# Patient Record
Sex: FEMALE | Race: OTHER | Employment: PART TIME | URBAN - METROPOLITAN AREA
[De-identification: names, ages, dates, MRNs, and addresses within clinical notes are randomized per-mention and may not be internally consistent; named-entity substitution may affect disease eponyms.]

---

## 2017-03-15 ENCOUNTER — HOSPITAL ENCOUNTER (OUTPATIENT)
Dept: LAB | Age: 37
Discharge: HOME | End: 2017-03-15

## 2017-03-15 PROCEDURE — 88142 CYTOPATH C/V THIN LAYER: CPT | Performed by: OBSTETRICS & GYNECOLOGY

## 2017-03-15 PROCEDURE — 87624 HPV HI-RISK TYP POOLED RSLT: CPT | Performed by: OBSTETRICS & GYNECOLOGY

## 2017-03-15 PROCEDURE — 87491 CHLMYD TRACH DNA AMP PROBE: CPT

## 2017-03-21 LAB
C TRACH RRNA SPEC QL NAA+PROBE: NEGATIVE
N GONORRHOEA RRNA SPEC QL NAA+PROBE: NEGATIVE
SPECIMEN SOURCE: NORMAL

## 2017-03-22 LAB
HPV I/H RISK 1 DNA CVX QL PROBE+SIG AMP: NEGATIVE
SPECIMEN SOURCE: NORMAL

## 2017-11-05 ENCOUNTER — APPOINTMENT (OUTPATIENT)
Dept: CT IMAGING | Age: 37
End: 2017-11-05
Attending: EMERGENCY MEDICINE
Payer: SELF-PAY

## 2017-11-05 ENCOUNTER — HOSPITAL ENCOUNTER (EMERGENCY)
Age: 37
Discharge: HOME | End: 2017-11-06
Attending: EMERGENCY MEDICINE
Payer: SELF-PAY

## 2017-11-05 DIAGNOSIS — R50.9 FEVER, UNSPECIFIED FEVER CAUSE: ICD-10-CM

## 2017-11-05 DIAGNOSIS — R10.9 FLANK PAIN: Primary | ICD-10-CM

## 2017-11-05 LAB
ALBUMIN SERPL-MCNC: 3.5 G/DL (ref 3.5–5)
ALBUMIN/GLOB SERPL: 0.9 {RATIO} (ref 1.2–3.5)
ALP SERPL-CCNC: 80 U/L (ref 50–136)
ALT SERPL-CCNC: 66 U/L (ref 12–65)
ANION GAP SERPL CALC-SCNC: 10 MMOL/L (ref 7–16)
AST SERPL-CCNC: 45 U/L (ref 15–37)
BASOPHILS # BLD: 0 K/UL (ref 0–0.2)
BASOPHILS NFR BLD: 0 % (ref 0–2)
BILIRUB SERPL-MCNC: 0.5 MG/DL (ref 0.2–1.1)
BUN SERPL-MCNC: 12 MG/DL (ref 6–23)
CALCIUM SERPL-MCNC: 8.7 MG/DL (ref 8.3–10.4)
CHLORIDE SERPL-SCNC: 101 MMOL/L (ref 98–107)
CO2 SERPL-SCNC: 28 MMOL/L (ref 21–32)
CREAT SERPL-MCNC: 0.67 MG/DL (ref 0.6–1)
DIFFERENTIAL METHOD BLD: ABNORMAL
EOSINOPHIL # BLD: 0 K/UL (ref 0–0.8)
EOSINOPHIL NFR BLD: 0 % (ref 0.5–7.8)
ERYTHROCYTE [DISTWIDTH] IN BLOOD BY AUTOMATED COUNT: 12.5 % (ref 11.9–14.6)
GLOBULIN SER CALC-MCNC: 4.1 G/DL (ref 2.3–3.5)
GLUCOSE SERPL-MCNC: 164 MG/DL (ref 65–100)
HCG UR QL: NEGATIVE
HCT VFR BLD AUTO: 41.1 % (ref 35.8–46.3)
HGB BLD-MCNC: 14.1 G/DL (ref 11.7–15.4)
IMM GRANULOCYTES # BLD: 0 K/UL (ref 0–0.5)
IMM GRANULOCYTES NFR BLD: 0 % (ref 0–5)
LACTATE BLD-SCNC: 2.4 MMOL/L (ref 0.5–1.9)
LIPASE SERPL-CCNC: 152 U/L (ref 73–393)
LYMPHOCYTES # BLD: 0.6 K/UL (ref 0.5–4.6)
LYMPHOCYTES NFR BLD: 8 % (ref 13–44)
MCH RBC QN AUTO: 30.2 PG (ref 26.1–32.9)
MCHC RBC AUTO-ENTMCNC: 34.3 G/DL (ref 31.4–35)
MCV RBC AUTO: 88 FL (ref 79.6–97.8)
MONOCYTES # BLD: 0.2 K/UL (ref 0.1–1.3)
MONOCYTES NFR BLD: 2 % (ref 4–12)
NEUTS SEG # BLD: 6.9 K/UL (ref 1.7–8.2)
NEUTS SEG NFR BLD: 90 % (ref 43–78)
PLATELET # BLD AUTO: 206 K/UL (ref 150–450)
PMV BLD AUTO: 9.3 FL (ref 10.8–14.1)
POTASSIUM SERPL-SCNC: 3.6 MMOL/L (ref 3.5–5.1)
PROT SERPL-MCNC: 7.6 G/DL (ref 6.3–8.2)
RBC # BLD AUTO: 4.67 M/UL (ref 4.05–5.25)
SODIUM SERPL-SCNC: 139 MMOL/L (ref 136–145)
WBC # BLD AUTO: 7.8 K/UL (ref 4.3–11.1)

## 2017-11-05 PROCEDURE — 74011250636 HC RX REV CODE- 250/636: Performed by: EMERGENCY MEDICINE

## 2017-11-05 PROCEDURE — 87205 SMEAR GRAM STAIN: CPT | Performed by: EMERGENCY MEDICINE

## 2017-11-05 PROCEDURE — 74176 CT ABD & PELVIS W/O CONTRAST: CPT

## 2017-11-05 PROCEDURE — 85025 COMPLETE CBC W/AUTO DIFF WBC: CPT | Performed by: EMERGENCY MEDICINE

## 2017-11-05 PROCEDURE — 96374 THER/PROPH/DIAG INJ IV PUSH: CPT | Performed by: EMERGENCY MEDICINE

## 2017-11-05 PROCEDURE — 99284 EMERGENCY DEPT VISIT MOD MDM: CPT | Performed by: EMERGENCY MEDICINE

## 2017-11-05 PROCEDURE — 83605 ASSAY OF LACTIC ACID: CPT

## 2017-11-05 PROCEDURE — 93005 ELECTROCARDIOGRAM TRACING: CPT | Performed by: EMERGENCY MEDICINE

## 2017-11-05 PROCEDURE — 80053 COMPREHEN METABOLIC PANEL: CPT | Performed by: EMERGENCY MEDICINE

## 2017-11-05 PROCEDURE — 81025 URINE PREGNANCY TEST: CPT

## 2017-11-05 PROCEDURE — 96361 HYDRATE IV INFUSION ADD-ON: CPT | Performed by: EMERGENCY MEDICINE

## 2017-11-05 PROCEDURE — 87040 BLOOD CULTURE FOR BACTERIA: CPT | Performed by: EMERGENCY MEDICINE

## 2017-11-05 PROCEDURE — 83690 ASSAY OF LIPASE: CPT | Performed by: EMERGENCY MEDICINE

## 2017-11-05 PROCEDURE — 87077 CULTURE AEROBIC IDENTIFY: CPT | Performed by: EMERGENCY MEDICINE

## 2017-11-05 PROCEDURE — 87186 SC STD MICRODIL/AGAR DIL: CPT | Performed by: EMERGENCY MEDICINE

## 2017-11-05 PROCEDURE — 96375 TX/PRO/DX INJ NEW DRUG ADDON: CPT | Performed by: EMERGENCY MEDICINE

## 2017-11-05 RX ORDER — ONDANSETRON 2 MG/ML
4 INJECTION INTRAMUSCULAR; INTRAVENOUS
Status: COMPLETED | OUTPATIENT
Start: 2017-11-05 | End: 2017-11-05

## 2017-11-05 RX ORDER — MORPHINE SULFATE 8 MG/ML
4 INJECTION, SOLUTION INTRAMUSCULAR; INTRAVENOUS
Status: COMPLETED | OUTPATIENT
Start: 2017-11-05 | End: 2017-11-05

## 2017-11-05 RX ORDER — KETOROLAC TROMETHAMINE 30 MG/ML
30 INJECTION, SOLUTION INTRAMUSCULAR; INTRAVENOUS
Status: COMPLETED | OUTPATIENT
Start: 2017-11-05 | End: 2017-11-06

## 2017-11-05 RX ORDER — SODIUM CHLORIDE 0.9 % (FLUSH) 0.9 %
5-10 SYRINGE (ML) INJECTION EVERY 8 HOURS
Status: DISCONTINUED | OUTPATIENT
Start: 2017-11-05 | End: 2017-11-06 | Stop reason: HOSPADM

## 2017-11-05 RX ORDER — SODIUM CHLORIDE 0.9 % (FLUSH) 0.9 %
5-10 SYRINGE (ML) INJECTION AS NEEDED
Status: DISCONTINUED | OUTPATIENT
Start: 2017-11-05 | End: 2017-11-06 | Stop reason: HOSPADM

## 2017-11-05 RX ADMIN — SODIUM CHLORIDE 1000 ML: 900 INJECTION, SOLUTION INTRAVENOUS at 23:20

## 2017-11-05 RX ADMIN — ONDANSETRON 4 MG: 2 INJECTION INTRAMUSCULAR; INTRAVENOUS at 23:20

## 2017-11-05 RX ADMIN — Medication 4 MG: at 23:20

## 2017-11-06 VITALS
HEIGHT: 67 IN | OXYGEN SATURATION: 94 % | WEIGHT: 215 LBS | DIASTOLIC BLOOD PRESSURE: 51 MMHG | RESPIRATION RATE: 18 BRPM | TEMPERATURE: 98.5 F | BODY MASS INDEX: 33.74 KG/M2 | HEART RATE: 119 BPM | SYSTOLIC BLOOD PRESSURE: 94 MMHG

## 2017-11-06 LAB
ATRIAL RATE: 125 BPM
BACTERIA URNS QL MICRO: 0 /HPF
CALCULATED P AXIS, ECG09: 90 DEGREES
CALCULATED R AXIS, ECG10: 58 DEGREES
CALCULATED T AXIS, ECG11: -66 DEGREES
CASTS URNS QL MICRO: 0 /LPF
DIAGNOSIS, 93000: NORMAL
EPI CELLS #/AREA URNS HPF: NORMAL /HPF
FLUAV AG NPH QL IA: NEGATIVE
FLUBV AG NPH QL IA: NEGATIVE
P-R INTERVAL, ECG05: 160 MS
Q-T INTERVAL, ECG07: 284 MS
QRS DURATION, ECG06: 82 MS
QTC CALCULATION (BEZET), ECG08: 409 MS
RBC #/AREA URNS HPF: 0 /HPF
VENTRICULAR RATE, ECG03: 125 BPM
WBC URNS QL MICRO: NORMAL /HPF

## 2017-11-06 PROCEDURE — 74011250636 HC RX REV CODE- 250/636: Performed by: EMERGENCY MEDICINE

## 2017-11-06 PROCEDURE — 81015 MICROSCOPIC EXAM OF URINE: CPT | Performed by: EMERGENCY MEDICINE

## 2017-11-06 PROCEDURE — 74011250637 HC RX REV CODE- 250/637: Performed by: EMERGENCY MEDICINE

## 2017-11-06 PROCEDURE — 87804 INFLUENZA ASSAY W/OPTIC: CPT | Performed by: EMERGENCY MEDICINE

## 2017-11-06 RX ORDER — DICLOFENAC SODIUM 75 MG/1
75 TABLET, DELAYED RELEASE ORAL 2 TIMES DAILY
Qty: 10 TAB | Refills: 0 | Status: SHIPPED | OUTPATIENT
Start: 2017-11-06 | End: 2020-01-10 | Stop reason: CLARIF

## 2017-11-06 RX ORDER — ACETAMINOPHEN 325 MG/1
650 TABLET ORAL
Status: COMPLETED | OUTPATIENT
Start: 2017-11-06 | End: 2017-11-06

## 2017-11-06 RX ADMIN — ACETAMINOPHEN 650 MG: 325 TABLET ORAL at 00:55

## 2017-11-06 RX ADMIN — KETOROLAC TROMETHAMINE 30 MG: 30 INJECTION, SOLUTION INTRAMUSCULAR at 00:25

## 2017-11-06 RX ADMIN — SODIUM CHLORIDE 1000 ML: 900 INJECTION, SOLUTION INTRAVENOUS at 00:55

## 2017-11-06 NOTE — ED TRIAGE NOTES
PT arrived to ED c/o  Upper left quadrant abdomen pain. PT states the pain radiates to her back. PT states she has burning when she urinates. PT states she has vomited.

## 2017-11-06 NOTE — ED PROVIDER NOTES
HPI Comments: 39-year-old  female presents with abdominal pain which began around 9:30 this evening. Pain is associated with nausea and vomiting. She has also noted increased urination and discomfort since the pain began. Pain is described as a sharp stabbing discomfort in her left upper quadrant and radiating into her back. History obtained through the     Patient is a 39 y.o. female presenting with abdominal pain. The history is provided by the patient. A  was used. Abdominal Pain    Associated symptoms include nausea, vomiting, dysuria, frequency and back pain. Pertinent negatives include no fever, no headaches and no chest pain. No past medical history on file. Past Surgical History:   Procedure Laterality Date    HX DILATION AND CURETTAGE           No family history on file. Social History     Social History    Marital status:      Spouse name: N/A    Number of children: N/A    Years of education: N/A     Occupational History    Not on file. Social History Main Topics    Smoking status: Never Smoker    Smokeless tobacco: Not on file    Alcohol use Not on file    Drug use: Not on file    Sexual activity: Not on file     Other Topics Concern    Not on file     Social History Narrative    No narrative on file         ALLERGIES: Review of patient's allergies indicates no known allergies. Review of Systems   Constitutional: Negative for fever. HENT: Negative for congestion. Respiratory: Negative for cough and shortness of breath. Cardiovascular: Negative for chest pain. Gastrointestinal: Positive for abdominal pain, nausea and vomiting. Genitourinary: Positive for dysuria and frequency. Musculoskeletal: Positive for back pain. Negative for neck pain. Skin: Negative for rash. Neurological: Negative for headaches.        Vitals:    11/05/17 2241   BP: 123/71   Pulse: (!) 139   Resp: 20   Temp: (!) 101.4 °F (38.6 °C) SpO2: 96%   Weight: 97.5 kg (215 lb)   Height: 5' 7\" (1.702 m)            Physical Exam   Constitutional: She is oriented to person, place, and time. She appears well-developed and well-nourished. She appears distressed. Appears uncomfortable   HENT:   Head: Normocephalic and atraumatic. Mouth/Throat: Oropharynx is clear and moist.   Eyes: Conjunctivae are normal. Pupils are equal, round, and reactive to light. No scleral icterus. Neck: Normal range of motion. Neck supple. Cardiovascular: Regular rhythm. Tachycardia present. Pulmonary/Chest: Effort normal and breath sounds normal. She has no wheezes. Abdominal: Soft. There is tenderness in the left upper quadrant. There is CVA tenderness. There is no rigidity, no rebound and no guarding. Musculoskeletal: Normal range of motion. She exhibits no edema. Neurological: She is alert and oriented to person, place, and time. Skin: Skin is warm and dry. Psychiatric: She has a normal mood and affect. Vitals reviewed. MDM  Number of Diagnoses or Management Options  Diagnosis management comments: CBC and comprehensive panel are unremarkable. Urinalysis shows no infection. Pregnancy is negative. Lactic acid 2.4. Patient treated with 2 L normal saline, 4 mg morphine and 4 mg Zofran, 30 mg Toradol and Tylenol. CT scan shows no acute abnormality. Etiology for her symptoms is not clear at this time that she has a nonsurgical abdominal exam.  No findings to support bacterial infection. Heart rate has improved with hydration and fever control. At this time she appears safe for discharge home with primary care follow-up. We'll treat her pain with Voltaren.        Amount and/or Complexity of Data Reviewed  Clinical lab tests: ordered and reviewed  Tests in the radiology section of CPT®: reviewed and ordered  Tests in the medicine section of CPT®: ordered and reviewed  Independent visualization of images, tracings, or specimens: yes    Risk of Complications, Morbidity, and/or Mortality  Presenting problems: moderate  Diagnostic procedures: moderate  Management options: moderate      ED Course       Procedures

## 2017-11-06 NOTE — PROGRESS NOTES
Called patient with results. Patient state that she is feeling better. Encourage patient to return to the ED is symptoms are not getting better. Thank you for this referral,      Aminah Skaggs, [facility]  /Patient [facility].  [address] [place] [address]    [phone]

## 2017-11-06 NOTE — DISCHARGE INSTRUCTIONS
Aprenda sobre la fiebre - [ Learning About Fever ]  ¿Qué es la fiebre? La fiebre es daniel temperatura corporal britt. Es daniel de las [employer] que el cuerpo combate enfermedades. La fiebre indica que el cuerpo está reaccionando a daniel infección o a otras enfermedades, tanto leves yolanda graves. La fiebre es un síntoma, no daniel enfermedad en sí misma. La fiebre puede ser daniel señal de que usted está enfermo, donald la mayoría de las fiebres no están causadas por un problema grave. Es posible que usted tenga fiebre con daniel enfermedad de alla importancia, yolanda un resfriado. Donald, en ocasiones, daniel infección muy grave puede causar poca o nada de fiebre. Es importante considerar otros síntomas, otras afecciones que usted tenga y cómo se siente usted en general. En niños, preste atención a perry comportamiento y a los síntomas de los que se [place]. ¿Cuál es la temperatura normal del cuerpo? Daniel temperatura corporal normal es de 98.6°F (37°C), aproximadamente. Algunas personas tienen daniel temperatura normal que es un poco más britt o algo más baja que esta. [place] temperatura puede ser un poco más baja en la mañana que más tarde en el día. Podría elevarse cuando hace ejercicio, lleva ropa gruesa, seng un baño caliente o cuando hace calor. [place] temperatura también puede ser diferente dependiendo de cómo la mida. Si mide la temperatura en la boca (oral) o en la axila, puede ser algo más baja que la temperatura central (rectal). ¿Qué es daniel temperatura de fiebre? Daniel temperatura central de 100.4°F (38°C) o superior se considera fiebre. ¿Qué puede causar la fiebre? La fiebre puede ser causada por:  · Infecciones. Esta es la causa más común de la [place]. Los ejemplos de infecciones que pueden provocar fiebre incluyen la gripe, daniel infección de los riñones o la neumonía. · Algunos medicamentos. · Traumatismos o lesiones graves, yolanda un ataque al corazón, un ataque cerebral, insolación o quemaduras.   · Otras afecciones médicas, yolanda artritis y algunos tipos de cáncer. ¿Cómo puede tratar la fiebre en el hogar? · Pregúntele a perry médico si puede lawanda un analgésico (medicamento para el dolor) de venta kole, yolanda acetaminofén (Tylenol), ibuprofeno (Advil, Motrin) o naproxeno (Aleve). Sea ilia con los medicamentos. Adriana y siga todas las instrucciones de la [place]. · Keila abundantes líquidos para prevenir la deshidratación. Opte por beber agua y otros líquidos jennifer sin cafeína hasta que se sienta mejor. Si tiene daniel enfermedad renal, cardíaca o hepática y tiene que restringir los líquidos, hable con perry médico antes de aumentar la cantidad de líquido que maged. La atención de seguimiento es daniel parte clave de perry tratamiento y seguridad. Asegúrese de hacer y acudir a todas las citas, y llame a perry médico si está teniendo problemas. También es daniel buena idea saber los resultados de los exámenes y mantener daniel lista de los medicamentos que seng. ¿Dónde puede encontrar más información en inglés? Cyndi Hartley a http://vitaly-kraig.info/. Maycol Novoa K772 en la búsqueda para aprender más acerca de \"Aprenda sobre la [place] - [ Learning About Fever ]. \"  Revisado: 20 Ronit Fregoso 2017  Versión del contenido: 11.4  © 0206-4904 Healthwise, Incorporated. Las instrucciones de cuidado fueron adaptadas bajo licencia por [employer] (which disclaims liability or warranty for this information). Si usted tiene [place] afección médica o sobre estas instrucciones, siempre pregunte a perry profesional de allegra. Healthwise, Incorporated niega toda garantía o responsabilidad por perry uso de esta información. Dolor en el flanco: Instrucciones de cuidado - [ Flank Pain: Care Instructions ]  Instrucciones de cuidado  El dolor en el flanco (lumbar) es un dolor en el lado de la espalda bharati debajo de la caja torácica y por encima de la cintura. Puede ser en jules o ambos lados.  El dolor lumbar tiene muchas causas posibles, yolanda un cálculo renal, daniel infección de las vías urinarias o tensión en la espalda. El dolor lumbar puede mejorar por sí solo. Donald no ignore los nuevos síntomas, yolnada [place], náuseas y vómito, problemas urinarios, dolor que [place] y [place]. Pueden ser señales de un problema más grave. Puede que deba hacerse pruebas u otro tratamiento. La atención de seguimiento es daniel parte clave de antonio tratamiento y seguridad. Asegúrese de hacer y acudir a todas las citas, y llame a antonio médico si está teniendo problemas. También es daniel buena idea saber los resultados de los exámenes y mantener daniel lista de los medicamentos que seng. ¿Cómo puede cuidarse en el hogar? · Descanse hasta que se sienta mejor. · [employer] analgésicos exactamente yolanda le fueron indicados. ¨ Si el médico le tomasa un analgésico recetado, tómelo de la forma prescrita. ¨ Si no está tomando un analgésico recetado, pregúntele a antonoi médico si puede lawanda jules de venta kole, yolanda acetaminofén (Tylenol), ibuprofeno (Advil, Motrin) o naproxeno (Aleve). Adriana y siga todas las instrucciones de la [place]. · Si antonio médico le recetó antibióticos, tómelos según las indicaciones. No deje de tomarlos sólo porque se sienta mejor. Debe lawanda todos los antibióticos hasta terminarlos. · Para aplicar calor, coloque daniel bolsa de agua tibia, daniel almohadilla térmica a baja temperatura o un paño tibio sobre la leonardo adolorida. No se vaya a dormir con daniel almohadilla térmica sobre la piel. · Para prevenir la deshidratación, vu abundantes líquidos, lo suficiente para que antonio orina sea de color amarillo jacek o transparente yolanda el agua. Elija agua y otros líquidos jennifer sin cafeína hasta que se sienta mejor. Si tiene enfermedad de los riñones, el corazón o el hígado y tiene que [place]'s líquidos, hable con antonio médico antes de aumentar la cantidad de líquidos que maged. ¿Cuándo debe pedir ayuda?   Llame a antonio médico ahora mismo o busque atención médica inmediata si:  ? · Antonio dolor lumbar empeora. ? · Tiene síntomas nuevos yolanda fiebre, náuseas o vómito. ? · Tiene síntomas de un problema urinario. Por ejemplo:  ¨ Tiene tonya o pus en la orina. ¨ Tiene escalofríos o le duele el cuerpo. ¨ Siente dolor al [place]. ¨ Tiene dolor en la kiya o el abdomen. ? Vigile muy de cerca los cambios en perry allegra, y asegúrese de comunicarse con perry médico si no mejora yolanda se esperaba. ¿Dónde puede encontrar más información en inglés? Raine Chavarria a http://vitaly-kraig.info/. Jazmín Pelletier M031 en la búsqueda para aprender más acerca de \"Dolor en el flanco: Instrucciones de cuidado - [ Flank Pain: Care Instructions ]. \"  Revisado: 20 Laurie Wilson 2017  Versión del contenido: 11.4  © 5655-9923 Healthwise, Incorporated. Las instrucciones de cuidado fueron adaptadas bajo licencia por [employer] (which disclaims liability or warranty for this information). Si usted tiene [place] afección médica o sobre estas instrucciones, siempre pregunte a perry profesional de allegra. Healthwise, Incorporated niega toda garantía o responsabilidad por perry uso de esta información.

## 2017-11-06 NOTE — ED NOTES
I have reviewed discharge instructions with the patient. The patient verbalized understanding. Patient left ED via Discharge Method: ambulatory to Home with boyfriend). Opportunity for questions and clarification provided. Patient given 1 scripts.

## 2017-11-07 ENCOUNTER — HOSPITAL ENCOUNTER (EMERGENCY)
Age: 37
Discharge: HOME | End: 2017-11-08
Payer: SELF-PAY

## 2017-11-07 DIAGNOSIS — N39.0 URINARY TRACT INFECTION WITHOUT HEMATURIA, SITE UNSPECIFIED: Primary | ICD-10-CM

## 2017-11-07 DIAGNOSIS — R78.81 BACTEREMIA DUE TO GRAM-NEGATIVE BACTERIA: ICD-10-CM

## 2017-11-07 LAB
ALBUMIN SERPL-MCNC: 3.4 G/DL (ref 3.5–5)
ALBUMIN/GLOB SERPL: 0.8 {RATIO} (ref 1.2–3.5)
ALP SERPL-CCNC: 72 U/L (ref 50–136)
ALT SERPL-CCNC: 61 U/L (ref 12–65)
ANION GAP SERPL CALC-SCNC: 12 MMOL/L (ref 7–16)
AST SERPL-CCNC: 26 U/L (ref 15–37)
BASOPHILS # BLD: 0 K/UL (ref 0–0.2)
BASOPHILS NFR BLD: 0 % (ref 0–2)
BILIRUB SERPL-MCNC: 0.2 MG/DL (ref 0.2–1.1)
BUN SERPL-MCNC: 8 MG/DL (ref 6–23)
CALCIUM SERPL-MCNC: 9.2 MG/DL (ref 8.3–10.4)
CHLORIDE SERPL-SCNC: 102 MMOL/L (ref 98–107)
CO2 SERPL-SCNC: 26 MMOL/L (ref 21–32)
CREAT SERPL-MCNC: 0.73 MG/DL (ref 0.6–1)
DIFFERENTIAL METHOD BLD: ABNORMAL
EOSINOPHIL # BLD: 0 K/UL (ref 0–0.8)
EOSINOPHIL NFR BLD: 1 % (ref 0.5–7.8)
ERYTHROCYTE [DISTWIDTH] IN BLOOD BY AUTOMATED COUNT: 12.6 % (ref 11.9–14.6)
GLOBULIN SER CALC-MCNC: 4.2 G/DL (ref 2.3–3.5)
GLUCOSE SERPL-MCNC: 199 MG/DL (ref 65–100)
HCT VFR BLD AUTO: 40.9 % (ref 35.8–46.3)
HGB BLD-MCNC: 13.8 G/DL (ref 11.7–15.4)
IMM GRANULOCYTES # BLD: 0 K/UL (ref 0–0.5)
IMM GRANULOCYTES NFR BLD: 0 % (ref 0–5)
LACTATE BLD-SCNC: 2 MMOL/L (ref 0.5–1.9)
LYMPHOCYTES # BLD: 2.5 K/UL (ref 0.5–4.6)
LYMPHOCYTES NFR BLD: 35 % (ref 13–44)
MCH RBC QN AUTO: 30.1 PG (ref 26.1–32.9)
MCHC RBC AUTO-ENTMCNC: 33.7 G/DL (ref 31.4–35)
MCV RBC AUTO: 89.1 FL (ref 79.6–97.8)
MONOCYTES # BLD: 0.6 K/UL (ref 0.1–1.3)
MONOCYTES NFR BLD: 8 % (ref 4–12)
NEUTS SEG # BLD: 4.1 K/UL (ref 1.7–8.2)
NEUTS SEG NFR BLD: 56 % (ref 43–78)
PLATELET # BLD AUTO: 216 K/UL (ref 150–450)
PMV BLD AUTO: 9.3 FL (ref 10.8–14.1)
POTASSIUM SERPL-SCNC: 3.6 MMOL/L (ref 3.5–5.1)
PROCALCITONIN SERPL-MCNC: 0.8 NG/ML
PROT SERPL-MCNC: 7.6 G/DL (ref 6.3–8.2)
RBC # BLD AUTO: 4.59 M/UL (ref 4.05–5.25)
SODIUM SERPL-SCNC: 140 MMOL/L (ref 136–145)
WBC # BLD AUTO: 7.2 K/UL (ref 4.3–11.1)

## 2017-11-07 PROCEDURE — 99284 EMERGENCY DEPT VISIT MOD MDM: CPT

## 2017-11-07 PROCEDURE — 84145 PROCALCITONIN (PCT): CPT | Performed by: EMERGENCY MEDICINE

## 2017-11-07 PROCEDURE — 87040 BLOOD CULTURE FOR BACTERIA: CPT | Performed by: EMERGENCY MEDICINE

## 2017-11-07 PROCEDURE — 80053 COMPREHEN METABOLIC PANEL: CPT | Performed by: EMERGENCY MEDICINE

## 2017-11-07 PROCEDURE — 96365 THER/PROPH/DIAG IV INF INIT: CPT

## 2017-11-07 PROCEDURE — 96361 HYDRATE IV INFUSION ADD-ON: CPT

## 2017-11-07 PROCEDURE — 81003 URINALYSIS AUTO W/O SCOPE: CPT

## 2017-11-07 PROCEDURE — 83605 ASSAY OF LACTIC ACID: CPT

## 2017-11-07 PROCEDURE — 85025 COMPLETE CBC W/AUTO DIFF WBC: CPT | Performed by: EMERGENCY MEDICINE

## 2017-11-08 VITALS
BODY MASS INDEX: 33.43 KG/M2 | OXYGEN SATURATION: 95 % | WEIGHT: 208 LBS | SYSTOLIC BLOOD PRESSURE: 130 MMHG | HEIGHT: 66 IN | HEART RATE: 96 BPM | RESPIRATION RATE: 17 BRPM | DIASTOLIC BLOOD PRESSURE: 69 MMHG | TEMPERATURE: 98.4 F

## 2017-11-08 LAB
BACTERIA SPEC CULT: ABNORMAL
BACTERIA URNS QL MICRO: ABNORMAL /HPF
CASTS URNS QL MICRO: ABNORMAL /LPF
EPI CELLS #/AREA URNS HPF: ABNORMAL /HPF
GRAM STN SPEC: ABNORMAL
HCG UR QL: NEGATIVE
RBC #/AREA URNS HPF: ABNORMAL /HPF
SERVICE CMNT-IMP: ABNORMAL
SERVICE CMNT-IMP: ABNORMAL
WBC URNS QL MICRO: ABNORMAL /HPF

## 2017-11-08 PROCEDURE — 81025 URINE PREGNANCY TEST: CPT

## 2017-11-08 PROCEDURE — 87040 BLOOD CULTURE FOR BACTERIA: CPT | Performed by: EMERGENCY MEDICINE

## 2017-11-08 PROCEDURE — 74011250636 HC RX REV CODE- 250/636

## 2017-11-08 PROCEDURE — 81015 MICROSCOPIC EXAM OF URINE: CPT

## 2017-11-08 PROCEDURE — 74011000258 HC RX REV CODE- 258

## 2017-11-08 RX ORDER — SULFAMETHOXAZOLE AND TRIMETHOPRIM 800; 160 MG/1; MG/1
1 TABLET ORAL 2 TIMES DAILY
Qty: 14 TAB | Refills: 0 | Status: SHIPPED | OUTPATIENT
Start: 2017-11-08 | End: 2017-11-15

## 2017-11-08 RX ADMIN — CEFTRIAXONE SODIUM 1 G: 1 INJECTION, POWDER, FOR SOLUTION INTRAMUSCULAR; INTRAVENOUS at 02:42

## 2017-11-08 RX ADMIN — SODIUM CHLORIDE 1000 ML: 900 INJECTION, SOLUTION INTRAVENOUS at 02:41

## 2017-11-08 NOTE — ED NOTES
I have reviewed discharge instructions with the patient. The patient verbalized understanding. Patient left ED via Discharge Method: ambulatory to Home with . Opportunity for questions and clarification provided. Patient given {NUMBERS [id number]} scripts.

## 2017-11-08 NOTE — ED PROVIDER NOTES
HPI Comments: 65-year-old female with positive blood cultures called by CT did come back for reexamination. Patient had a workup which included UA lab work with cultures and a CT abdomen and pelvis. Everything was negative except the blood cultures came back positive for gram-negative rods. The patient was called to the ER. Patient's not had a fever. She continues to have left upper quadrant and right flank pain. Again this was evaluated by CT 2 days ago was negative. Patient is a 39 y.o. female presenting with abnormal lab results. The history is provided by the patient. Abnormal Lab Results   This is a recurrent problem. The current episode started more than 1 week ago. The problem occurs constantly. The problem has not changed since onset. Associated symptoms include abdominal pain. Pertinent negatives include no chest pain. Nothing aggravates the symptoms. Nothing relieves the symptoms. She has tried nothing for the symptoms. History reviewed. No pertinent past medical history. Past Surgical History:   Procedure Laterality Date    HX DILATION AND CURETTAGE           History reviewed. No pertinent family history. Social History     Social History    Marital status:      Spouse name: N/A    Number of children: N/A    Years of education: N/A     Occupational History    Not on file. Social History Main Topics    Smoking status: Never Smoker    Smokeless tobacco: Not on file    Alcohol use Not on file    Drug use: Not on file    Sexual activity: Not on file     Other Topics Concern    Not on file     Social History Narrative         ALLERGIES: Review of patient's allergies indicates no known allergies. Review of Systems   Constitutional: Negative. Negative for activity change. HENT: Negative. Eyes: Negative. Respiratory: Negative. Cardiovascular: Negative. Negative for chest pain. Gastrointestinal: Positive for abdominal pain. Genitourinary: Negative. Musculoskeletal: Negative. Skin: Negative. Neurological: Negative. Psychiatric/Behavioral: Negative. All other systems reviewed and are negative. Vitals:    11/07/17 2303   BP: 120/76   Pulse: 98   Resp: 17   Temp: 98.4 °F (36.9 °C)   SpO2: 98%   Weight: 94.3 kg (208 lb)   Height: 5' 6\" (1.676 m)            Physical Exam     MDM  Number of Diagnoses or Management Options  Bacteremia due to Gram-negative bacteria: established and improving  Urinary tract infection without hematuria, site unspecified: established and improving  Diagnosis management comments: Patient is afebrile vital signs are stable patient taking oral fluids without difficulty. Source of infection as her urine. We'll give her IV antibiotics here and have her follow-up closely within 48 hours returning to the ER for recheck if necessary.        Amount and/or Complexity of Data Reviewed  Clinical lab tests: ordered and reviewed  Tests in the medicine section of CPT®: ordered and reviewed      ED Course       Procedures

## 2017-11-08 NOTE — DISCHARGE INSTRUCTIONS
Infección urinaria en las mujeres: Instrucciones de cuidado - [ Urinary Tract Infection in Women: Care Instructions ]  Instrucciones de cuidado    Daniel infección urinaria (UTI, por zeinab siglas en inglés) es un término general que hace referencia a daniel infección que se produce en cualquier parte entre los riñones y la uretra (conducto por el cual se expulsa la orina). La mayoría de las UTI son infecciones de la vejiga. Con frecuencia, causan dolor o ardor al [place]. Las UTI son causadas por bacterias y pueden curarse con antibióticos. Asegúrese de completar el tratamiento para que la infección desaparezca. La atención de seguimiento es daniel parte clave de perry tratamiento y seguridad. Asegúrese de hacer y acudir a todas las citas, y llame a perry médico si está teniendo problemas. También es daniel buena idea saber los resultados de los exámenes y mantener daniel lista de los medicamentos que seng. ¿Cómo puede cuidarse en el hogar? · [address] No deje de tomarlos por el hecho de sentirse mejor. Debe lawanda todos los antibióticos hasta terminarlos. · Gage los próximos jules o [address], keila mayor cantidad de [employer] y otros líquidos. [place] puede ayudar a eliminar las bacterias que provocan la infección. (Si tiene daniel enfermedad de los riñones, el corazón o el hígado y tiene que Monique's líquidos, hable con perry médico antes de aumentar perry consumo). · Evite las bebidas gaseosas o con cafeína. Pueden irritar la vejiga. · Orine con frecuencia. Trate de vaciar la vejiga cada vez que orine. · Para aliviar el dolor, tome un baño caliente o colóquese daniel almohadilla térmica a baja temperatura sobre la parte baja del abdomen o la leonardo genital. Nunca se duerma mientras [place] daniel almohadilla térmica. Para prevenir las infecciones urinarias  · Keila abundante agua todos los días. [place] la ayuda a orinar con frecuencia, lo que elimina las bacterias de perry organismo.  (Si tiene [employer] riñones, el corazón o el hígado y tiene que Monique's líquidos, hable con perry médico antes de aumentar perry consumo). · Orine cuando necesite hacerlo. · Orine inmediatamente después de ele tenido [employer]. · Cámbiese las toallas sanitarias con frecuencia. · Evite el uso de lavados vaginales, los negro de burbujas, los [place] de higiene femenina y otros productos para la higiene femenina que contengan desodorantes. · Después de ir al baño, límpiese de adelante hacia atrás. ¿Cuándo debes pedir ayuda? Llama a tu médico ahora mismo o busca atención médica inmediata si:  ? · Aparecen síntomas yolanda fiebre, escalofríos, náuseas o vómitos por Coralyn Minden, o empeoran. ? · Te empieza a doler la espalda, bharati debajo de la caja torácica. A esto se le llama dolor en el flanco.   ? · Aparece tonya o pus en la orina. ? · Tienes problemas con los antibióticos. ?Presta especial atención a los cambios en tu allegra y asegúrate de comunicarte con tu médico si:  ? · No mejoras después de eel tomado un antibiótico jignesh 2 días. ? · Los síntomas desaparecen y Vanita Fernandes. ¿Dónde puede encontrar más información en inglés? Shikha Warren a http://vitaly-kraig.info/. Renetta MCGRAW en la búsqueda para aprender más acerca de \"Infección urinaria en las mujeres: Instrucciones de cuidado - [ Urinary Tract Infection in Women: Care Instructions ]. \"  Revisado: 12 [place], 2017  Versión del contenido: 11.4  © 2585-1429 Healthwise, Incorporated. Las instrucciones de cuidado fueron adaptadas bajo licencia por [employer] (which disclaims liability or warranty for this information). Si usted tiene [place] afección médica o sobre estas instrucciones, siempre pregunte a perry profesional de allegra. [facility] niega toda garantía o responsabilidad por perry uso de esta información.

## 2017-11-08 NOTE — ED TRIAGE NOTES
Pt arrives stating that she was called by this hospital for an abnormal/positive lab result and that she needed to return to the ER. Pt states she does not know what the result was for. Pt states she was seen here for a kidney infection/kidney pain. States her belly has been more swollen recently. Pt alert and oriented in triage.

## 2017-11-08 NOTE — ED NOTES
I have reviewed discharge instructions with the patient. The patient verbalized understanding. Patient left ED via Discharge Method: ambulatory to Home with family    Opportunity for questions and clarification provided. Patient given 1 scripts.      Discharged using [facility] interpreter with paperwork in [facility]

## 2017-11-13 LAB
BACTERIA SPEC CULT: NORMAL
BACTERIA SPEC CULT: NORMAL
SERVICE CMNT-IMP: NORMAL
SERVICE CMNT-IMP: NORMAL

## 2018-02-27 ENCOUNTER — HOSPITAL ENCOUNTER (EMERGENCY)
Age: 38
Discharge: HOME | End: 2018-02-27
Attending: EMERGENCY MEDICINE
Payer: SELF-PAY

## 2018-02-27 VITALS
HEIGHT: 66 IN | OXYGEN SATURATION: 99 % | SYSTOLIC BLOOD PRESSURE: 137 MMHG | WEIGHT: 210 LBS | RESPIRATION RATE: 16 BRPM | HEART RATE: 88 BPM | DIASTOLIC BLOOD PRESSURE: 81 MMHG | TEMPERATURE: 98 F | BODY MASS INDEX: 33.75 KG/M2

## 2018-02-27 DIAGNOSIS — T14.8XXA SCRATCHES: Primary | ICD-10-CM
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
## 2018-02-27 PROCEDURE — 99284 EMERGENCY DEPT VISIT MOD MDM: CPT | Performed by: EMERGENCY MEDICINE

## 2018-02-27 PROCEDURE — 74011250636 HC RX REV CODE- 250/636: Performed by: EMERGENCY MEDICINE

## 2018-02-27 PROCEDURE — 90715 TDAP VACCINE 7 YRS/> IM: CPT | Performed by: EMERGENCY MEDICINE

## 2018-02-27 PROCEDURE — 74011250637 HC RX REV CODE- 250/637: Performed by: EMERGENCY MEDICINE

## 2018-02-27 PROCEDURE — 90471 IMMUNIZATION ADMIN: CPT | Performed by: EMERGENCY MEDICINE

## 2018-02-27 RX ORDER — NAPROXEN 250 MG/1
500 TABLET ORAL
Status: COMPLETED | OUTPATIENT
Start: 2018-02-27 | End: 2018-02-27

## 2018-02-27 RX ADMIN — TETANUS TOXOID, REDUCED DIPHTHERIA TOXOID AND ACELLULAR PERTUSSIS VACCINE, ADSORBED 0.5 ML: 5; 2.5; 8; 8; 2.5 SUSPENSION INTRAMUSCULAR at 15:34

## 2018-02-27 RX ADMIN — NAPROXEN 500 MG: 250 TABLET ORAL at 15:34

## 2018-02-27 NOTE — DISCHARGE INSTRUCTIONS
Keep wounds clean and dry. Motrin 600 mg every 6 hours for aches and pains         Raspones (excoriaciones): Instrucciones de cuidado - [ Shagufta Sood (Abrasions): Care Instructions ]  Instrucciones de cuidado  Los raspones (excoriaciones) son heridas en donde la piel ha sido frotada o arrancada. La mayoría de los raspones no penetran mucho en la piel, bita algunos pueden llegar a desprender varias capas de piel. Los raspones no suelen sangrar mucho, bita pueden supurar un líquido rosáceo. Los raspones en la layo o en la silvia pueden parecer peor de lo que son. Pueden sangrar mucho debido a la buena irrigación sanguínea de estas zonas. La mayoría de los raspones sanan alia y es posible que no requieran un vendaje. Suelen sanar dentro de 3 a 7 días. Un raspón lisa y profundo puede tardar de 1 a 2 semanas o más en sanar. En algunos raspones se puede formar Ernestina Shall. La atención de seguimiento es daniel parte clave de perry tratamiento y seguridad. Asegúrese de hacer y acudir a todas las citas, y llame a perry médico si está teniendo problemas. También es daniel buena idea saber los resultados de los exámenes y mantener daniel lista de los medicamentos que seng. ¿Cómo puede cuidarse en el hogar? · Si perry médico le dijo cómo cuidarse la herida, siga las instrucciones de perry médico. Si no le tomasa instrucciones, siga estos consejos generales:  ¨ Lávese el raspón con agua limpia 2 veces al día. No use peróxido de hidrógeno (agua [place]) ni alcohol, los cuales pueden retrasar la sanación. ¨ Puede cubrirse el raspón con daniel capa delgada de vaselina y Veronique Bulle venda no adherente. ¨ Aplíquese más vaselina y [facility] la venda según sea necesario. · Mantenga elevada la leonardo lesionada sobre daniel almohada en cualquier momento que se siente o se acueste jignesh los 3 días siguientes. Trate de mantener la leonardo por encima del nivel del corazón. [place] ayudará a reducir la hinchazón. · Sea ilia con los medicamentos.  [employer] (medicamentos para el dolor) exactamente según las indicaciones. ¨ Si el médico le recetó un analgésico, tómelo según las indicaciones. ¨ Si no está tomando un analgésico recetado, pregúntele a perry médico si puede lawanda jules de [place]. ¿Cuándo debe pedir ayuda? Llame a perry médico ahora mismo o busque atención médica inmediata si:  ? · Tiene señales de infección, tales yolanda:  ¨ Aumento del dolor, la hinchazón, el enrojecimiento o la temperatura alrededor del raspón. ¨ Vetas rojizas que salen del raspón. ¨ Pus que sale del raspón. Jamaal Hay. ? · El raspón comienza a sangrar, y la tonya empapa el vendaje. Es normal que salgan pequeñas cantidades de Reanna. ?Preste especial atención a los cambios en perry allegra y asegúrese de comunicarse con perry médico si el raspón no mejora día a día. ¿Dónde puede encontrar más información en inglés? Елена Ngo a http://vitaly-kraig.info/. Escriba A374 en la búsqueda para aprender más acerca de \"Raspones (excoriaciones): Instrucciones de cuidado - [ Kennedy Nice (Abrasions): Care Instructions ]. \"  Revisado: 20 [facility] 2017  Versión del contenido: 11.4  © 7143-1667 Healthwise, Incorporated. Las instrucciones de cuidado fueron adaptadas bajo licencia por [employer] (which disclaims liability or warranty for this information). Si usted tiene [place] afección médica o sobre estas instrucciones, siempre pregunte a perry profesional de allegra. Healthwise, Incorporated niega toda garantía o responsabilidad por perry uso de esta información.
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
## 2018-02-27 NOTE — PROGRESS NOTES
present at bedside during discharge with unit nurse.     [address] [facility]  [phone]

## 2018-02-27 NOTE — PROGRESS NOTES
present during triage/ assessment with Dr. Ann Guerra and visit with .     [address] [facility]  [phone]

## 2018-02-27 NOTE — ED NOTES
I have reviewed discharge instructions with the patient. The patient verbalized understanding. Patient left ED via Discharge Method: ambulatory to Home with self. Opportunity for questions and clarification provided. Patient given 0 scripts. To continue your aftercare when you leave the hospital, you may receive an automated call from our care team to check in on how you are doing. This is a free service and part of our promise to provide the best care and service to meet your aftercare needs.  If you have questions, or wish to unsubscribe from this service please call [phone]. Thank you for Choosing our [facility] Emergency Department.

## 2019-11-23 ENCOUNTER — HOSPITAL ENCOUNTER (INPATIENT)
Age: 39
LOS: 5 days | Discharge: HOME | DRG: 853 | End: 2019-11-28
Attending: EMERGENCY MEDICINE | Admitting: UROLOGY
Payer: SELF-PAY

## 2019-11-23 ENCOUNTER — APPOINTMENT (OUTPATIENT)
Dept: CT IMAGING | Age: 39
DRG: 853 | End: 2019-11-23
Attending: EMERGENCY MEDICINE
Payer: SELF-PAY

## 2019-11-23 ENCOUNTER — APPOINTMENT (OUTPATIENT)
Dept: GENERAL RADIOLOGY | Age: 39
DRG: 853 | End: 2019-11-23
Attending: UROLOGY
Payer: SELF-PAY

## 2019-11-23 DIAGNOSIS — N20.1 URETERAL STONE: ICD-10-CM

## 2019-11-23 DIAGNOSIS — N20.1 URETEROLITHIASIS: Primary | ICD-10-CM

## 2019-11-23 DIAGNOSIS — N17.9 AKI (ACUTE KIDNEY INJURY) (HCC): ICD-10-CM

## 2019-11-23 DIAGNOSIS — A41.9 SEPTIC SHOCK (HCC): ICD-10-CM

## 2019-11-23 DIAGNOSIS — R65.21 SEPTIC SHOCK (HCC): ICD-10-CM

## 2019-11-23 DIAGNOSIS — N13.1 HYDRONEPHROSIS DUE TO OBSTRUCTION OF URETER: ICD-10-CM

## 2019-11-23 DIAGNOSIS — R09.02 HYPOXIA: ICD-10-CM

## 2019-11-23 DIAGNOSIS — D69.6 THROMBOCYTOPENIA (HCC): ICD-10-CM

## 2019-11-23 DIAGNOSIS — E77.8 HYPOPROTEINEMIA (HCC): ICD-10-CM

## 2019-11-23 DIAGNOSIS — E11.9 TYPE 2 DIABETES MELLITUS WITHOUT COMPLICATION, UNSPECIFIED WHETHER LONG TERM INSULIN USE (HCC): ICD-10-CM

## 2019-11-23 DIAGNOSIS — E87.20 LACTIC ACIDOSIS: ICD-10-CM

## 2019-11-23 LAB
ALBUMIN SERPL-MCNC: 3 G/DL (ref 3.5–5)
ALBUMIN SERPL-MCNC: 3.7 G/DL (ref 3.5–5)
ALBUMIN/GLOB SERPL: 0.8 {RATIO} (ref 1.2–3.5)
ALBUMIN/GLOB SERPL: 0.8 {RATIO} (ref 1.2–3.5)
ALP SERPL-CCNC: 71 U/L (ref 50–136)
ALP SERPL-CCNC: 80 U/L (ref 50–136)
ALT SERPL-CCNC: 49 U/L (ref 12–65)
ALT SERPL-CCNC: 65 U/L (ref 12–65)
ANION GAP SERPL CALC-SCNC: 13 MMOL/L (ref 7–16)
ANION GAP SERPL CALC-SCNC: 9 MMOL/L (ref 7–16)
AST SERPL-CCNC: 29 U/L (ref 15–37)
AST SERPL-CCNC: 35 U/L (ref 15–37)
BACTERIA URNS QL MICRO: NORMAL /HPF
BASOPHILS # BLD: 0 K/UL (ref 0–0.2)
BASOPHILS NFR BLD: 0 % (ref 0–2)
BILIRUB SERPL-MCNC: 0.4 MG/DL (ref 0.2–1.1)
BILIRUB SERPL-MCNC: 0.6 MG/DL (ref 0.2–1.1)
BUN SERPL-MCNC: 15 MG/DL (ref 6–23)
BUN SERPL-MCNC: 15 MG/DL (ref 6–23)
CALCIUM SERPL-MCNC: 8.3 MG/DL (ref 8.3–10.4)
CALCIUM SERPL-MCNC: 9.1 MG/DL (ref 8.3–10.4)
CASTS URNS QL MICRO: 0 /LPF
CHLORIDE SERPL-SCNC: 103 MMOL/L (ref 98–107)
CHLORIDE SERPL-SCNC: 103 MMOL/L (ref 98–107)
CO2 SERPL-SCNC: 22 MMOL/L (ref 21–32)
CO2 SERPL-SCNC: 27 MMOL/L (ref 21–32)
CREAT SERPL-MCNC: 1.09 MG/DL (ref 0.6–1)
CREAT SERPL-MCNC: 1.58 MG/DL (ref 0.6–1)
DIFFERENTIAL METHOD BLD: ABNORMAL
EOSINOPHIL # BLD: 0 K/UL (ref 0–0.8)
EOSINOPHIL # BLD: 0 K/UL (ref 0–0.8)
EOSINOPHIL # BLD: 1.3 K/UL (ref 0–0.8)
EOSINOPHIL NFR BLD: 0 % (ref 0.5–7.8)
EOSINOPHIL NFR BLD: 0 % (ref 0.5–7.8)
EOSINOPHIL NFR BLD: 39 % (ref 0.5–7.8)
EPI CELLS #/AREA URNS HPF: NORMAL /HPF
ERYTHROCYTE [DISTWIDTH] IN BLOOD BY AUTOMATED COUNT: 12.1 % (ref 11.9–14.6)
ERYTHROCYTE [DISTWIDTH] IN BLOOD BY AUTOMATED COUNT: 12.3 % (ref 11.9–14.6)
ERYTHROCYTE [DISTWIDTH] IN BLOOD BY AUTOMATED COUNT: 12.4 % (ref 11.9–14.6)
GLOBULIN SER CALC-MCNC: 3.8 G/DL (ref 2.3–3.5)
GLOBULIN SER CALC-MCNC: 4.6 G/DL (ref 2.3–3.5)
GLUCOSE BLD STRIP.AUTO-MCNC: 142 MG/DL (ref 65–100)
GLUCOSE SERPL-MCNC: 208 MG/DL (ref 65–100)
GLUCOSE SERPL-MCNC: 220 MG/DL (ref 65–100)
HCG UR QL: NEGATIVE
HCT VFR BLD AUTO: 39.2 % (ref 35.8–46.3)
HCT VFR BLD AUTO: 39.3 % (ref 35.8–46.3)
HCT VFR BLD AUTO: 43.4 % (ref 35.8–46.3)
HGB BLD-MCNC: 13 G/DL (ref 11.7–15.4)
HGB BLD-MCNC: 13.1 G/DL (ref 11.7–15.4)
HGB BLD-MCNC: 14.3 G/DL (ref 11.7–15.4)
IMM GRANULOCYTES # BLD AUTO: 0 K/UL (ref 0–0.5)
IMM GRANULOCYTES # BLD AUTO: 0 K/UL (ref 0–0.5)
IMM GRANULOCYTES # BLD AUTO: 0.1 K/UL (ref 0–0.5)
IMM GRANULOCYTES NFR BLD AUTO: 0 % (ref 0–5)
IMM GRANULOCYTES NFR BLD AUTO: 0 % (ref 0–5)
IMM GRANULOCYTES NFR BLD AUTO: 2 % (ref 0–5)
LACTATE SERPL-SCNC: 6.3 MMOL/L (ref 0.4–2)
LYMPHOCYTES # BLD: 0.2 K/UL (ref 0.5–4.6)
LYMPHOCYTES # BLD: 0.2 K/UL (ref 0.5–4.6)
LYMPHOCYTES # BLD: 1 K/UL (ref 0.5–4.6)
LYMPHOCYTES NFR BLD: 5 % (ref 13–44)
LYMPHOCYTES NFR BLD: 6 % (ref 13–44)
LYMPHOCYTES NFR BLD: 7 % (ref 13–44)
MCH RBC QN AUTO: 30.2 PG (ref 26.1–32.9)
MCH RBC QN AUTO: 30.5 PG (ref 26.1–32.9)
MCH RBC QN AUTO: 30.8 PG (ref 26.1–32.9)
MCHC RBC AUTO-ENTMCNC: 32.9 G/DL (ref 31.4–35)
MCHC RBC AUTO-ENTMCNC: 33.1 G/DL (ref 31.4–35)
MCHC RBC AUTO-ENTMCNC: 33.4 G/DL (ref 31.4–35)
MCV RBC AUTO: 91.8 FL (ref 79.6–97.8)
MCV RBC AUTO: 92.2 FL (ref 79.6–97.8)
MCV RBC AUTO: 92.3 FL (ref 79.6–97.8)
MONOCYTES # BLD: 0 K/UL (ref 0.1–1.3)
MONOCYTES # BLD: 0 K/UL (ref 0.1–1.3)
MONOCYTES # BLD: 0.6 K/UL (ref 0.1–1.3)
MONOCYTES NFR BLD: 0 % (ref 4–12)
MONOCYTES NFR BLD: 1 % (ref 4–12)
MONOCYTES NFR BLD: 5 % (ref 4–12)
NEUTS SEG # BLD: 1.7 K/UL (ref 1.7–8.2)
NEUTS SEG # BLD: 11.9 K/UL (ref 1.7–8.2)
NEUTS SEG # BLD: 4.4 K/UL (ref 1.7–8.2)
NEUTS SEG NFR BLD: 53 % (ref 43–78)
NEUTS SEG NFR BLD: 88 % (ref 43–78)
NEUTS SEG NFR BLD: 94 % (ref 43–78)
NRBC # BLD: 0 K/UL (ref 0–0.2)
PLATELET # BLD AUTO: 125 K/UL (ref 150–450)
PLATELET # BLD AUTO: 136 K/UL (ref 150–450)
PLATELET # BLD AUTO: 239 K/UL (ref 150–450)
PLATELET COMMENTS,PCOM: SLIGHT
PMV BLD AUTO: 9.2 FL (ref 9.4–12.3)
PMV BLD AUTO: 9.4 FL (ref 9.4–12.3)
PMV BLD AUTO: 9.6 FL (ref 9.4–12.3)
POTASSIUM SERPL-SCNC: 3.5 MMOL/L (ref 3.5–5.1)
POTASSIUM SERPL-SCNC: 3.7 MMOL/L (ref 3.5–5.1)
PROCALCITONIN SERPL-MCNC: 15.2 NG/ML
PROT SERPL-MCNC: 6.8 G/DL (ref 6.3–8.2)
PROT SERPL-MCNC: 8.3 G/DL (ref 6.3–8.2)
RBC # BLD AUTO: 4.25 M/UL (ref 4.05–5.2)
RBC # BLD AUTO: 4.26 M/UL (ref 4.05–5.2)
RBC # BLD AUTO: 4.73 M/UL (ref 4.05–5.2)
RBC #/AREA URNS HPF: NORMAL /HPF
RBC MORPH BLD: ABNORMAL
SODIUM SERPL-SCNC: 138 MMOL/L (ref 136–145)
SODIUM SERPL-SCNC: 139 MMOL/L (ref 136–145)
WBC # BLD AUTO: 13.6 K/UL (ref 4.3–11.1)
WBC # BLD AUTO: 3.3 K/UL (ref 4.3–11.1)
WBC # BLD AUTO: 4.6 K/UL (ref 4.3–11.1)
WBC MORPH BLD: ABNORMAL
WBC URNS QL MICRO: NORMAL /HPF

## 2019-11-23 PROCEDURE — 83605 ASSAY OF LACTIC ACID: CPT

## 2019-11-23 PROCEDURE — 84145 PROCALCITONIN (PCT): CPT

## 2019-11-23 PROCEDURE — 81015 MICROSCOPIC EXAM OF URINE: CPT

## 2019-11-23 PROCEDURE — 87205 SMEAR GRAM STAIN: CPT

## 2019-11-23 PROCEDURE — 80053 COMPREHEN METABOLIC PANEL: CPT

## 2019-11-23 PROCEDURE — 87077 CULTURE AEROBIC IDENTIFY: CPT

## 2019-11-23 PROCEDURE — 87040 BLOOD CULTURE FOR BACTERIA: CPT

## 2019-11-23 PROCEDURE — 96374 THER/PROPH/DIAG INJ IV PUSH: CPT | Performed by: EMERGENCY MEDICINE

## 2019-11-23 PROCEDURE — 65660000000 HC RM CCU STEPDOWN

## 2019-11-23 PROCEDURE — 74011636320 HC RX REV CODE- 636/320: Performed by: EMERGENCY MEDICINE

## 2019-11-23 PROCEDURE — 81025 URINE PREGNANCY TEST: CPT

## 2019-11-23 PROCEDURE — 96361 HYDRATE IV INFUSION ADD-ON: CPT | Performed by: EMERGENCY MEDICINE

## 2019-11-23 PROCEDURE — 74011000258 HC RX REV CODE- 258: Performed by: EMERGENCY MEDICINE

## 2019-11-23 PROCEDURE — 96375 TX/PRO/DX INJ NEW DRUG ADDON: CPT | Performed by: EMERGENCY MEDICINE

## 2019-11-23 PROCEDURE — 74011250636 HC RX REV CODE- 250/636: Performed by: UROLOGY

## 2019-11-23 PROCEDURE — 87186 SC STD MICRODIL/AGAR DIL: CPT

## 2019-11-23 PROCEDURE — 74011250637 HC RX REV CODE- 250/637: Performed by: UROLOGY

## 2019-11-23 PROCEDURE — 82962 GLUCOSE BLOOD TEST: CPT

## 2019-11-23 PROCEDURE — 74177 CT ABD & PELVIS W/CONTRAST: CPT

## 2019-11-23 PROCEDURE — 87150 DNA/RNA AMPLIFIED PROBE: CPT

## 2019-11-23 PROCEDURE — 85025 COMPLETE CBC W/AUTO DIFF WBC: CPT

## 2019-11-23 PROCEDURE — 71045 X-RAY EXAM CHEST 1 VIEW: CPT

## 2019-11-23 PROCEDURE — 93005 ELECTROCARDIOGRAM TRACING: CPT | Performed by: UROLOGY

## 2019-11-23 PROCEDURE — 36415 COLL VENOUS BLD VENIPUNCTURE: CPT

## 2019-11-23 PROCEDURE — 81003 URINALYSIS AUTO W/O SCOPE: CPT | Performed by: EMERGENCY MEDICINE

## 2019-11-23 PROCEDURE — 99284 EMERGENCY DEPT VISIT MOD MDM: CPT | Performed by: EMERGENCY MEDICINE

## 2019-11-23 PROCEDURE — 74011250636 HC RX REV CODE- 250/636: Performed by: EMERGENCY MEDICINE

## 2019-11-23 RX ORDER — HYDROMORPHONE HYDROCHLORIDE 1 MG/ML
1 INJECTION, SOLUTION INTRAMUSCULAR; INTRAVENOUS; SUBCUTANEOUS
Status: DISCONTINUED | OUTPATIENT
Start: 2019-11-23 | End: 2019-11-24 | Stop reason: SDUPTHER

## 2019-11-23 RX ORDER — HYDROMORPHONE HYDROCHLORIDE 1 MG/ML
1 INJECTION, SOLUTION INTRAMUSCULAR; INTRAVENOUS; SUBCUTANEOUS ONCE
Status: COMPLETED | OUTPATIENT
Start: 2019-11-23 | End: 2019-11-23

## 2019-11-23 RX ORDER — ONDANSETRON 2 MG/ML
4 INJECTION INTRAMUSCULAR; INTRAVENOUS
Status: DISCONTINUED | OUTPATIENT
Start: 2019-11-23 | End: 2019-11-28 | Stop reason: HOSPADM

## 2019-11-23 RX ORDER — DIPHENHYDRAMINE HYDROCHLORIDE 50 MG/ML
12.5 INJECTION, SOLUTION INTRAMUSCULAR; INTRAVENOUS
Status: DISCONTINUED | OUTPATIENT
Start: 2019-11-23 | End: 2019-11-28 | Stop reason: HOSPADM

## 2019-11-23 RX ORDER — LORAZEPAM 2 MG/ML
1 INJECTION INTRAMUSCULAR
Status: DISCONTINUED | OUTPATIENT
Start: 2019-11-23 | End: 2019-11-28 | Stop reason: HOSPADM

## 2019-11-23 RX ORDER — SODIUM CHLORIDE 0.9 % (FLUSH) 0.9 %
5-40 SYRINGE (ML) INJECTION AS NEEDED
Status: DISCONTINUED | OUTPATIENT
Start: 2019-11-23 | End: 2019-11-24 | Stop reason: SDUPTHER

## 2019-11-23 RX ORDER — NALOXONE HYDROCHLORIDE 0.4 MG/ML
0.4 INJECTION, SOLUTION INTRAMUSCULAR; INTRAVENOUS; SUBCUTANEOUS AS NEEDED
Status: DISCONTINUED | OUTPATIENT
Start: 2019-11-23 | End: 2019-11-24 | Stop reason: SDUPTHER

## 2019-11-23 RX ORDER — TAMSULOSIN HYDROCHLORIDE 0.4 MG/1
0.4 CAPSULE ORAL DAILY
Status: DISCONTINUED | OUTPATIENT
Start: 2019-11-23 | End: 2019-11-25

## 2019-11-23 RX ORDER — HYDROCODONE BITARTRATE AND ACETAMINOPHEN 5; 325 MG/1; MG/1
1 TABLET ORAL
Status: DISCONTINUED | OUTPATIENT
Start: 2019-11-23 | End: 2019-11-28 | Stop reason: HOSPADM

## 2019-11-23 RX ORDER — KETOROLAC TROMETHAMINE 30 MG/ML
30 INJECTION, SOLUTION INTRAMUSCULAR; INTRAVENOUS ONCE
Status: COMPLETED | OUTPATIENT
Start: 2019-11-23 | End: 2019-11-23

## 2019-11-23 RX ORDER — HYDROMORPHONE HYDROCHLORIDE 1 MG/ML
0.5 INJECTION, SOLUTION INTRAMUSCULAR; INTRAVENOUS; SUBCUTANEOUS ONCE
Status: COMPLETED | OUTPATIENT
Start: 2019-11-23 | End: 2019-11-23

## 2019-11-23 RX ORDER — SODIUM CHLORIDE 0.9 % (FLUSH) 0.9 %
10 SYRINGE (ML) INJECTION
Status: COMPLETED | OUTPATIENT
Start: 2019-11-23 | End: 2019-11-23

## 2019-11-23 RX ORDER — SODIUM CHLORIDE 0.9 % (FLUSH) 0.9 %
5-40 SYRINGE (ML) INJECTION EVERY 8 HOURS
Status: DISCONTINUED | OUTPATIENT
Start: 2019-11-23 | End: 2019-11-24 | Stop reason: SDUPTHER

## 2019-11-23 RX ORDER — SODIUM CHLORIDE 9 MG/ML
75 INJECTION, SOLUTION INTRAVENOUS CONTINUOUS
Status: DISCONTINUED | OUTPATIENT
Start: 2019-11-23 | End: 2019-11-27

## 2019-11-23 RX ORDER — ACETAMINOPHEN 325 MG/1
650 TABLET ORAL
Status: DISCONTINUED | OUTPATIENT
Start: 2019-11-23 | End: 2019-11-25

## 2019-11-23 RX ORDER — SODIUM CHLORIDE 0.9 % (FLUSH) 0.9 %
5-10 SYRINGE (ML) INJECTION AS NEEDED
Status: DISCONTINUED | OUTPATIENT
Start: 2019-11-23 | End: 2019-11-28 | Stop reason: HOSPADM

## 2019-11-23 RX ORDER — OXYBUTYNIN CHLORIDE 5 MG/1
5 TABLET ORAL
Status: DISCONTINUED | OUTPATIENT
Start: 2019-11-23 | End: 2019-11-28 | Stop reason: HOSPADM

## 2019-11-23 RX ADMIN — Medication 10 ML: at 22:07

## 2019-11-23 RX ADMIN — HYDROMORPHONE HYDROCHLORIDE 0.5 MG: 1 INJECTION, SOLUTION INTRAMUSCULAR; INTRAVENOUS; SUBCUTANEOUS at 17:36

## 2019-11-23 RX ADMIN — SODIUM CHLORIDE 125 ML/HR: 900 INJECTION, SOLUTION INTRAVENOUS at 21:00

## 2019-11-23 RX ADMIN — ONDANSETRON 4 MG: 2 INJECTION INTRAMUSCULAR; INTRAVENOUS at 20:58

## 2019-11-23 RX ADMIN — TAMSULOSIN HYDROCHLORIDE 0.4 MG: 0.4 CAPSULE ORAL at 20:58

## 2019-11-23 RX ADMIN — KETOROLAC TROMETHAMINE 30 MG: 30 INJECTION, SOLUTION INTRAMUSCULAR at 18:46

## 2019-11-23 RX ADMIN — HYDROMORPHONE HYDROCHLORIDE 1 MG: 1 INJECTION, SOLUTION INTRAMUSCULAR; INTRAVENOUS; SUBCUTANEOUS at 18:06

## 2019-11-23 RX ADMIN — ACETAMINOPHEN 650 MG: 325 TABLET, FILM COATED ORAL at 22:57

## 2019-11-23 RX ADMIN — OXYBUTYNIN CHLORIDE 5 MG: 5 TABLET ORAL at 20:58

## 2019-11-23 RX ADMIN — IOPAMIDOL 100 ML: 755 INJECTION, SOLUTION INTRAVENOUS at 18:22

## 2019-11-23 RX ADMIN — Medication 10 ML: at 18:22

## 2019-11-23 RX ADMIN — SODIUM CHLORIDE 100 ML: 900 INJECTION, SOLUTION INTRAVENOUS at 18:22

## 2019-11-23 RX ADMIN — HYDROCODONE BITARTRATE AND ACETAMINOPHEN 1 TABLET: 5; 325 TABLET ORAL at 20:58

## 2019-11-23 RX ADMIN — SODIUM CHLORIDE 1000 ML: 900 INJECTION, SOLUTION INTRAVENOUS at 17:36

## 2019-11-23 NOTE — ED PROVIDER NOTES
69-year-old female presenting for rather abrupt onset right lower quadrant abdominal pain that radiates to her back. At this time the patient is very uncomfortable and information is limited due to language barrier. She has an non-peritoneal abdomen. In triage the patient reported vaginal pain and discharge when I speak with her she points to her right lower quadrant    7:06 PM  Finally found [facility], and able to get through and connect with the . Patient has had 10 AM.  Is been associated with persistent vomiting and will have 30 to 45 seconds of reprieve but pain will almost instantaneously come back. She is had vomiting. She has not eaten since yesterday. She denies any fevers or chills but the pain is in her right flank and radiates into her abdomen. She feels like it is ripping. Flank Pain    Associated symptoms include abdominal pain. Vaginal Pain    Associated symptoms include abdominal pain. Vaginal Discharge    Associated symptoms include abdominal pain. Past Medical History:   Diagnosis Date    Diabetes [facility])        Past Surgical History:   Procedure Laterality Date    HX DILATION AND CURETTAGE           No family history on file.     Social History     Socioeconomic History    Marital status:      Spouse name: Not on file    Number of children: Not on file    Years of education: Not on file    Highest education level: Not on file   Occupational History    Not on file   Social Needs    Financial resource strain: Not on file    Food insecurity:     Worry: Not on file     Inability: Not on file    Transportation needs:     Medical: Not on file     Non-medical: Not on file   Tobacco Use    Smoking status: Never Smoker   Substance and Sexual Activity    Alcohol use: Not on file    Drug use: Not on file    Sexual activity: Not on file   Lifestyle    Physical activity:     Days per week: Not on file     Minutes per session: Not on file    Stress: Not on file   Relationships    Social connections:     Talks on phone: Not on file     Gets together: Not on file     Attends Protestant service: Not on file     Active member of club or organization: Not on file     Attends meetings of clubs or organizations: Not on file     Relationship status: Not on file    Intimate partner violence:     Fear of current or ex partner: Not on file     Emotionally abused: Not on file     Physically abused: Not on file     Forced sexual activity: Not on file   Other Topics Concern    Not on file   Social History Narrative    Not on file         ALLERGIES: Patient has no known allergies. Review of Systems   Gastrointestinal: Positive for abdominal pain. Genitourinary: Positive for flank pain and vaginal discharge. All other systems reviewed and are negative. Vitals:    11/23/19 1527 11/23/19 1634   BP: 147/88 132/80   Pulse: 87 89   Resp: 22    Temp: 98.6 °F (37 °C)    SpO2: 100% 100%            Physical Exam  Vitals signs and nursing note reviewed. Constitutional:       General: She is in acute distress. Appearance: She is well-developed. She is obese. She is not toxic-appearing or diaphoretic. Comments: Rolling around on the bed and discomfort   HENT:      Head: Normocephalic and atraumatic. Nose: Nose normal.   Eyes:      Conjunctiva/sclera: Conjunctivae normal.      Pupils: Pupils are equal, round, and reactive to light. Neck:      Musculoskeletal: Normal range of motion and neck supple. Cardiovascular:      Rate and Rhythm: Normal rate and regular rhythm. Pulmonary:      Effort: Pulmonary effort is normal.      Breath sounds: Normal breath sounds. Abdominal:      General: Bowel sounds are normal.      Palpations: Abdomen is soft. Tenderness: There is tenderness. There is no guarding or rebound. Comments: Tenderness in the right lower quadrant without mass   Musculoskeletal: Normal range of motion.    Skin:     General: Skin is warm and dry.   Neurological:      Mental Status: She is alert and oriented to person, place, and time. MDM  Number of Diagnoses or Management Options  Ureterolithiasis:   Diagnosis management comments: 41-year-old female presenting for fairly significant right lower quadrant abdominal pain. Patient reported history of kidney stones in triage but a review of the medical record reveals CTs performed without evidence of stone  Differential includes kidney stone, diverticulitis, urinary tract infection, ovarian torsion, appendicitis, ruptured ectopic pregnancy, tubo-ovarian abscess, pelvic inflammatory disease.        Amount and/or Complexity of Data Reviewed  Clinical lab tests: ordered and reviewed (Results for orders placed or performed during the hospital encounter of 11/23/19  -CBC WITH AUTOMATED DIFF       Result                      Value             Ref Range           WBC                         13.6 (H)          4.3 - 11.1 K*       RBC                         4.73              4.05 - 5.2 M*       HGB                         14.3              11.7 - 15.4 *       HCT                         43.4              35.8 - 46.3 %       MCV                         91.8              79.6 - 97.8 *       MCH                         30.2              26.1 - 32.9 *       MCHC                        32.9              31.4 - 35.0 *       RDW                         12.1              11.9 - 14.6 %       PLATELET                    239               150 - 450 K/*       MPV                         9.6               9.4 - 12.3 FL       ABSOLUTE NRBC               0.00              0.0 - 0.2 K/*       DF                          AUTOMATED                             NEUTROPHILS                 88 (H)            43 - 78 %           LYMPHOCYTES                 7 (L)             13 - 44 %           MONOCYTES                   5                 4.0 - 12.0 %        EOSINOPHILS                 0 (L)             0.5 - 7.8 % BASOPHILS                   0                 0.0 - 2.0 %         IMMATURE GRANULOCYTES       0                 0.0 - 5.0 %         ABS. NEUTROPHILS            11.9 (H)          1.7 - 8.2 K/*       ABS. LYMPHOCYTES            1.0               0.5 - 4.6 K/*       ABS. MONOCYTES              0.6               0.1 - 1.3 K/*       ABS. EOSINOPHILS            0.0               0.0 - 0.8 K/*       ABS. BASOPHILS              0.0               0.0 - 0.2 K/*       ABS. IMM. GRANS.            0.0               0.0 - 0.5 K/*  -METABOLIC PANEL, COMPREHENSIVE       Result                      Value             Ref Range           Sodium                      139               136 - 145 mm*       Potassium                   3.7               3.5 - 5.1 mm*       Chloride                    103               98 - 107 mmo*       CO2                         27                21 - 32 mmol*       Anion gap                   9                 7 - 16 mmol/L       Glucose                     220 (H)           65 - 100 mg/*       BUN                         15                6 - 23 MG/DL        Creatinine                  1.09 (H)          0.6 - 1.0 MG*       GFR est AA                  >60               >60 ml/min/1*       GFR est non-AA              59 (L)            >60 ml/min/1*       Calcium                     9.1               8.3 - 10.4 M*       Bilirubin, total            0.4               0.2 - 1.1 MG*       ALT (SGPT)                  65                12 - 65 U/L         AST (SGOT)                  35                15 - 37 U/L         Alk.  phosphatase            71                50 - 136 U/L        Protein, total              8.3 (H)           6.3 - 8.2 g/*       Albumin                     3.7               3.5 - 5.0 g/*       Globulin                    4.6 (H)           2.3 - 3.5 g/*       A-G Ratio                   0.8 (L)           1.2 - 3.5      -URINE MICROSCOPIC       Result                      Value Ref Range           WBC                         3-5               0 /hpf              RBC                         0-3               0 /hpf              Epithelial cells            3-5               0 /hpf              Bacteria                    TRACE             0 /hpf              Casts                       0                 0 /lpf         -HCG URINE, QL. - POC       Result                      Value             Ref Range           Pregnancy test,urine (*     NEGATIVE          NEG            )  Tests in the radiology section of CPT®: ordered and reviewed (Ct Abd Pelv W Cont    Result Date: 11/23/2019  CT ABDOMEN AND PELVIS WITH CONTRAST HISTORY: abd pain, 38 years Female COMPARISON: CT abdomen November 05, 2017 TECHNIQUE: Oral contrast was not administered. 100 cc of nonionic intravenous contrast was injected, and axial helical CT images were obtained from above the diaphragm through the pelvis. Coronal reformatted images were obtained at the scanner console and made available for review. All CT scans at this location are performed using dose modulation techniques as appropriate to a performed exam including the following: automated exposure control; adjustment of the mA and/or kV according to patient's size (this includes techniques or standardized protocols for targeted exams where dose is matched to indication/reason for exam; i.e. extremities or head); use of iterative reconstruction technique. FINDINGS: ABDOMEN: Minimal dependent subsegmental atelectasis bilateral lung bases. Normal-appearing liver, gallbladder, spleen, pancreas, bilateral adrenal glands and left kidney. Mild right hydronephrosis and hydroureter, cause by a small partially obstructing calculus in the distal right ureter just proximal to the ureterovesical junction measuring 5 x 6 mm. There may be a second partially obstructing calculus measuring 2 mm in diameter in the distal right ureter just superior to this larger calculus.   Mild calcific atherosclerosis of a normal caliber abdominal aorta. No evidence of significant lymphadenopathy. Normal-appearing small bowel. No evidence of intraperitoneal free air or free fluid. Image quality somewhat degraded by respiratory motion artifact. PELVIS: Normal-appearing urinary bladder. Normal-appearing uterus and bilateral adnexa. Normal-appearing colon and appendix. No evidence of pelvic free fluid. No evidence of significant inguinal or pelvic sidewall lymphadenopathy. Visualized osseous structures unremarkable. IMPRESSION: 1. Acute mild right hydronephrosis caused by 2 small partially obstructing calculi in the distal right ureter just proximal to the UVJ. 2.  Other chronic findings as above.     )  Discuss the patient with other providers: yes (Discussed case with Dr. Devaughn Claude who will admit the patient)  Independent visualization of images, tracings, or specimens: yes    Risk of Complications, Morbidity, and/or Mortality  Presenting problems: high  Diagnostic procedures: high  Management options: high  General comments: I personally reviewed the patient's vital signs, laboratory tests, and/or radiological findings. I discussed these findings with the patient and their significance. I answered all questions and explained that given these findings there is significant concern for increased morbidity and/or mortality without immediate intervention.   As a result, I recommended admission to the hospital, consulted the appropriate service, and transitioned care to that service in improved condition      Patient Progress  Patient progress: improved    ED Course as of Nov 23 1906   Sat Nov 23, 2019   1837 Reviewed the patient's CT and she appears to have a large ureterolithiasis with hydro-    [JS]   [address] [facility] urology due to the patient's poorly controlled pain and a 5 x 6 ureterolithiasis    [JS]      ED Course User Index  [JS] Jeniffer Houser MD       Procedures

## 2019-11-24 ENCOUNTER — ANESTHESIA EVENT (OUTPATIENT)
Dept: SURGERY | Age: 39
DRG: 853 | End: 2019-11-24
Payer: SELF-PAY

## 2019-11-24 ENCOUNTER — ANESTHESIA (OUTPATIENT)
Dept: SURGERY | Age: 39
DRG: 853 | End: 2019-11-24
Payer: SELF-PAY

## 2019-11-24 ENCOUNTER — APPOINTMENT (OUTPATIENT)
Dept: GENERAL RADIOLOGY | Age: 39
DRG: 853 | End: 2019-11-24
Attending: INTERNAL MEDICINE
Payer: SELF-PAY

## 2019-11-24 PROBLEM — A41.9 SEPTIC SHOCK (HCC): Status: ACTIVE | Noted: 2019-11-24

## 2019-11-24 PROBLEM — N13.1 HYDRONEPHROSIS DUE TO OBSTRUCTION OF URETER: Status: ACTIVE | Noted: 2019-11-24

## 2019-11-24 PROBLEM — R65.21 SEPTIC SHOCK (HCC): Status: ACTIVE | Noted: 2019-11-24

## 2019-11-24 PROBLEM — E11.9 DIABETES (HCC): Status: ACTIVE | Noted: 2019-11-24

## 2019-11-24 PROBLEM — N17.9 AKI (ACUTE KIDNEY INJURY) (HCC): Status: ACTIVE | Noted: 2019-11-24

## 2019-11-24 LAB
ALBUMIN SERPL-MCNC: 2.1 G/DL (ref 3.5–5)
ALBUMIN SERPL-MCNC: 2.3 G/DL (ref 3.5–5)
ALBUMIN/GLOB SERPL: 0.7 {RATIO} (ref 1.2–3.5)
ALP SERPL-CCNC: 44 U/L (ref 50–136)
ALT SERPL-CCNC: 35 U/L (ref 12–65)
ANION GAP SERPL CALC-SCNC: 11 MMOL/L (ref 7–16)
ANION GAP SERPL CALC-SCNC: 12 MMOL/L (ref 7–16)
ANION GAP SERPL CALC-SCNC: 9 MMOL/L (ref 7–16)
AST SERPL-CCNC: 23 U/L (ref 15–37)
ATRIAL RATE: 137 BPM
BILIRUB SERPL-MCNC: 0.7 MG/DL (ref 0.2–1.1)
BUN SERPL-MCNC: 16 MG/DL (ref 6–23)
BUN SERPL-MCNC: 16 MG/DL (ref 6–23)
BUN SERPL-MCNC: 18 MG/DL (ref 6–23)
CALCIUM SERPL-MCNC: 6.5 MG/DL (ref 8.3–10.4)
CALCIUM SERPL-MCNC: 6.5 MG/DL (ref 8.3–10.4)
CALCIUM SERPL-MCNC: 7 MG/DL (ref 8.3–10.4)
CALCULATED R AXIS, ECG10: 22 DEGREES
CALCULATED T AXIS, ECG11: 55 DEGREES
CHLORIDE SERPL-SCNC: 110 MMOL/L (ref 98–107)
CHLORIDE SERPL-SCNC: 113 MMOL/L (ref 98–107)
CHLORIDE SERPL-SCNC: 113 MMOL/L (ref 98–107)
CO2 SERPL-SCNC: 19 MMOL/L (ref 21–32)
CO2 SERPL-SCNC: 20 MMOL/L (ref 21–32)
CO2 SERPL-SCNC: 20 MMOL/L (ref 21–32)
CREAT SERPL-MCNC: 1.19 MG/DL (ref 0.6–1)
CREAT SERPL-MCNC: 1.43 MG/DL (ref 0.6–1)
CREAT SERPL-MCNC: 1.59 MG/DL (ref 0.6–1)
DIAGNOSIS, 93000: NORMAL
EST. AVERAGE GLUCOSE BLD GHB EST-MCNC: 197 MG/DL
GLOBULIN SER CALC-MCNC: 2.9 G/DL (ref 2.3–3.5)
GLUCOSE BLD STRIP.AUTO-MCNC: 183 MG/DL (ref 65–100)
GLUCOSE BLD STRIP.AUTO-MCNC: 192 MG/DL (ref 65–100)
GLUCOSE BLD STRIP.AUTO-MCNC: 196 MG/DL (ref 65–100)
GLUCOSE BLD STRIP.AUTO-MCNC: 259 MG/DL (ref 65–100)
GLUCOSE SERPL-MCNC: 213 MG/DL (ref 65–100)
GLUCOSE SERPL-MCNC: 225 MG/DL (ref 65–100)
GLUCOSE SERPL-MCNC: 235 MG/DL (ref 65–100)
HBA1C MFR BLD: 8.5 % (ref 4.8–6)
LACTATE SERPL-SCNC: 2.1 MMOL/L (ref 0.4–2)
LACTATE SERPL-SCNC: 3.5 MMOL/L (ref 0.4–2)
LACTATE SERPL-SCNC: 4.5 MMOL/L (ref 0.4–2)
LACTATE SERPL-SCNC: 4.8 MMOL/L (ref 0.4–2)
LACTATE SERPL-SCNC: 6 MMOL/L (ref 0.4–2)
LACTATE SERPL-SCNC: 6.7 MMOL/L (ref 0.4–2)
P-R INTERVAL, ECG05: 120 MS
POTASSIUM SERPL-SCNC: 3.3 MMOL/L (ref 3.5–5.1)
POTASSIUM SERPL-SCNC: 3.3 MMOL/L (ref 3.5–5.1)
POTASSIUM SERPL-SCNC: 3.7 MMOL/L (ref 3.5–5.1)
PROT SERPL-MCNC: 5 G/DL (ref 6.3–8.2)
Q-T INTERVAL, ECG07: 366 MS
QRS DURATION, ECG06: 74 MS
QTC CALCULATION (BEZET), ECG08: 552 MS
SODIUM SERPL-SCNC: 142 MMOL/L (ref 136–145)
SODIUM SERPL-SCNC: 142 MMOL/L (ref 136–145)
SODIUM SERPL-SCNC: 143 MMOL/L (ref 136–145)
VENTRICULAR RATE, ECG03: 137 BPM

## 2019-11-24 PROCEDURE — 77030034696 HC CATH URETH FOL 2W BARD -A: Performed by: UROLOGY

## 2019-11-24 PROCEDURE — 99255 IP/OBS CONSLTJ NEW/EST HI 80: CPT | Performed by: INTERNAL MEDICINE

## 2019-11-24 PROCEDURE — 74011250636 HC RX REV CODE- 250/636: Performed by: UROLOGY

## 2019-11-24 PROCEDURE — 77030020263 HC SOL INJ SOD CL0.9% LFCR 1000ML

## 2019-11-24 PROCEDURE — 36573 INSJ PICC RS&I 5 YR+: CPT | Performed by: INTERNAL MEDICINE

## 2019-11-24 PROCEDURE — 82040 ASSAY OF SERUM ALBUMIN: CPT

## 2019-11-24 PROCEDURE — 36592 COLLECT BLOOD FROM PICC: CPT

## 2019-11-24 PROCEDURE — 87186 SC STD MICRODIL/AGAR DIL: CPT

## 2019-11-24 PROCEDURE — 74011000258 HC RX REV CODE- 258: Performed by: HOSPITALIST

## 2019-11-24 PROCEDURE — 77030019908 HC STETH ESOPH SIMS -A: Performed by: ANESTHESIOLOGY

## 2019-11-24 PROCEDURE — 83605 ASSAY OF LACTIC ACID: CPT

## 2019-11-24 PROCEDURE — 87086 URINE CULTURE/COLONY COUNT: CPT

## 2019-11-24 PROCEDURE — 76010000138 HC OR TIME 0.5 TO 1 HR: Performed by: UROLOGY

## 2019-11-24 PROCEDURE — 74011000258 HC RX REV CODE- 258: Performed by: UROLOGY

## 2019-11-24 PROCEDURE — 83036 HEMOGLOBIN GLYCOSYLATED A1C: CPT

## 2019-11-24 PROCEDURE — 65610000001 HC ROOM ICU GENERAL

## 2019-11-24 PROCEDURE — 74011636637 HC RX REV CODE- 636/637: Performed by: INTERNAL MEDICINE

## 2019-11-24 PROCEDURE — 80048 BASIC METABOLIC PNL TOTAL CA: CPT

## 2019-11-24 PROCEDURE — C1751 CATH, INF, PER/CENT/MIDLINE: HCPCS

## 2019-11-24 PROCEDURE — 87088 URINE BACTERIA CULTURE: CPT

## 2019-11-24 PROCEDURE — 77030018846 HC SOL IRR STRL H20 ICUM -A: Performed by: UROLOGY

## 2019-11-24 PROCEDURE — C1769 GUIDE WIRE: HCPCS | Performed by: UROLOGY

## 2019-11-24 PROCEDURE — 74011000258 HC RX REV CODE- 258: Performed by: ANESTHESIOLOGY

## 2019-11-24 PROCEDURE — 74011250636 HC RX REV CODE- 250/636: Performed by: HOSPITALIST

## 2019-11-24 PROCEDURE — 77010033678 HC OXYGEN DAILY

## 2019-11-24 PROCEDURE — 71045 X-RAY EXAM CHEST 1 VIEW: CPT

## 2019-11-24 PROCEDURE — 77030018832 HC SOL IRR H20 ICUM -A: Performed by: UROLOGY

## 2019-11-24 PROCEDURE — 74011250636 HC RX REV CODE- 250/636: Performed by: ANESTHESIOLOGY

## 2019-11-24 PROCEDURE — 74011250636 HC RX REV CODE- 250/636: Performed by: INTERNAL MEDICINE

## 2019-11-24 PROCEDURE — C2617 STENT, NON-COR, TEM W/O DEL: HCPCS | Performed by: UROLOGY

## 2019-11-24 PROCEDURE — 0T768DZ DILATION OF RIGHT URETER WITH INTRALUMINAL DEVICE, VIA NATURAL OR ARTIFICIAL OPENING ENDOSCOPIC: ICD-10-PCS | Performed by: UROLOGY

## 2019-11-24 PROCEDURE — 36415 COLL VENOUS BLD VENIPUNCTURE: CPT

## 2019-11-24 PROCEDURE — 02HV33Z INSERTION OF INFUSION DEVICE INTO SUPERIOR VENA CAVA, PERCUTANEOUS APPROACH: ICD-10-PCS | Performed by: INTERNAL MEDICINE

## 2019-11-24 PROCEDURE — 77030037088 HC TUBE ENDOTRACH ORAL NSL COVD-A: Performed by: ANESTHESIOLOGY

## 2019-11-24 PROCEDURE — 74011250637 HC RX REV CODE- 250/637: Performed by: UROLOGY

## 2019-11-24 PROCEDURE — 80053 COMPREHEN METABOLIC PANEL: CPT

## 2019-11-24 PROCEDURE — 77030020255 HC SOL INJ LR 1000ML BG

## 2019-11-24 PROCEDURE — 82962 GLUCOSE BLOOD TEST: CPT

## 2019-11-24 PROCEDURE — P9047 ALBUMIN (HUMAN), 25%, 50ML: HCPCS | Performed by: INTERNAL MEDICINE

## 2019-11-24 PROCEDURE — 77030019927 HC TBNG IRR CYSTO BAXT -A: Performed by: UROLOGY

## 2019-11-24 PROCEDURE — 77030039425 HC BLD LARYNG TRULITE DISP TELE -A: Performed by: ANESTHESIOLOGY

## 2019-11-24 PROCEDURE — 74011000250 HC RX REV CODE- 250: Performed by: HOSPITALIST

## 2019-11-24 PROCEDURE — 74011000250 HC RX REV CODE- 250: Performed by: INTERNAL MEDICINE

## 2019-11-24 PROCEDURE — 76060000032 HC ANESTHESIA 0.5 TO 1 HR: Performed by: UROLOGY

## 2019-11-24 PROCEDURE — 74011000250 HC RX REV CODE- 250: Performed by: ANESTHESIOLOGY

## 2019-11-24 PROCEDURE — 77030029359 HC PRB ESOPH TEMP CATH ANTM -F: Performed by: ANESTHESIOLOGY

## 2019-11-24 PROCEDURE — 74011000250 HC RX REV CODE- 250: Performed by: UROLOGY

## 2019-11-24 DEVICE — URETERAL STENT
Type: IMPLANTABLE DEVICE | Site: URETER | Status: FUNCTIONAL
Brand: PERCUFLEX™ PLUS

## 2019-11-24 RX ORDER — SODIUM CHLORIDE 0.9 % (FLUSH) 0.9 %
5-40 SYRINGE (ML) INJECTION AS NEEDED
Status: DISCONTINUED | OUTPATIENT
Start: 2019-11-24 | End: 2019-11-28 | Stop reason: HOSPADM

## 2019-11-24 RX ORDER — SODIUM CHLORIDE, SODIUM LACTATE, POTASSIUM CHLORIDE, CALCIUM CHLORIDE 600; 310; 30; 20 MG/100ML; MG/100ML; MG/100ML; MG/100ML
150 INJECTION, SOLUTION INTRAVENOUS CONTINUOUS
Status: DISCONTINUED | OUTPATIENT
Start: 2019-11-24 | End: 2019-11-25

## 2019-11-24 RX ORDER — NALOXONE HYDROCHLORIDE 0.4 MG/ML
0.4 INJECTION, SOLUTION INTRAMUSCULAR; INTRAVENOUS; SUBCUTANEOUS AS NEEDED
Status: DISCONTINUED | OUTPATIENT
Start: 2019-11-24 | End: 2019-11-28 | Stop reason: HOSPADM

## 2019-11-24 RX ORDER — HEPARIN 100 UNIT/ML
900 SYRINGE INTRAVENOUS AS NEEDED
Status: DISCONTINUED | OUTPATIENT
Start: 2019-11-24 | End: 2019-11-28 | Stop reason: HOSPADM

## 2019-11-24 RX ORDER — KETOROLAC TROMETHAMINE 15 MG/ML
15 INJECTION, SOLUTION INTRAMUSCULAR; INTRAVENOUS
Status: DISCONTINUED | OUTPATIENT
Start: 2019-11-24 | End: 2019-11-28 | Stop reason: HOSPADM

## 2019-11-24 RX ORDER — ALBUMIN HUMAN 250 G/1000ML
25 SOLUTION INTRAVENOUS
Status: COMPLETED | OUTPATIENT
Start: 2019-11-24 | End: 2019-11-24

## 2019-11-24 RX ORDER — POTASSIUM CHLORIDE 14.9 MG/ML
20 INJECTION INTRAVENOUS
Status: COMPLETED | OUTPATIENT
Start: 2019-11-24 | End: 2019-11-24

## 2019-11-24 RX ORDER — INSULIN LISPRO 100 [IU]/ML
0-10 INJECTION, SOLUTION INTRAVENOUS; SUBCUTANEOUS
Status: DISCONTINUED | OUTPATIENT
Start: 2019-11-24 | End: 2019-11-28 | Stop reason: HOSPADM

## 2019-11-24 RX ORDER — DEXTROSE 40 %
15 GEL (GRAM) ORAL AS NEEDED
Status: DISCONTINUED | OUTPATIENT
Start: 2019-11-24 | End: 2019-11-28 | Stop reason: HOSPADM

## 2019-11-24 RX ORDER — HEPARIN 100 UNIT/ML
900 SYRINGE INTRAVENOUS EVERY 8 HOURS
Status: DISCONTINUED | OUTPATIENT
Start: 2019-11-24 | End: 2019-11-25

## 2019-11-24 RX ORDER — SODIUM CHLORIDE 0.9 % (FLUSH) 0.9 %
5-40 SYRINGE (ML) INJECTION EVERY 8 HOURS
Status: DISCONTINUED | OUTPATIENT
Start: 2019-11-24 | End: 2019-11-28 | Stop reason: HOSPADM

## 2019-11-24 RX ORDER — ETOMIDATE 2 MG/ML
INJECTION INTRAVENOUS AS NEEDED
Status: DISCONTINUED | OUTPATIENT
Start: 2019-11-24 | End: 2019-11-24 | Stop reason: HOSPADM

## 2019-11-24 RX ORDER — NOREPINEPHRINE BITARTRATE/D5W 4MG/250ML
.5-16 PLASTIC BAG, INJECTION (ML) INTRAVENOUS
Status: DISCONTINUED | OUTPATIENT
Start: 2019-11-24 | End: 2019-11-26

## 2019-11-24 RX ORDER — HYDROMORPHONE HYDROCHLORIDE 1 MG/ML
0.5 INJECTION, SOLUTION INTRAMUSCULAR; INTRAVENOUS; SUBCUTANEOUS
Status: DISCONTINUED | OUTPATIENT
Start: 2019-11-24 | End: 2019-11-28 | Stop reason: HOSPADM

## 2019-11-24 RX ORDER — SODIUM CHLORIDE 0.9 % (FLUSH) 0.9 %
30 SYRINGE (ML) INJECTION AS NEEDED
Status: DISCONTINUED | OUTPATIENT
Start: 2019-11-24 | End: 2019-11-28 | Stop reason: HOSPADM

## 2019-11-24 RX ORDER — SODIUM CHLORIDE 0.9 % (FLUSH) 0.9 %
30 SYRINGE (ML) INJECTION EVERY 8 HOURS
Status: DISCONTINUED | OUTPATIENT
Start: 2019-11-24 | End: 2019-11-28 | Stop reason: HOSPADM

## 2019-11-24 RX ORDER — MORPHINE SULFATE 2 MG/ML
2 INJECTION, SOLUTION INTRAMUSCULAR; INTRAVENOUS
Status: DISCONTINUED | OUTPATIENT
Start: 2019-11-24 | End: 2019-11-24 | Stop reason: SDUPTHER

## 2019-11-24 RX ORDER — LIDOCAINE HYDROCHLORIDE 20 MG/ML
INJECTION, SOLUTION EPIDURAL; INFILTRATION; INTRACAUDAL; PERINEURAL AS NEEDED
Status: DISCONTINUED | OUTPATIENT
Start: 2019-11-24 | End: 2019-11-24 | Stop reason: HOSPADM

## 2019-11-24 RX ORDER — SUCCINYLCHOLINE CHLORIDE 20 MG/ML
INJECTION INTRAMUSCULAR; INTRAVENOUS AS NEEDED
Status: DISCONTINUED | OUTPATIENT
Start: 2019-11-24 | End: 2019-11-24 | Stop reason: HOSPADM

## 2019-11-24 RX ORDER — SODIUM CHLORIDE, SODIUM LACTATE, POTASSIUM CHLORIDE, CALCIUM CHLORIDE 600; 310; 30; 20 MG/100ML; MG/100ML; MG/100ML; MG/100ML
INJECTION, SOLUTION INTRAVENOUS
Status: DISCONTINUED | OUTPATIENT
Start: 2019-11-24 | End: 2019-11-24 | Stop reason: HOSPADM

## 2019-11-24 RX ORDER — DEXTROSE 50 % IN WATER (D50W) INTRAVENOUS SYRINGE
25-50 AS NEEDED
Status: DISCONTINUED | OUTPATIENT
Start: 2019-11-24 | End: 2019-11-28 | Stop reason: HOSPADM

## 2019-11-24 RX ADMIN — Medication 30 ML: at 21:16

## 2019-11-24 RX ADMIN — SODIUM CHLORIDE, SODIUM LACTATE, POTASSIUM CHLORIDE, AND CALCIUM CHLORIDE 150 ML/HR: 600; 310; 30; 20 INJECTION, SOLUTION INTRAVENOUS at 22:07

## 2019-11-24 RX ADMIN — INSULIN LISPRO 2 UNITS: 100 INJECTION, SOLUTION INTRAVENOUS; SUBCUTANEOUS at 22:51

## 2019-11-24 RX ADMIN — SODIUM CHLORIDE 125 ML/HR: 900 INJECTION, SOLUTION INTRAVENOUS at 03:26

## 2019-11-24 RX ADMIN — POTASSIUM CHLORIDE 20 MEQ: 200 INJECTION, SOLUTION INTRAVENOUS at 07:50

## 2019-11-24 RX ADMIN — Medication 4 MCG/MIN: at 01:16

## 2019-11-24 RX ADMIN — FAMOTIDINE 20 MG: 10 INJECTION INTRAVENOUS at 21:16

## 2019-11-24 RX ADMIN — FAMOTIDINE 20 MG: 10 INJECTION INTRAVENOUS at 10:20

## 2019-11-24 RX ADMIN — PIPERACILLIN AND TAZOBACTAM 3.38 G: 3; .375 INJECTION, POWDER, FOR SOLUTION INTRAVENOUS at 17:43

## 2019-11-24 RX ADMIN — SUCCINYLCHOLINE CHLORIDE 160 MG: 20 INJECTION, SOLUTION INTRAMUSCULAR; INTRAVENOUS at 02:42

## 2019-11-24 RX ADMIN — HEPARIN SODIUM (PORCINE) LOCK FLUSH IV SOLN 100 UNIT/ML 900 UNITS: 100 SOLUTION at 14:01

## 2019-11-24 RX ADMIN — INSULIN LISPRO 2 UNITS: 100 INJECTION, SOLUTION INTRAVENOUS; SUBCUTANEOUS at 07:42

## 2019-11-24 RX ADMIN — PHENYLEPHRINE HYDROCHLORIDE 100 MCG: 10 INJECTION INTRAVENOUS at 02:55

## 2019-11-24 RX ADMIN — INSULIN LISPRO 6 UNITS: 100 INJECTION, SOLUTION INTRAVENOUS; SUBCUTANEOUS at 11:17

## 2019-11-24 RX ADMIN — LIDOCAINE HYDROCHLORIDE 80 MG: 20 INJECTION, SOLUTION EPIDURAL; INFILTRATION; INTRACAUDAL; PERINEURAL at 02:42

## 2019-11-24 RX ADMIN — Medication 14 MCG/MIN: at 14:57

## 2019-11-24 RX ADMIN — ONDANSETRON 4 MG: 2 INJECTION INTRAMUSCULAR; INTRAVENOUS at 12:39

## 2019-11-24 RX ADMIN — Medication 16 MCG/MIN: at 10:29

## 2019-11-24 RX ADMIN — HYDROMORPHONE HYDROCHLORIDE 0.5 MG: 1 INJECTION, SOLUTION INTRAMUSCULAR; INTRAVENOUS; SUBCUTANEOUS at 07:37

## 2019-11-24 RX ADMIN — HYDROCODONE BITARTRATE AND ACETAMINOPHEN 1 TABLET: 5; 325 TABLET ORAL at 05:09

## 2019-11-24 RX ADMIN — SODIUM CHLORIDE 1000 ML: 900 INJECTION, SOLUTION INTRAVENOUS at 10:15

## 2019-11-24 RX ADMIN — SODIUM CHLORIDE: 900 INJECTION, SOLUTION INTRAVENOUS at 02:33

## 2019-11-24 RX ADMIN — NOREPINEPHRINE BITARTRATE 10 MCG/KG/MIN: 1 INJECTION, SOLUTION, CONCENTRATE INTRAVENOUS at 02:33

## 2019-11-24 RX ADMIN — HYDROMORPHONE HYDROCHLORIDE 0.5 MG: 1 INJECTION, SOLUTION INTRAMUSCULAR; INTRAVENOUS; SUBCUTANEOUS at 19:23

## 2019-11-24 RX ADMIN — SODIUM CHLORIDE 655 ML: 900 INJECTION, SOLUTION INTRAVENOUS at 01:58

## 2019-11-24 RX ADMIN — Medication 10 ML: at 05:12

## 2019-11-24 RX ADMIN — HEPARIN SODIUM (PORCINE) LOCK FLUSH IV SOLN 100 UNIT/ML 900 UNITS: 100 SOLUTION at 21:17

## 2019-11-24 RX ADMIN — SODIUM CHLORIDE 1000 ML: 900 INJECTION, SOLUTION INTRAVENOUS at 07:43

## 2019-11-24 RX ADMIN — Medication 30 ML: at 14:01

## 2019-11-24 RX ADMIN — ETOMIDATE 22 MG: 2 INJECTION, SOLUTION INTRAVENOUS at 02:42

## 2019-11-24 RX ADMIN — SODIUM CHLORIDE, SODIUM LACTATE, POTASSIUM CHLORIDE, AND CALCIUM CHLORIDE 150 ML/HR: 600; 310; 30; 20 INJECTION, SOLUTION INTRAVENOUS at 08:08

## 2019-11-24 RX ADMIN — Medication 16 MCG/MIN: at 06:05

## 2019-11-24 RX ADMIN — ALBUMIN (HUMAN) 25 G: 0.25 INJECTION, SOLUTION INTRAVENOUS at 10:20

## 2019-11-24 RX ADMIN — KETOROLAC TROMETHAMINE 15 MG: 15 INJECTION, SOLUTION INTRAMUSCULAR; INTRAVENOUS at 17:34

## 2019-11-24 RX ADMIN — Medication 10 ML: at 01:51

## 2019-11-24 RX ADMIN — SODIUM CHLORIDE, SODIUM LACTATE, POTASSIUM CHLORIDE, AND CALCIUM CHLORIDE 150 ML/HR: 600; 310; 30; 20 INJECTION, SOLUTION INTRAVENOUS at 15:34

## 2019-11-24 RX ADMIN — PIPERACILLIN AND TAZOBACTAM 3.38 G: 3; .375 INJECTION, POWDER, FOR SOLUTION INTRAVENOUS at 01:17

## 2019-11-24 RX ADMIN — HYDROCODONE BITARTRATE AND ACETAMINOPHEN 1 TABLET: 5; 325 TABLET ORAL at 21:15

## 2019-11-24 RX ADMIN — SODIUM CHLORIDE 1000 ML: 900 INJECTION, SOLUTION INTRAVENOUS at 01:14

## 2019-11-24 RX ADMIN — CEFTRIAXONE 1 G: 1 INJECTION, POWDER, FOR SOLUTION INTRAMUSCULAR; INTRAVENOUS at 00:09

## 2019-11-24 RX ADMIN — ONDANSETRON 4 MG: 2 INJECTION INTRAMUSCULAR; INTRAVENOUS at 04:54

## 2019-11-24 RX ADMIN — SODIUM CHLORIDE 1000 ML: 900 INJECTION, SOLUTION INTRAVENOUS at 00:33

## 2019-11-24 RX ADMIN — Medication 10 ML: at 21:16

## 2019-11-24 RX ADMIN — SODIUM CHLORIDE 1000 ML: 900 INJECTION, SOLUTION INTRAVENOUS at 04:55

## 2019-11-24 RX ADMIN — ACETAMINOPHEN 650 MG: 325 TABLET, FILM COATED ORAL at 19:23

## 2019-11-24 RX ADMIN — POTASSIUM CHLORIDE 20 MEQ: 200 INJECTION, SOLUTION INTRAVENOUS at 05:09

## 2019-11-24 RX ADMIN — PIPERACILLIN AND TAZOBACTAM 3.38 G: 3; .375 INJECTION, POWDER, FOR SOLUTION INTRAVENOUS at 10:16

## 2019-11-24 RX ADMIN — SODIUM CHLORIDE, SODIUM LACTATE, POTASSIUM CHLORIDE, AND CALCIUM CHLORIDE: 600; 310; 30; 20 INJECTION, SOLUTION INTRAVENOUS at 03:10

## 2019-11-24 RX ADMIN — INSULIN LISPRO 2 UNITS: 100 INJECTION, SOLUTION INTRAVENOUS; SUBCUTANEOUS at 16:29

## 2019-11-24 RX ADMIN — Medication 10 ML: at 14:01

## 2019-11-24 RX ADMIN — PHENYLEPHRINE HYDROCHLORIDE 100 MCG: 10 INJECTION INTRAVENOUS at 02:50

## 2019-11-24 NOTE — PROGRESS NOTES
available on-site from 4:30 p.m. - 1:00 a.m. Please call [place] at [phone] with any interpreting requests. Thank you,      Perla Swain@"[employer] c: [phone] / [address] [facility] [address] / [place], [address] [facility]  [email or web address]

## 2019-11-24 NOTE — PROGRESS NOTES
Bedside shift change report given to Breanna Betancur RN (oncoming nurse) by Krystina Case RN (offgoing nurse). Report included the following information SBAR, Kardex, ED Summary, Procedure Summary, Intake/Output, MAR, Accordion, Recent Results, Cardiac Rhythm ST, Alarm Parameters  and Quality Measures.

## 2019-11-24 NOTE — PROGRESS NOTES
TRANSFER - OUT REPORT:    Verbal report given to Priyanka(name) on [place]  being transferred to ICU(unit) for urgent transfer       Report consisted of patients Situation, Background, Assessment and   Recommendations(SBAR). Information from the following report(s) SBAR, ED Summary, Intake/Output, MAR, Recent Results and Med Rec Status was reviewed with the receiving nurse. Lines:   Peripheral IV 11/23/19 Left Antecubital (Active)   Site Assessment Clean, dry, & intact 11/23/2019  8:51 PM   Phlebitis Assessment 0 11/23/2019  8:51 PM   Infiltration Assessment 0 11/23/2019  8:51 PM   Dressing Status Clean, dry, & intact 11/23/2019  8:51 PM   Dressing Type Tape;Transparent 11/23/2019  8:51 PM   Hub Color/Line Status Pink 11/23/2019  8:51 PM   Alcohol Cap Used No 11/23/2019  8:51 PM        Opportunity for questions and clarification was provided.       Patient transported with:   Registered Nurse     Transferred on levophed with outreach nurse

## 2019-11-24 NOTE — PROGRESS NOTES
TRANSFER - IN REPORT:    Verbal report received from [address] [place] [address] (name) on [facility]  being received from OR (unit) for routine progression of care      Report consisted of patients Situation, Background, Assessment and   Recommendations(SBAR). Information from the following report(s) SBAR, Kardex, ED Summary, OR Summary, Procedure Summary, Intake/Output, MAR, Accordion, Recent Results, Med Rec Status, Cardiac Rhythm ST and Alarm Parameters  was reviewed with the receiving nurse. Opportunity for questions and clarification was provided. Assessment completed upon patients arrival to unit and care assumed.

## 2019-11-24 NOTE — PROGRESS NOTES
Hospitalist babita. Made aware of significant hypotension and septic work up. MD placing order to transfer. Pressure bag placed to start fluids even faster. No change to hypotension. Will continue to monitor.

## 2019-11-24 NOTE — PROGRESS NOTES
Progress Note    Patient: Taylor Barraza MRN: [id number]  SSN: [id number]    YOB: 1980  Age: 43 y.o. Sex: female      Admit Date: 11/23/2019    LOS: 1 day     Subjective: This is a 44 YO female patient with a PMH of DM type II. Admitted with sepsis secondary to UTI and obstructive uropathy with renal stones in the left ureter. The patient developed septic shock and had to be transferred to the ICU for pressor support and had a cystoscopy with stent insertion in the R ureter last night. Still hypotensive and requiring IV pressors. Blood culture reported gram negative rods. On IV zosyn. Will repeat blood cultures tomorrow. PICC line inserted today. Objective:     Vitals:    11/24/19 1645 11/24/19 1646 11/24/19 1700 11/24/19 1701   BP: 100/58 100/58  92/50   Pulse: (!) 105 (!) 109 (!) 108 (!) 109   Resp: 28 25 30 27   Temp:  98.8 °F (37.1 °C)     SpO2: 97% 98% 97% 97%   Weight:       Height:            Intake and Output:  Current Shift: 11/24 0701 - 11/24 1900  In: 5146.3 [P.O.:300; I.V.:4846.3]  Out: 1450 [Urine:1450]  Last three shifts: 11/22 1901 - 11/24 0700  In: 4903.8 [I.V.:4903.8]  Out: 325 [Urine:325]    Physical Exam:   GENERAL: alert, appears stated age  EYE: conjunctivae/corneas clear. LYMPHATIC: Cervical, supraclavicular, and axillary nodes normal.   THROAT & NECK: normal and no erythema or exudates noted. LUNG: clear to auscultation bilaterally  HEART: regular rate and rhythm, S1, S2 normal, no murmur, click, rub or gallop  ABDOMEN: soft, non-tender. Bowel sounds normal. No masses,  no organomegaly  EXTREMITIES:  extremities normal, atraumatic, no cyanosis or edema  SKIN: Normal.  NEUROLOGIC: no focal deficits. Lethargic. PSYCHIATRIC: non focal    Lab/Data Review: All lab results for the last 24 hours reviewed.          Assessment:     Principal Problem:    Septic shock ([address] (11/24/2019)    Active Problems:    Ureteral stone (11/23/2019) Overview:        Date of Procedure: 11/24/2019       Preoperative Diagnosis: Right Ureteral Stone      Postoperative Diagnosis: Right Ureteral Stone        Procedure(s):      CYSTOSCOPY URETERAL STENT INSERTION      RHONDA (acute kidney injury) ([facility] (11/24/2019)      Diabetes [facility] (11/24/2019)      Hydronephrosis due to obstruction of ureter (11/24/2019)        Plan:     -IV fluids  -IV pressors   -IV zosyn   -repeat blood cultures in the morning   -monitor VS  -monitor lactic acid  -IV pepcid     Signed By: Chalice Severe, MD     November 24, 2019

## 2019-11-24 NOTE — CONSULTS
Hospitalist H&P/Consult Note     Admit Date:  2019  4:23 PM   Name:  Bro Cantor   Age:  43 y.o.  :  1980   MRN:  [id number]   PCP:  Nhan Li MD  Treatment Team: Attending Provider: Earl Alcantara MD; Consulting Provider: Fe Mohamud MD    HPI:   Patient is a 42 y/o  female with hx diabetes admitted for right ureterolithiasis, hydronephrosis. Shortly after arrival to floor patient became tachycardic and severely hypotensive and distressed. Nursing called Hospitalist for stat consult for concern of septic shock. Patient was seen within 10 minutes of call. Patient's urine not very remarkable but suspect she has infection above the obstruction. Patient has no prior hx kidney stones. Orders for additional fluid boluses and to start Levophed gtt to get MAP above 65. Antibiotics broadened to Zosyn. Patient's lactic acid is 6.3 with a procalcitonin of 15.2. Transferring her urgently to ICU for septic shock. Her condition is critical.     10 systems reviewed and negative except as noted in HPI. Past Medical History:   Diagnosis Date    Diabetes [facility])       Past Surgical History:   Procedure Laterality Date    HX DILATION AND CURETTAGE        Prior to Admission Medications   Prescriptions Last Dose Informant Patient Reported? Taking? SITagliptin (JANUVIA) 100 mg tablet 2019 at Unknown time  Yes Yes   Sig: Take 100 mg by mouth daily. diclofenac EC (VOLTAREN) 75 mg EC tablet 2019 at Unknown time  No Yes   Sig: Take 1 Tab by mouth two (2) times a day. Facility-Administered Medications: None     No Known Allergies   Social History     Tobacco Use    Smoking status: Never Smoker   Substance Use Topics    Alcohol use: Not on file      No family history on file.    Immunization History   Administered Date(s) Administered    Tdap 2018       Objective:     Patient Vitals for the past 24 hrs:   Temp Pulse Resp BP SpO2   19 0058    (!) 60/33    11/24/19 0054 99.5 °F (37.5 °C)       11/24/19 0052  (!) 102  (!) 67/42 97 %   11/24/19 0044  (!) 108  (!) 70/33 96 %   11/24/19 0035    (!) 69/31    11/24/19 0033  (!) 107  (!) 66/36 96 %   11/24/19 0003 (!) 101.3 °F (38.5 °C) (!) 116 20 (!) 68/42 100 %   11/23/19 2250 (!) 103.8 °F (39.9 °C) (!) 131 22 91/45 97 %   11/23/19 2128 (!) 100.6 °F (38.1 °C) (!) 133 22 105/58 100 %   11/23/19 2051 98.7 °F (37.1 °C) (!) 130 20 116/59 100 %   11/23/19 1931 98.6 °F (37 °C) 90 18 145/79 98 %   11/23/19 1920    146/79    11/23/19 1634  89  132/80 100 %   11/23/19 1527 98.6 °F (37 °C) 87 22 147/88 100 %     Oxygen Therapy  O2 Sat (%): 97 % (11/24/19 0052)  Pulse via Oximetry: 89 beats per minute (11/23/19 1634)  O2 Device: Room air (11/23/19 1527)    Intake/Output Summary (Last 24 hours) at 11/24/2019 0120  Last data filed at 11/23/2019 1823  Gross per 24 hour   Intake 100 ml   Output    Net 100 ml       Physical Exam:  General:    Well nourished. Lethargic, diaphoretic, distressed   Eyes:   Normal sclera. Extraocular movements intact. ENT:  Normocephalic, atraumatic. Moist mucous membranes  CV:   tachycardic. No murmur, rub, or gallop. Severe hypotension   Lungs:  CTAB. No wheezing, rhonchi, or rales. Abdomen: Soft, tender RLQ  Extremities: Warm and dry. No cyanosis or edema. Neurologic: CN II-XII grossly intact. Sensation intact. Skin:     No rashes or jaundice. No wounds. Psych:  She is in distress    I reviewed the labs, imaging, EKGs, telemetry, and other studies done this admission.   Data Review:   Recent Results (from the past 24 hour(s))   CBC WITH AUTOMATED DIFF    Collection Time: 11/23/19  3:36 PM   Result Value Ref Range    WBC 13.6 (H) 4.3 - 11.1 K/uL    RBC 4.73 4.05 - 5.2 M/uL    HGB 14.3 11.7 - 15.4 g/dL    HCT 43.4 35.8 - 46.3 %    MCV 91.8 79.6 - 97.8 FL    MCH 30.2 26.1 - 32.9 PG    MCHC 32.9 31.4 - 35.0 g/dL    RDW 12.1 11.9 - 14.6 %    PLATELET 819 839 - 281 K/uL    MPV 9.6 9.4 - 12.3 FL    ABSOLUTE NRBC 0.00 0.0 - 0.2 K/uL    DF AUTOMATED      NEUTROPHILS 88 (H) 43 - 78 %    LYMPHOCYTES 7 (L) 13 - 44 %    MONOCYTES 5 4.0 - 12.0 %    EOSINOPHILS 0 (L) 0.5 - 7.8 %    BASOPHILS 0 0.0 - 2.0 %    IMMATURE GRANULOCYTES 0 0.0 - 5.0 %    ABS. NEUTROPHILS 11.9 (H) 1.7 - 8.2 K/UL    ABS. LYMPHOCYTES 1.0 0.5 - 4.6 K/UL    ABS. MONOCYTES 0.6 0.1 - 1.3 K/UL    ABS. EOSINOPHILS 0.0 0.0 - 0.8 K/UL    ABS. BASOPHILS 0.0 0.0 - 0.2 K/UL    ABS. IMM. GRANS. 0.0 0.0 - 0.5 K/UL   METABOLIC PANEL, COMPREHENSIVE    Collection Time: 11/23/19  3:36 PM   Result Value Ref Range    Sodium 139 136 - 145 mmol/L    Potassium 3.7 3.5 - 5.1 mmol/L    Chloride 103 98 - 107 mmol/L    CO2 27 21 - 32 mmol/L    Anion gap 9 7 - 16 mmol/L    Glucose 220 (H) 65 - 100 mg/dL    BUN 15 6 - 23 MG/DL    Creatinine 1.09 (H) 0.6 - 1.0 MG/DL    GFR est AA >60 >60 ml/min/1.73m2    GFR est non-AA 59 (L) >60 ml/min/1.73m2    Calcium 9.1 8.3 - 10.4 MG/DL    Bilirubin, total 0.4 0.2 - 1.1 MG/DL    ALT (SGPT) 65 12 - 65 U/L    AST (SGOT) 35 15 - 37 U/L    Alk.  phosphatase 71 50 - 136 U/L    Protein, total 8.3 (H) 6.3 - 8.2 g/dL    Albumin 3.7 3.5 - 5.0 g/dL    Globulin 4.6 (H) 2.3 - 3.5 g/dL    A-G Ratio 0.8 (L) 1.2 - 3.5     HCG URINE, QL. - POC    Collection Time: 11/23/19  4:38 PM   Result Value Ref Range    Pregnancy test,urine (POC) NEGATIVE  NEG     URINE MICROSCOPIC    Collection Time: 11/23/19  4:41 PM   Result Value Ref Range    WBC 3-5 0 /hpf    RBC 0-3 0 /hpf    Epithelial cells 3-5 0 /hpf    Bacteria TRACE 0 /hpf    Casts 0 0 /lpf   GLUCOSE, POC    Collection Time: 11/23/19  8:56 PM   Result Value Ref Range    Glucose (POC) 142 (H) 65 - 100 mg/dL   PROCALCITONIN    Collection Time: 11/23/19 10:40 PM   Result Value Ref Range    Procalcitonin 33.3 ng/mL   METABOLIC PANEL, COMPREHENSIVE    Collection Time: 11/23/19 10:40 PM   Result Value Ref Range    Sodium 138 136 - 145 mmol/L    Potassium 3.5 3.5 - 5.1 mmol/L Chloride 103 98 - 107 mmol/L    CO2 22 21 - 32 mmol/L    Anion gap 13 7 - 16 mmol/L    Glucose 208 (H) 65 - 100 mg/dL    BUN 15 6 - 23 MG/DL    Creatinine 1.58 (H) 0.6 - 1.0 MG/DL    GFR est AA 47 (L) >60 ml/min/1.73m2    GFR est non-AA 39 (L) >60 ml/min/1.73m2    Calcium 8.3 8.3 - 10.4 MG/DL    Bilirubin, total 0.6 0.2 - 1.1 MG/DL    ALT (SGPT) 49 12 - 65 U/L    AST (SGOT) 29 15 - 37 U/L    Alk. phosphatase 80 50 - 136 U/L    Protein, total 6.8 6.3 - 8.2 g/dL    Albumin 3.0 (L) 3.5 - 5.0 g/dL    Globulin 3.8 (H) 2.3 - 3.5 g/dL    A-G Ratio 0.8 (L) 1.2 - 3.5     CBC WITH AUTOMATED DIFF    Collection Time: 11/23/19 10:40 PM   Result Value Ref Range    WBC 3.3 (L) 4.3 - 11.1 K/uL    RBC 4.25 4.05 - 5.2 M/uL    HGB 13.1 11.7 - 15.4 g/dL    HCT 39.2 35.8 - 46.3 %    MCV 92.2 79.6 - 97.8 FL    MCH 30.8 26.1 - 32.9 PG    MCHC 33.4 31.4 - 35.0 g/dL    RDW 12.3 11.9 - 14.6 %    PLATELET 404 (L) 552 - 450 K/uL    MPV 9.2 (L) 9.4 - 12.3 FL    ABSOLUTE NRBC 0.00 0.0 - 0.2 K/uL    NEUTROPHILS 53 43 - 78 %    LYMPHOCYTES 6 (L) 13 - 44 %    MONOCYTES 0 (L) 4.0 - 12.0 %    EOSINOPHILS 39 (H) 0.5 - 7.8 %    BASOPHILS 0 0.0 - 2.0 %    IMMATURE GRANULOCYTES 2 0.0 - 5.0 %    ABS. NEUTROPHILS 1.7 1.7 - 8.2 K/UL    ABS. LYMPHOCYTES 0.2 (L) 0.5 - 4.6 K/UL    ABS. MONOCYTES 0.0 (L) 0.1 - 1.3 K/UL    ABS. EOSINOPHILS 1.3 (H) 0.0 - 0.8 K/UL    ABS. BASOPHILS 0.0 0.0 - 0.2 K/UL    ABS. IMM.  GRANS. 0.1 0.0 - 0.5 K/UL    RBC COMMENTS NORMOCYTIC/NORMOCHROMIC      WBC COMMENTS Result Confirmed By Smear      PLATELET COMMENTS SLIGHT      DF AUTOMATED     LACTIC ACID    Collection Time: 11/23/19 10:40 PM   Result Value Ref Range    Lactic acid 6.3 (HH) 0.4 - 2.0 MMOL/L   CBC WITH AUTOMATED DIFF    Collection Time: 11/23/19 11:38 PM   Result Value Ref Range    WBC 4.6 4.3 - 11.1 K/uL    RBC 4.26 4.05 - 5.2 M/uL    HGB 13.0 11.7 - 15.4 g/dL    HCT 39.3 35.8 - 46.3 %    MCV 92.3 79.6 - 97.8 FL    MCH 30.5 26.1 - 32.9 PG    MCHC 33.1 31.4 - 35.0 g/dL    RDW 12.4 11.9 - 14.6 %    PLATELET 670 (L) 082 - 450 K/uL    MPV 9.4 9.4 - 12.3 FL    ABSOLUTE NRBC 0.00 0.0 - 0.2 K/uL    NEUTROPHILS 94 (H) 43 - 78 %    LYMPHOCYTES 5 (L) 13 - 44 %    MONOCYTES 1 (L) 4.0 - 12.0 %    EOSINOPHILS 0 (L) 0.5 - 7.8 %    BASOPHILS 0 0.0 - 2.0 %    IMMATURE GRANULOCYTES 0 0.0 - 5.0 %    ABS. NEUTROPHILS 4.4 1.7 - 8.2 K/UL    ABS. LYMPHOCYTES 0.2 (L) 0.5 - 4.6 K/UL    ABS. MONOCYTES 0.0 (L) 0.1 - 1.3 K/UL    ABS. EOSINOPHILS 0.0 0.0 - 0.8 K/UL    ABS. BASOPHILS 0.0 0.0 - 0.2 K/UL    ABS. IMM. GRANS. 0.0 0.0 - 0.5 K/UL    DF AUTOMATED         Imaging Studies:  CXR Results  (Last 48 hours)               11/23/19 2203  XR CHEST PORT Final result    Impression:  IMPRESSION: No acute process. Narrative:  EXAM: Chest x-ray. INDICATION: Sepsis. COMPARISON: None. TECHNIQUE: Single frontal view. FINDINGS: The lungs are clear, allowing for low lung volumes. The cardiac size,   mediastinal contour and pulmonary vasculature are normal. No pneumothorax or   pleural effusion is seen. CT Results  (Last 48 hours)               11/23/19 1822  CT ABD PELV W CONT Final result    Impression:  IMPRESSION:        1. Acute mild right hydronephrosis caused by 2 small partially obstructing   calculi in the distal right ureter just proximal to the UVJ. 2.  Other chronic findings as above. Narrative:  CT ABDOMEN AND PELVIS WITH CONTRAST       HISTORY: abd pain, 38 years Female       COMPARISON: CT abdomen November 05, 2017       TECHNIQUE: Oral contrast was not administered. 100 cc of nonionic intravenous   contrast was injected, and axial helical CT images were obtained from above the   diaphragm through the pelvis. Coronal reformatted images were obtained at the   scanner console and made available for review.   All CT scans at this location   are performed using dose modulation techniques as appropriate to a performed   exam including the following: automated exposure control; adjustment of the mA   and/or kV according to patient's size (this includes techniques or standardized   protocols for targeted exams where dose is matched to indication/reason for   exam; i.e. extremities or head); use of iterative reconstruction technique. FINDINGS:       ABDOMEN:   Minimal dependent subsegmental atelectasis bilateral lung bases. Normal-appearing liver, gallbladder, spleen, pancreas, bilateral adrenal glands   and left kidney. Mild right hydronephrosis and hydroureter, cause by a small   partially obstructing calculus in the distal right ureter just proximal to the   ureterovesical junction measuring 5 x 6 mm. There may be a second partially   obstructing calculus measuring 2 mm in diameter in the distal right ureter just   superior to this larger calculus. Mild calcific atherosclerosis of a normal   caliber abdominal aorta. No evidence of significant lymphadenopathy. Normal-appearing small bowel. No evidence of intraperitoneal free air or free   fluid. Image quality somewhat degraded by respiratory motion artifact. PELVIS:   Normal-appearing urinary bladder. Normal-appearing uterus and bilateral adnexa. Normal-appearing colon and appendix. No evidence of pelvic free fluid. No   evidence of significant inguinal or pelvic sidewall lymphadenopathy. Visualized osseous structures unremarkable.                  Assessment and Plan:     Hospital Problems as of 11/24/2019 Never Reviewed          Codes Class Noted - Resolved POA    * (Principal) [facility] ([facility] ICD-10-CM: A41.9, R65.21  ICD-9-CM: 038.9, 785.52, 995.92  11/24/2019 - Present Unknown        Ureteral stone ICD-10-CM: N20.1  ICD-9-CM: 592.1  11/23/2019 - Present Unknown        Hydronephrosis due to obstruction of ureter ICD-10-CM: N13.2  ICD-9-CM: 700, 593.4  11/24/2019 - Present Yes        RHONDA (acute kidney injury) ([facility] ICD-10-CM: N17.9  ICD-9-CM: 584.9  11/24/2019 - Present Yes        Diabetes ([facility] ICD-10-CM: E11.9  ICD-9-CM: 250.00  11/24/2019 - Present Yes              PLAN:  · Patient seen urgently on floor  · She is going into septic shock  · Transfer to ICU, bedrest, NPO, ins and outs  · Continue fluid boluses per sepsis protocol  · Start Levophed gtt to maintain MAP 65 or greater  · Broaden antibiotic coverage to high-dose Zosyn  · Her UA is not very remarkable but I suspect she has infection above her ureteral obstruction on the right. She likely needs to go to OR tonight for a cystoscopy/stent  · Trend lactic acids Q 4 hours  · Will control pain. May use IV narcotics once BP better  · Sliding scale insulin coverage for diabetes  · SCDs for DVT prophylaxis  · Patient is in Critical Condition with imminent threat for morbidity and mortality without immediate interventions.  Total critical care time spent 45 minutes      Estimated LOS:  Greater than 2 midnights    Signed:  Azalea Lai MD

## 2019-11-24 NOTE — ANESTHESIA POSTPROCEDURE EVALUATION
Procedure(s):  CYSTOSCOPY URETERAL STENT INSERTION. general    Anesthesia Post Evaluation      Multimodal analgesia: multimodal analgesia used between 6 hours prior to anesthesia start to PACU discharge  Patient location during evaluation: ICU  Patient participation: complete - patient participated  Level of consciousness: sleepy but conscious and responsive to verbal stimuli  Pain management: adequate  Airway patency: patent  Anesthetic complications: no  Cardiovascular status: stable and acceptable  Respiratory status: acceptable, spontaneous ventilation and nonlabored ventilation  Hydration status: hypovolemic  Post anesthesia nausea and vomiting:  none      Vitals Value Taken Time   BP 95/52 11/24/2019  3:24 AM   Temp 37.9 °C (100.2 °F) 11/24/2019  3:24 AM   Pulse 105 11/24/2019  3:27 AM   Resp 26 11/24/2019  3:27 AM   SpO2 96 % 11/24/2019  3:27 AM   Vitals shown include unvalidated device data.

## 2019-11-24 NOTE — PROGRESS NOTES
Subjective:   Daily Progress Note: 2019 1:07 PM  No Complaints. Objective:     Visit Vitals  BP 94/50   Pulse (!) 109   Temp 98 °F (36.7 °C)   Resp 27   Ht 5' 6\" (1.676 m)   Wt 188 lb 7.9 oz (85.5 kg)   SpO2 96%   BMI 30.42 kg/m²    O2 Flow Rate (L/min): 2 l/min O2 Device: Nasal cannula    Temp (24hrs), Av.6 °F (37.6 °C), Min:98 °F (36.7 °C), Max:103.8 °F (39.9 °C)       190 -  0700  In: 4903.8 [I.V.:4903.8]  Out: 325 [Urine:325]   07 -  190  In: 0335 [I.V.:2833]  Out: 600 [Urine:600]    [unfilled]  [unfilled]  [unfilled]    Exam: urine clear in haney tubing. Data Review    Recent Results (from the past 24 hour(s))   CBC WITH AUTOMATED DIFF    Collection Time: 19  3:36 PM   Result Value Ref Range    WBC 13.6 (H) 4.3 - 11.1 K/uL    RBC 4.73 4.05 - 5.2 M/uL    HGB 14.3 11.7 - 15.4 g/dL    HCT 43.4 35.8 - 46.3 %    MCV 91.8 79.6 - 97.8 FL    MCH 30.2 26.1 - 32.9 PG    MCHC 32.9 31.4 - 35.0 g/dL    RDW 12.1 11.9 - 14.6 %    PLATELET 412 609 - 152 K/uL    MPV 9.6 9.4 - 12.3 FL    ABSOLUTE NRBC 0.00 0.0 - 0.2 K/uL    DF AUTOMATED      NEUTROPHILS 88 (H) 43 - 78 %    LYMPHOCYTES 7 (L) 13 - 44 %    MONOCYTES 5 4.0 - 12.0 %    EOSINOPHILS 0 (L) 0.5 - 7.8 %    BASOPHILS 0 0.0 - 2.0 %    IMMATURE GRANULOCYTES 0 0.0 - 5.0 %    ABS. NEUTROPHILS 11.9 (H) 1.7 - 8.2 K/UL    ABS. LYMPHOCYTES 1.0 0.5 - 4.6 K/UL    ABS. MONOCYTES 0.6 0.1 - 1.3 K/UL    ABS. EOSINOPHILS 0.0 0.0 - 0.8 K/UL    ABS. BASOPHILS 0.0 0.0 - 0.2 K/UL    ABS. IMM.  GRANS. 0.0 0.0 - 0.5 K/UL   METABOLIC PANEL, COMPREHENSIVE    Collection Time: 19  3:36 PM   Result Value Ref Range    Sodium 139 136 - 145 mmol/L    Potassium 3.7 3.5 - 5.1 mmol/L    Chloride 103 98 - 107 mmol/L    CO2 27 21 - 32 mmol/L    Anion gap 9 7 - 16 mmol/L    Glucose 220 (H) 65 - 100 mg/dL    BUN 15 6 - 23 MG/DL    Creatinine 1.09 (H) 0.6 - 1.0 MG/DL    GFR est AA >60 >60 ml/min/1.73m2    GFR est non-AA 59 (L) >60 ml/min/1.73m2    Calcium 9.1 8.3 - 10.4 MG/DL    Bilirubin, total 0.4 0.2 - 1.1 MG/DL    ALT (SGPT) 65 12 - 65 U/L    AST (SGOT) 35 15 - 37 U/L    Alk. phosphatase 71 50 - 136 U/L    Protein, total 8.3 (H) 6.3 - 8.2 g/dL    Albumin 3.7 3.5 - 5.0 g/dL    Globulin 4.6 (H) 2.3 - 3.5 g/dL    A-G Ratio 0.8 (L) 1.2 - 3.5     HCG URINE, QL. - POC    Collection Time: 11/23/19  4:38 PM   Result Value Ref Range    Pregnancy test,urine (POC) NEGATIVE  NEG     URINE MICROSCOPIC    Collection Time: 11/23/19  4:41 PM   Result Value Ref Range    WBC 3-5 0 /hpf    RBC 0-3 0 /hpf    Epithelial cells 3-5 0 /hpf    Bacteria TRACE 0 /hpf    Casts 0 0 /lpf   GLUCOSE, POC    Collection Time: 11/23/19  8:56 PM   Result Value Ref Range    Glucose (POC) 142 (H) 65 - 100 mg/dL   EKG, 12 LEAD, INITIAL    Collection Time: 11/23/19  9:48 PM   Result Value Ref Range    Ventricular Rate 137 BPM    Atrial Rate 137 BPM    P-R Interval 120 ms    QRS Duration 74 ms    Q-T Interval 366 ms    QTC Calculation (Bezet) 552 ms    Calculated R Axis 22 degrees    Calculated T Axis 55 degrees    Diagnosis       Sinus tachycardia  Septal infarct , age undetermined  Abnormal ECG  When compared with ECG of 05-NOV-2017 23:40,  Septal infarct is now Present  Nonspecific T wave abnormality no longer evident in Inferior leads  Nonspecific T wave abnormality, improved in Anterolateral leads  Confirmed by Harshil Das ([id number]) on 11/24/2019 6:18:00 AM     CULTURE, BLOOD    Collection Time: 11/23/19 10:40 PM   Result Value Ref Range    Special Requests: LEFT  Antecubital        GRAM STAIN GRAM NEGATIVE RODS      GRAM STAIN PEDIATRIC BOTTLE      GRAM STAIN        RESULTS VERIFIED, PHONED TO AND READ BACK BY Ranjeet Naylor RN @ 0459 ON 11/24/19 AK.     Culture result: CULTURE IN PROGRESS,FURTHER UPDATES TO FOLLOW     PROCALCITONIN    Collection Time: 11/23/19 10:40 PM   Result Value Ref Range    Procalcitonin 15.2 ng/mL   CULTURE, BLOOD    Collection Time: 11/23/19 10:40 PM   Result Value Ref Range    Special Requests: LEFT  HAND        GRAM STAIN GRAM NEGATIVE RODS      GRAM STAIN PEDIATRIC BOTTLE      GRAM STAIN        RESULTS VERIFIED, PHONED TO AND READ BACK BY Shiela Heck RN @ 5826 ON 11/24/19 AK. Culture result: CULTURE IN PROGRESS,FURTHER UPDATES TO FOLLOW     METABOLIC PANEL, COMPREHENSIVE    Collection Time: 11/23/19 10:40 PM   Result Value Ref Range    Sodium 138 136 - 145 mmol/L    Potassium 3.5 3.5 - 5.1 mmol/L    Chloride 103 98 - 107 mmol/L    CO2 22 21 - 32 mmol/L    Anion gap 13 7 - 16 mmol/L    Glucose 208 (H) 65 - 100 mg/dL    BUN 15 6 - 23 MG/DL    Creatinine 1.58 (H) 0.6 - 1.0 MG/DL    GFR est AA 47 (L) >60 ml/min/1.73m2    GFR est non-AA 39 (L) >60 ml/min/1.73m2    Calcium 8.3 8.3 - 10.4 MG/DL    Bilirubin, total 0.6 0.2 - 1.1 MG/DL    ALT (SGPT) 49 12 - 65 U/L    AST (SGOT) 29 15 - 37 U/L    Alk. phosphatase 80 50 - 136 U/L    Protein, total 6.8 6.3 - 8.2 g/dL    Albumin 3.0 (L) 3.5 - 5.0 g/dL    Globulin 3.8 (H) 2.3 - 3.5 g/dL    A-G Ratio 0.8 (L) 1.2 - 3.5     CBC WITH AUTOMATED DIFF    Collection Time: 11/23/19 10:40 PM   Result Value Ref Range    WBC 3.3 (L) 4.3 - 11.1 K/uL    RBC 4.25 4.05 - 5.2 M/uL    HGB 13.1 11.7 - 15.4 g/dL    HCT 39.2 35.8 - 46.3 %    MCV 92.2 79.6 - 97.8 FL    MCH 30.8 26.1 - 32.9 PG    MCHC 33.4 31.4 - 35.0 g/dL    RDW 12.3 11.9 - 14.6 %    PLATELET 207 (L) 642 - 450 K/uL    MPV 9.2 (L) 9.4 - 12.3 FL    ABSOLUTE NRBC 0.00 0.0 - 0.2 K/uL    NEUTROPHILS 53 43 - 78 %    LYMPHOCYTES 6 (L) 13 - 44 %    MONOCYTES 0 (L) 4.0 - 12.0 %    EOSINOPHILS 39 (H) 0.5 - 7.8 %    BASOPHILS 0 0.0 - 2.0 %    IMMATURE GRANULOCYTES 2 0.0 - 5.0 %    ABS. NEUTROPHILS 1.7 1.7 - 8.2 K/UL    ABS. LYMPHOCYTES 0.2 (L) 0.5 - 4.6 K/UL    ABS. MONOCYTES 0.0 (L) 0.1 - 1.3 K/UL    ABS. EOSINOPHILS 1.3 (H) 0.0 - 0.8 K/UL    ABS. BASOPHILS 0.0 0.0 - 0.2 K/UL    ABS. IMM.  GRANS. 0.1 0.0 - 0.5 K/UL    RBC COMMENTS NORMOCYTIC/NORMOCHROMIC      WBC COMMENTS Result Confirmed By Smear PLATELET COMMENTS SLIGHT      DF AUTOMATED     LACTIC ACID    Collection Time: 11/23/19 10:40 PM   Result Value Ref Range    Lactic acid 6.3 (HH) 0.4 - 2.0 MMOL/L   CBC WITH AUTOMATED DIFF    Collection Time: 11/23/19 11:38 PM   Result Value Ref Range    WBC 4.6 4.3 - 11.1 K/uL    RBC 4.26 4.05 - 5.2 M/uL    HGB 13.0 11.7 - 15.4 g/dL    HCT 39.3 35.8 - 46.3 %    MCV 92.3 79.6 - 97.8 FL    MCH 30.5 26.1 - 32.9 PG    MCHC 33.1 31.4 - 35.0 g/dL    RDW 12.4 11.9 - 14.6 %    PLATELET 459 (L) 804 - 450 K/uL    MPV 9.4 9.4 - 12.3 FL    ABSOLUTE NRBC 0.00 0.0 - 0.2 K/uL    NEUTROPHILS 94 (H) 43 - 78 %    LYMPHOCYTES 5 (L) 13 - 44 %    MONOCYTES 1 (L) 4.0 - 12.0 %    EOSINOPHILS 0 (L) 0.5 - 7.8 %    BASOPHILS 0 0.0 - 2.0 %    IMMATURE GRANULOCYTES 0 0.0 - 5.0 %    ABS. NEUTROPHILS 4.4 1.7 - 8.2 K/UL    ABS. LYMPHOCYTES 0.2 (L) 0.5 - 4.6 K/UL    ABS. MONOCYTES 0.0 (L) 0.1 - 1.3 K/UL    ABS. EOSINOPHILS 0.0 0.0 - 0.8 K/UL    ABS. BASOPHILS 0.0 0.0 - 0.2 K/UL    ABS. IMM. GRANS. 0.0 0.0 - 0.5 K/UL    DF AUTOMATED     CULTURE, URINE    Collection Time: 11/24/19  3:02 AM   Result Value Ref Range    Special Requests: NO SPECIAL REQUESTS      Culture result:        NO GROWTH AFTER SHORT PERIOD OF INCUBATION. FURTHER RESULTS TO FOLLOW AFTER OVERNIGHT INCUBATION.    LACTIC ACID    Collection Time: 11/24/19  3:36 AM   Result Value Ref Range    Lactic acid 6.7 (HH) 0.4 - 2.0 MMOL/L   HEMOGLOBIN A1C WITH EAG    Collection Time: 11/24/19  3:36 AM   Result Value Ref Range    Hemoglobin A1c 8.5 (H) 4.8 - 6.0 %    Est. average glucose 981 mg/dL   METABOLIC PANEL, BASIC    Collection Time: 11/24/19  3:36 AM   Result Value Ref Range    Sodium 142 136 - 145 mmol/L    Potassium 3.3 (L) 3.5 - 5.1 mmol/L    Chloride 110 (H) 98 - 107 mmol/L    CO2 20 (L) 21 - 32 mmol/L    Anion gap 12 7 - 16 mmol/L    Glucose 235 (H) 65 - 100 mg/dL    BUN 18 6 - 23 MG/DL    Creatinine 1.59 (H) 0.6 - 1.0 MG/DL    GFR est AA 46 (L) >60 ml/min/1.73m2    GFR est non-AA 38 (L) >60 ml/min/1.73m2    Calcium 7.0 (L) 8.3 - 34.3 MG/DL   METABOLIC PANEL, COMPREHENSIVE    Collection Time: 11/24/19  6:56 AM   Result Value Ref Range    Sodium 143 136 - 145 mmol/L    Potassium 3.3 (L) 3.5 - 5.1 mmol/L    Chloride 113 (H) 98 - 107 mmol/L    CO2 19 (L) 21 - 32 mmol/L    Anion gap 11 7 - 16 mmol/L    Glucose 213 (H) 65 - 100 mg/dL    BUN 16 6 - 23 MG/DL    Creatinine 1.43 (H) 0.6 - 1.0 MG/DL    GFR est AA 53 (L) >60 ml/min/1.73m2    GFR est non-AA 43 (L) >60 ml/min/1.73m2    Calcium 6.5 (L) 8.3 - 10.4 MG/DL    Bilirubin, total 0.7 0.2 - 1.1 MG/DL    ALT (SGPT) 35 12 - 65 U/L    AST (SGOT) 23 15 - 37 U/L    Alk.  phosphatase 44 (L) 50 - 136 U/L    Protein, total 5.0 (L) 6.3 - 8.2 g/dL    Albumin 2.1 (L) 3.5 - 5.0 g/dL    Globulin 2.9 2.3 - 3.5 g/dL    A-G Ratio 0.7 (L) 1.2 - 3.5     LACTIC ACID    Collection Time: 11/24/19  6:57 AM   Result Value Ref Range    Lactic acid 6.0 (HH) 0.4 - 2.0 MMOL/L   GLUCOSE, POC    Collection Time: 11/24/19  7:15 AM   Result Value Ref Range    Glucose (POC) 196 (H) 65 - 492 mg/dL   METABOLIC PANEL, BASIC    Collection Time: 11/24/19  9:45 AM   Result Value Ref Range    Sodium 142 136 - 145 mmol/L    Potassium 3.7 3.5 - 5.1 mmol/L    Chloride 113 (H) 98 - 107 mmol/L    CO2 20 (L) 21 - 32 mmol/L    Anion gap 9 7 - 16 mmol/L    Glucose 225 (H) 65 - 100 mg/dL    BUN 16 6 - 23 MG/DL    Creatinine 1.19 (H) 0.6 - 1.0 MG/DL    GFR est AA >60 >60 ml/min/1.73m2    GFR est non-AA 54 (L) >60 ml/min/1.73m2    Calcium 6.5 (L) 8.3 - 10.4 MG/DL   LACTIC ACID    Collection Time: 11/24/19  9:45 AM   Result Value Ref Range    Lactic acid 4.8 (HH) 0.4 - 2.0 MMOL/L   GLUCOSE, POC    Collection Time: 11/24/19 11:16 AM   Result Value Ref Range    Glucose (POC) 259 (H) 65 - 100 mg/dL       Assessment   Principal Problem:    Septic shock ([address] (11/24/2019)    Active Problems:    Ureteral stone (11/23/2019)      Overview:        Date of Procedure: 11/24/2019       Preoperative Diagnosis: Right Ureteral Stone      Postoperative Diagnosis: Right Ureteral Stone        Procedure(s):      CYSTOSCOPY URETERAL STENT INSERTION      RHONDA (acute kidney injury) ([facility] [address] (11/24/2019)      Diabetes ([facility] [address] (11/24/2019)      Hydronephrosis due to obstruction of ureter (11/24/2019)        Plan:  Gram negative sepsis, right ureteral stone s/p stent. Hypotensive, on Levophed. Continue ICU status.

## 2019-11-24 NOTE — H&P
[address] [place] [address]  HISTORY AND PHYSICAL    Name:  Leda Lowry  MR#:  [id number]  :  1980  ACCOUNT #:  [de-identified]  ADMIT DATE:  2019    CHIEF COMPLAINT:  Right lower quadrant pain. HISTORY OF PRESENT ILLNESS:  A 79-year-old female came to the ER with acute onset of right lower quadrant pain radiating to the back. The patient only speaks [place] They communicated with an  by phone line around 07:00 p.m. today and the patient stated that she was having some vomiting since earlier today. Her urinalysis shows 3 to 5 epithelials, 3 to 5 wbc, 0 to 3 rbc, trace bacteria. She had a CT of abdomen and pelvis with contrast which shows two separate stones in the right distal ureter with mild right-sided hydronephrosis. There is a 6-mm right distal ureteral stone and a 2-mm stone just proximal to that on the right. Lab work in the ER shows a white count 13. 6. Chemistry showed potassium of 3.7, creatinine 1.09. The patient was admitted, started on IV fluids, and given tamsulosin and they will see if she could pass the stone. She was going to be admitted for pain control because she continued to have pain in the ER despite narcotics. After reaching the floor, she had complained by phone milo that she was having some shortness of breath. She was also noted to be tachycardic with her pulse rate approximately 130. Sepsis labs were obtained and remarkable for an elevated lactic acid of 6.3. Cultures were taken. Shortly after that, she became febrile with temperature going up to 103.8 at 22:50. She was started on IV Rocephin and we gave her a fluid bolus. She became hypotensive with blood pressure going down to 60/33. Hospitalist is seeing the patient. She has been started on Levophed drip. She is going to be transferred to the intensive care unit and started on Zosyn. I am able to speak to the patient. She appears to be alert.   She has a palpable See telephone call from 4/27/17   pulse which is fairly strong at this point. PAST MEDICAL HISTORY:  Includes diabetes. PAST SURGERY:  History of D&C. SOCIAL HISTORY:  She is . ALLERGIES:  NONE. MEDICATIONS:  From home include Januvia 100 mg daily and Voltaren 75 mg b.i.d. REVIEW OF SYSTEMS:  Significant for abdominal pain. PHYSICAL EXAMINATION:  GENERAL:  Moderately obese female who appears to be alert and answers questions appropriately. VITAL SIGNS:  Temperature 99.5, currently BP 60/33, pulse is 102. LUNGS:  Clear. HEART:  Regular rate and rhythm. ABDOMEN:  Shows some mild right lower quadrant tenderness. LABORATORY DATA:  Lab work shows a chest x-ray which shows no acute changes. EKG showed nothing acute. ASSESSMENT:  Right distal ureteral calculi with signs and symptoms of sepsis. Urinalysis is clear, but may have infection behind the stone. RECOMMENDATIONS:  She is going to be stabilized in the ICU. She has been on vasopressors, Levophed, and a fluid challenge. We are going to emergently take her to the OR and do cystoscopy and right ureteral stent placement. We will discuss this further with the presence of an  who is due to arrive in approximately 30 minutes.       MD POOJA [email or web address]  D:  11/24/2019 1:31  T:  11/24/2019 3:28  JOB #:  [id number]

## 2019-11-24 NOTE — ANESTHESIA PREPROCEDURE EVALUATION
Relevant Problems   No relevant active problems       Anesthetic History   No history of anesthetic complications            Review of Systems / Medical History  Patient summary reviewed, nursing notes reviewed and pertinent labs reviewed    Pulmonary  Within defined limits                 Neuro/Psych   Within defined limits           Cardiovascular  Within defined limits                Exercise tolerance: >4 METS     GI/Hepatic/Renal         Renal disease: ARF and stones       Endo/Other    Diabetes         Other Findings   Comments: Septic shock on levophed           Physical Exam    Airway  Mallampati: III      Mouth opening: Normal     Cardiovascular  Regular rate and rhythm,  S1 and S2 normal,  no murmur, click, rub, or gallop             Dental  No notable dental hx       Pulmonary  Breath sounds clear to auscultation               Abdominal         Other Findings            Anesthetic Plan    ASA: 3, emergent  Anesthesia type: general          Induction: Intravenous  Anesthetic plan and risks discussed with: Patient       present and utilized

## 2019-11-24 NOTE — PROGRESS NOTES
H and P dictated. 44 yo female with 5 mm and 2 mm right distal ureteral stones. Admitted for pain control and conservative tx of stone. UA on admission showed no sign of UTI. After admission to floor, she began to c/o dyspnea,and developed fever to 103 and hypotension,BP 60/33. Has been transferred to ICU. Started on Levophed, Zosyn. PE: alert, weak         CV RRR, tachycardic. Lungs clear. Abdomen: RLQ tenderness. A: right distal ureteral stones  P: emergent cysto and right ureteral stent.

## 2019-11-24 NOTE — CONSULTS
CONSULT NOTE    Forest Wallace    11/24/2019    Date of Admission:  11/23/2019    The patient's chart is reviewed and the patient is discussed with the staff. Subjective: The patient is a 43 y.o.  female seen and evaluated at the request of Dr. Miguel Chaudhry as per the ICU [facility] protocol. The history is obtained through an  and review of records. She has a hx of DM and developed acute R flank/groin pain yesterday associated with fever and N/V. She went to [facility] but left after not being seen quickly enough. She presented here with severe hypotension and distress. She was febrile to 103.8* and she was cultured and Zosyn started. A R hydronephrosis was diagnosed and she was emergently taken to the OR for cystoscopy with R ureteral stent placement. She has returned to the ICU and was hypotensive. She had received 3L NSS prior to her ICU arrival and she remains hypotensive. Levophed has been started. She is making urine and she does not know what her BP usually runs. She has a lot of myalgias and also R shoulder pain. She has no history of lung or heart problems.        Review of Systems    Denies:anorexia, weight loss   Denies: blurry vision, loss of vision, eye pain, photophobia  Denies: hearing loss, ringing in the ears, earache, epistaxis  Denies: chest pain, palpitations, syncope, orthopnea, paroxysmal nocturnal dyspnea, claudication  Denies: dysphagia, odynophagia, nausea, vomiting, diarrhea, constipation, abdominal pain, jaundice, melena   Denies: frequency, dysuria, nocturia, urinary incontinence, stones, hematuria  Denies: polydipsia/polyuria, skin changes, temperature intolerance, unexpected weight gain  Denies: back pain, joint pain, joint swelling, muscle pain, muscle weakness  Denies: bleeding problems, blood transfusions, bruising, pallor, swollen lymph nodes  Denies: headache, dysarthria, blurred vision, diplopia, seizure, focal deficits. Admits to:  fevers, chills, sweats, fatigue, malaise, body aches, abdominopelvic/flank pain        Patient Active Problem List   Diagnosis Code    Ureteral stone N20.1    Septic shock ([facility] A41.9, R65.21    RHONDA (acute kidney injury) ([facility] N17.9    Diabetes ([facility] E11.9    Hydronephrosis due to obstruction of ureter N13.2         Prior to Admission Medications   Prescriptions Last Dose Informant Patient Reported? Taking? SITagliptin (JANUVIA) 100 mg tablet 11/23/2019 at Unknown time  Yes Yes   Sig: Take 100 mg by mouth daily. diclofenac EC (VOLTAREN) 75 mg EC tablet 11/23/2019 at Unknown time  No Yes   Sig: Take 1 Tab by mouth two (2) times a day.       Facility-Administered Medications: None       Past Medical History:   Diagnosis Date    Diabetes ([facility]      Past Surgical History:   Procedure Laterality Date    HX DILATION AND CURETTAGE       Social History     Socioeconomic History    Marital status: LEGALLY      Spouse name: Not on file    Number of children: Not on file    Years of education: Not on file    Highest education level: Not on file   Occupational History    Not on file   Social Needs    Financial resource strain: Not on file    Food insecurity:     Worry: Not on file     Inability: Not on file    Transportation needs:     Medical: Not on file     Non-medical: Not on file   Tobacco Use    Smoking status: Never Smoker   Substance and Sexual Activity    Alcohol use: Not on file    Drug use: Not on file    Sexual activity: Not on file   Lifestyle    Physical activity:     Days per week: Not on file     Minutes per session: Not on file    Stress: Not on file   Relationships    Social connections:     Talks on phone: Not on file     Gets together: Not on file     Attends Taoist service: Not on file     Active member of club or organization: Not on file     Attends meetings of clubs or organizations: Not on file     Relationship status: Not on file   [address] [place] [address] Intimate partner violence:     Fear of current or ex partner: Not on file     Emotionally abused: Not on file     Physically abused: Not on file     Forced sexual activity: Not on file   Other Topics Concern    Not on file   Social History Narrative    Not on file     No family history on file.   No Known Allergies    Current Facility-Administered Medications   Medication Dose Route Frequency    NOREPINephrine (LEVOPHED) 4 mg in 5% dextrose 250 mL infusion  0.5-16 mcg/min IntraVENous TITRATE    piperacillin-tazobactam (ZOSYN) 3.375 g in 0.9% sodium chloride (MBP/ADV) 100 mL  3.375 g IntraVENous Q8H    sodium chloride (NS) flush 5-40 mL  5-40 mL IntraVENous Q8H    sodium chloride (NS) flush 5-40 mL  5-40 mL IntraVENous PRN    NUTRITIONAL SUPPORT ELECTROLYTE PRN ORDERS   Does Not Apply PRN    morphine injection 2 mg  2 mg IntraVENous Q4H PRN    naloxone (NARCAN) injection 0.4 mg  0.4 mg IntraVENous PRN    dextrose 40% (GLUTOSE) oral gel 1 Tube  15 g Oral PRN    glucagon (GLUCAGEN) injection 1 mg  1 mg IntraMUSCular PRN    dextrose (D50W) injection syrg 12.5-25 g  25-50 mL IntraVENous PRN    potassium chloride 20 mEq in 100 ml IVPB  20 mEq IntraVENous Q2H    oxybutynin (DITROPAN) tablet 5 mg  5 mg Oral Q6H PRN    LORazepam (ATIVAN) injection 1 mg  1 mg IntraVENous Q6H PRN    0.9% sodium chloride infusion  125 mL/hr IntraVENous CONTINUOUS    acetaminophen (TYLENOL) tablet 650 mg  650 mg Oral Q4H PRN    HYDROcodone-acetaminophen (NORCO) 5-325 mg per tablet 1 Tab  1 Tab Oral Q4H PRN    ondansetron (ZOFRAN) injection 4 mg  4 mg IntraVENous Q4H PRN    tamsulosin (FLOMAX) capsule 0.4 mg  0.4 mg Oral DAILY    sodium chloride (NS) flush 5-10 mL  5-10 mL IntraVENous PRN    diphenhydrAMINE (BENADRYL) injection 12.5 mg  12.5 mg IntraVENous Q4H PRN         Objective:     Vitals:    11/24/19 0546 11/24/19 0601 11/24/19 0616 11/24/19 0630   BP: (!) 89/54 (!) 88/51 94/53 (!) 84/47   Pulse: (!) 110 (!) 113 (!) 113 (!) 117   Resp: 23 20 22 21   Temp:       SpO2: 92% 93% 94% 95%   Weight:       Height:           PHYSICAL EXAM     Constitutional:  the patient is well developed and in some acute distress due to pain  EENMT:  Sclera clear, pupils equal, oral mucosa moist  Respiratory: clear  Cardiovascular:  Tachy RRR without M,G,R  Gastrointestinal: soft and non-tender; with hypoactive bowel sounds. Musculoskeletal: warm without cyanosis. There is no lower extremity edema. Skin:  no jaundice or rashes  Neurologic: no gross neuro deficits     Psychiatric:  alert and FC    CXR:  clear      Recent Labs     11/23/19  2338 11/23/19  2240 11/23/19  1536   WBC 4.6 3.3* 13.6*   HGB 13.0 13.1 14.3   HCT 39.3 39.2 43.4   * 125* 239     Recent Labs     11/24/19  0336 11/23/19  2240 11/23/19  1536    138 139   K 3.3* 3.5 3.7   * 103 103   * 208* 220*   CO2 20* 22 27   BUN 18 15 15   CREA 1.59* 1.58* 1.09*   CA 7.0* 8.3 9.1   ALB  --  3.0* 3.7   TBILI  --  0.6 0.4   ALT  --  49 65   SGOT  --  29 35     No results for input(s): PH, PCO2, PO2, HCO3, PHI, PCO2I, PO2I, HCO3I in the last 72 hours. Recent Labs     11/24/19  0336 11/23/19  2240   LAC 6.7* 6.3*     Blood cx: NG   Urine pending    PCT 15.2    Assessment:  (Medical Decision Making)     Hospital Problems  Never Reviewed          Codes Class Noted POA    * (Principal) Septic shock ([facility] ICD-10-CM: A41.9, R65.21  ICD-9-CM: 038.9, 785.52, 995.92  11/24/2019 Unknown    Requiring levophed at present. CXR and clear. Can hydrate aggressively. RHONDA (acute kidney injury) ([facility] ICD-10-CM: N17.9  ICD-9-CM: 584.9  11/24/2019 Yes    From septic shock     Diabetes ([address] ICD-10-CM: E11.9  ICD-9-CM: 250.00  11/24/2019 Yes    SSI for now.      Hydronephrosis due to obstruction of ureter ICD-10-CM: N13.2  ICD-9-CM: 108, 593.4  11/24/2019 Yes    Now post stent    Ureteral stone ICD-10-CM: N20.1  ICD-9-CM: 592.1  11/23/2019 Unknown              Plan:  (Medical Decision Making) Aggressively hydrate. Try alternating LR with NSS since there is evidence that LR better on kidney with less renal failure. Levophed as needed. May need PICVC/CVL if she doesn't improve in the next few hours. Recheck albumin. SSI. Continue Zosyn for now. Consider Ulus Danette or Tobra x 1 if next creatinine down. Trend lactates. --    More than 50% of the time documented was spent in face-to-face contact with the patient and in the care of the patient on the floor/unit where the patient is located. Thank you very much for this referral.  We appreciate the opportunity to participate in this patient's care. Will follow along with above stated plan.     Silvia Araya MD

## 2019-11-24 NOTE — ED NOTES
TRANSFER - OUT REPORT:    Verbal report given to [address] [place] [address]  being transferred to   for routine progression of care       Report consisted of patients Situation, Background, Assessment and   Recommendations(SBAR). Information from the following report(s) SBAR, OR Summary, Intake/Output and MAR was reviewed with the receiving nurse. Lines:   Peripheral IV 11/23/19 Left Antecubital (Active)   Site Assessment Clean, dry, & intact 11/23/2019  3:30 PM   Phlebitis Assessment 0 11/23/2019  3:30 PM   Infiltration Assessment 0 11/23/2019  3:30 PM   Dressing Status Clean, dry, & intact 11/23/2019  3:30 PM   Dressing Type 4 X 4;Transparent 11/23/2019  3:30 PM   Hub Color/Line Status Pink 11/23/2019  3:30 PM        Opportunity for questions and clarification was provided.       Patient transported with:   [employer]

## 2019-11-24 NOTE — OP NOTES
[facility]  OPERATIVE REPORT    Name:  Ravindra Garza  MR#:  [id number]  :  1980  ACCOUNT #:  [de-identified]  DATE OF SERVICE:  2019    PREOPERATIVE DIAGNOSES:  1. Right ureteral calculus. 2.  Urosepsis. POSTOPERATIVE DIAGNOSES:  1. Right ureteral calculus. 2.  Urosepsis. PROCEDURE PERFORMED:  Cystoscopy and placement of right double-J ureteral stent. SURGEON:  Stu Sullivan MD    ASSISTANT:  None    ANESTHESIA:  General.    COMPLICATIONS:  None. SPECIMENS REMOVED:  Urine culture. IMPLANTS:  Right ureteral stent. ESTIMATED BLOOD LOSS:  Minimal.    FINDINGS:  Fluoroscopy shows contrast within the right renal pelvis consistent with obstruction after the previous CT scan. Stent placed successfully in the right side. DESCRIPTION OF PROCEDURE:  The patient was given general anesthetic, placed in the dorsal lithotomy position. She was prepped and draped in sterile fashion. Fluoroscopy was performed. There is contrast within a slightly distended right renal pelvis by fluoroscopy. A 22-Armenian cystoscope was advanced into the urethra using video camera and 30-degree lens. The ureter showed no evidence of stones or tumors. Both ureteral orifices were normal.  A 0.038 floppy-tip guidewire was passed up the right ureter into the area of the right renal pelvis using fluoroscopy. I then passed a 7-Armenian 24-cm long double-J stent successfully and left it in good position with a good coil in the renal pelvis and in the bladder. Urine drained through the stent was seen going into the bladder and we collected this urine by draining it through the scope. It was sent for urine culture. Moreno was placed and left indwelling. There was some bloody urine after the stent placement. PLAN:  She will be transferred back to the ICU.       MD POOJA Wolfe/[email or web address]  D:  2019 3:29  T:  2019 6:54  JOB #:  [id number]

## 2019-11-24 NOTE — PROGRESS NOTES
TRANSFER - IN REPORT:    Verbal report received from Lorelei(name) on [place]  being received from ED(unit) for routine progression of care      Report consisted of patients Situation, Background, Assessment and   Recommendations(SBAR). Information from the following report(s) SBAR, ED Summary, Recent results and MAR was reviewed with the receiving nurse. Opportunity for questions and clarification was provided. Assessment completed upon patients arrival to unit and care assumed.

## 2019-11-24 NOTE — PROGRESS NOTES
MD Kaya Foster at bedside     Update on pt condition/ status post op     Verbal orders placed for 1L bolus NS and to increase continuous fluids to 150 mL/hr    0.5 dilaudid ordered PRN for pain Q4    Will repeat LA @ 1000 as well at [facility]    Pt still requiring 16 of levo, if unable to wean pt will require central/ PICC line placement  (Per MD )

## 2019-11-24 NOTE — BRIEF OP NOTE
BRIEF OPERATIVE NOTE    Date of Procedure: 11/24/2019   Preoperative Diagnosis: Right Ureteral Stone  Postoperative Diagnosis: Right Ureteral Stone    Procedure(s):  CYSTOSCOPY URETERAL STENT INSERTION  Surgeon(s) and Role:     * Porfirio Trejo MD - Primary         Surgical Assistant: none    Surgical Staff:  Circ-1: Luis Reynaga RN  Scrub Tech-1: Portillo Jansen  Event Time In Time Out   Incision Start 11/24/2019 0254    Incision Close       Anesthesia: General   Estimated Blood Loss: minimal  Specimens:   ID Type Source Tests Collected by Time Destination   1 : Urine  Urine  CULTURE, URINE Porfirio Trejo MD 11/24/2019 0302 Microbiology      Findings: see op note   Complications: none  Implants: * No implants in log *

## 2019-11-24 NOTE — PERIOP NOTES
TRANSFER - OUT REPORT:    Verbal report given to Via Claribel Walker on Bro Cantor  being transferred to ICU for routine progression of care       Report consisted of patients Situation, Background, Assessment and   Recommendations(SBAR). Information from the following report(s) Procedure Summary was reviewed with the receiving nurse. Lines:   Peripheral IV 11/24/19 Anterior;Right Forearm (Active)   Site Assessment Clean, dry, & intact 11/24/2019  2:01 AM   Phlebitis Assessment 0 11/24/2019  2:01 AM   Infiltration Assessment 0 11/24/2019  2:01 AM   Dressing Status Clean, dry, & intact 11/24/2019  2:01 AM   Dressing Type Transparent;Tape 11/24/2019  2:01 AM   Hub Color/Line Status Blue; Infusing 11/24/2019  2:01 AM       Peripheral IV 11/24/19 Distal;Right;Upper (Active)   Site Assessment Clean, dry, & intact 11/24/2019  2:01 AM   Phlebitis Assessment 0 11/24/2019  2:01 AM   Infiltration Assessment 0 11/24/2019  2:01 AM   Dressing Status Clean, dry, & intact 11/24/2019  2:01 AM   Dressing Type Transparent;Tape 11/24/2019  2:01 AM   Hub Color/Line Status Blue;Flushed;Capped 11/24/2019  2:01 AM        Opportunity for questions and clarification was provided.       Patient transported with:   Monitor  O2 @ 3 liters  Registered Nurse

## 2019-11-24 NOTE — PROGRESS NOTES
[facility] CRITICAL CARE OUTREACH NURSE PROGRESS REPORT      SUBJECTIVE: Called to assess patient secondary to nurse concern. MEWS Score: 10 (11/23/19 2250)  Vitals:    11/23/19 2250 11/24/19 0003 11/24/19 0029 11/24/19 0033   BP: 91/45 (!) 68/42  (!) 66/36   Pulse: (!) 131 (!) 116  (!) 107   Resp: 22 20     Temp: (!) 103.8 °F (39.9 °C) (!) 101.3 °F (38.5 °C)     SpO2: 97% 100%  96%   Weight:   88.5 kg (195 lb)         LAB DATA:    Recent Labs     11/23/19 2240 11/23/19  1536    139   K 3.5 3.7    103   CO2 22 27   AGAP 13 9   * 220*   BUN 15 15   CREA 1.58* 1.09*   GFRAA 47* >60   GFRNA 39* 59*   CA 8.3 9.1   ALB 3.0* 3.7   TP 6.8 8.3*   GLOB 3.8* 4.6*   AGRAT 0.8* 0.8*   ALT 49 65        Recent Labs     11/23/19 2338 11/23/19 2240 11/23/19  1536   WBC 4.6 3.3* 13.6*   HGB 13.0 13.1 14.3   HCT 39.3 39.2 43.4   * 125* 239          OBJECTIVE: On arrival to room, I found patient to be sleeping in bed. Pain Assessment  Pain Intensity 1: 0 (11/23/19 2344)  Pain Location 1: Back, Flank     Patient Stated Pain Goal: 0                                 ASSESSMENT:  Called by primary RN due to significant hypotension. 66/31. . Dr. Amanda Bradshaw paged by primary RN, order for fluid bolus (30 ml/kg sepsis bolus) and noted elevated Lactic. Patient receiving IV Rocephin. Patient does not speak English, but opens eyes to voice and nods appropriately. Lung sounds clear. Peripheral pulses palpable. 02 sat 96% on 2 L NC. Patient febrile, given Tylenol 2 hours ago. Did receive Norco 3 hours ago. PLAN:  Stat consult to hospitalist being called. Outreach RN staying with patient at bedside. Dr. Clementina Sorto called by primary RN, orders being placed to transfer to critical care. Changed abx to Zosyn.

## 2019-11-24 NOTE — PROGRESS NOTES
New results no better. MD made aware. Severe Hypotension noted. MD made aware. Hospitalist consult placed. Pt sat up and bolus started for hypotension. MD ordered abx.  Will continue to monitor

## 2019-11-24 NOTE — PROGRESS NOTES
MD Remi Lala updated on pt's new lab values and plan for   PICC line placement     No new orders at this time     Will continue to monitor

## 2019-11-24 NOTE — PROGRESS NOTES
TRANSFER - IN REPORT:    Verbal report received from [facility]) on Forest Wallace  being received from [phone] (unit) for change in patient condition(hypotensive)      Report consisted of patients Situation, Background, Assessment and   Recommendations(SBAR). Information from the following report(s) SBAR, Kardex, ED Summary, Intake/Output, MAR, Accordion, Recent Results, Med Rec Status, Cardiac Rhythm ST and Alarm Parameters  was reviewed with the receiving nurse. Opportunity for questions and clarification was provided. Assessment completed upon patients arrival to unit and care assumed.

## 2019-11-24 NOTE — PROGRESS NOTES
PICC Placement Note    PRE-PROCEDURE VERIFICATION  Correct Procedure: yes. Time out completed with assistant Red Berumen RN VAT and all persons present in agreement with time out. Correct Site:  yes  Temperature: Temp: 98.7 °F (37.1 °C), Temperature Source: Temp Source: Oral  Recent Labs     11/24/19  0945  11/23/19  2338   BUN 16   < >  --    CREA 1.19*   < >  --    PLT  --   --  136*   WBC  --   --  4.6    < > = values in this interval not displayed. Allergies: Patient has no known allergies. Education materials for [facility] given to patient or family. PROCEDURE DETAIL  A triple lumen PICC line was started for vascular access. The following documentation is in addition to the PICC properties in the lines/airways flowsheet :  Lot #: [id number]  xylocaine used: yes  Mid-Arm Circumference: 30 (cm)  Internal Catheter Length: 37 (cm)  Internal Catheter Total Length: 37 (cm)  Vein Selection for PICC:right basilic  Central Line Bundle followed yes  Complication Related to Insertion: none  Both the insertion guidewire and ECG guidewire were removed intact all ports have positive blood return and were flush well with normal saline. The location of the tip of the PICC is verified using ECG technology. The tip is in the SVC per ECG reading. See image below.      Line is okay to use: yes

## 2019-11-24 NOTE — PROGRESS NOTES
Patient received from 6th floor with RN at the bedside with NS and levophed gtt infusing. Patient alert and oriented x4.  at the bedside. Patient placed on ICU monitor, tachycardic and BP 70s/40s, levophed titrated accordingly. Patient bathed with CHG wipes and a dual skin assessment performed with Ana Maria Butterfield RN. Patient skin clear and free from abnormalities. Christophe score 18, no allevyn placed at this time.

## 2019-11-24 NOTE — PROGRESS NOTES
TRANSFER - OUT REPORT:    Verbal report given to Jade Case RN and Dr. Shauna Cisneros) on [facility]  being transferred to OR (unit) for ordered procedure       Report consisted of patients Situation, Background, Assessment and   Recommendations(SBAR). Information from the following report(s) SBAR, Kardex, ED Summary, MAR, Accordion, Recent Results, Med Rec Status and Cardiac Rhythm NSR/ST was reviewed with the receiving nurse. Opportunity for questions and clarification was provided.       Patient transported with:   Monitor  O2 @ 2 liters  Registered Nurse

## 2019-11-24 NOTE — PROGRESS NOTES
rounded on patient and was present for assessment with nurse. Interpreting services offered when needed. Thank you,      Perla Easley 91  [email or web address] c: [phone] / [address] / [place], [address] [facility]  [email or web address]. [facility]

## 2019-11-24 NOTE — PROGRESS NOTES
Pt with no s/sx of skin breakdown. Ambulatory at baseline. Dual skin with [facility]. Oriented to room with blue phone.

## 2019-11-25 ENCOUNTER — APPOINTMENT (OUTPATIENT)
Dept: GENERAL RADIOLOGY | Age: 39
DRG: 853 | End: 2019-11-25
Attending: INTERNAL MEDICINE
Payer: SELF-PAY

## 2019-11-25 PROBLEM — D69.6 THROMBOCYTOPENIA (HCC): Status: ACTIVE | Noted: 2019-11-25

## 2019-11-25 PROBLEM — E87.20 LACTIC ACIDOSIS: Status: ACTIVE | Noted: 2019-11-25

## 2019-11-25 PROBLEM — E77.8 HYPOPROTEINEMIA (HCC): Status: ACTIVE | Noted: 2019-11-25

## 2019-11-25 LAB
ALBUMIN SERPL-MCNC: 2.2 G/DL (ref 3.5–5)
ALBUMIN/GLOB SERPL: 0.7 {RATIO} (ref 1.2–3.5)
ALP SERPL-CCNC: 57 U/L (ref 50–136)
ALT SERPL-CCNC: 34 U/L (ref 12–65)
ANION GAP SERPL CALC-SCNC: 5 MMOL/L (ref 7–16)
APTT PPP: 39 SEC (ref 24.7–39.8)
AST SERPL-CCNC: 24 U/L (ref 15–37)
BILIRUB SERPL-MCNC: 1.3 MG/DL (ref 0.2–1.1)
BUN SERPL-MCNC: 10 MG/DL (ref 6–23)
CALCIUM SERPL-MCNC: 6.7 MG/DL (ref 8.3–10.4)
CHLORIDE SERPL-SCNC: 112 MMOL/L (ref 98–107)
CK SERPL-CCNC: 117 U/L (ref 21–215)
CO2 SERPL-SCNC: 25 MMOL/L (ref 21–32)
CREAT SERPL-MCNC: 0.7 MG/DL (ref 0.6–1)
ERYTHROCYTE [DISTWIDTH] IN BLOOD BY AUTOMATED COUNT: 13.1 % (ref 11.9–14.6)
FIBRINOGEN PPP-MCNC: 485 MG/DL (ref 190–501)
GLOBULIN SER CALC-MCNC: 3.1 G/DL (ref 2.3–3.5)
GLUCOSE BLD STRIP.AUTO-MCNC: 148 MG/DL (ref 65–100)
GLUCOSE BLD STRIP.AUTO-MCNC: 164 MG/DL (ref 65–100)
GLUCOSE BLD STRIP.AUTO-MCNC: 171 MG/DL (ref 65–100)
GLUCOSE BLD STRIP.AUTO-MCNC: 182 MG/DL (ref 65–100)
GLUCOSE SERPL-MCNC: 152 MG/DL (ref 65–100)
HCT VFR BLD AUTO: 33.3 % (ref 35.8–46.3)
HGB BLD-MCNC: 11 G/DL (ref 11.7–15.4)
INR PPP: 1.6
LACTATE SERPL-SCNC: 1.8 MMOL/L (ref 0.4–2)
LACTATE SERPL-SCNC: 1.9 MMOL/L (ref 0.4–2)
MCH RBC QN AUTO: 31.1 PG (ref 26.1–32.9)
MCHC RBC AUTO-ENTMCNC: 33 G/DL (ref 31.4–35)
MCV RBC AUTO: 94.1 FL (ref 79.6–97.8)
NRBC # BLD: 0 K/UL (ref 0–0.2)
PLATELET # BLD AUTO: 64 K/UL (ref 150–450)
PMV BLD AUTO: 10.7 FL (ref 9.4–12.3)
POTASSIUM SERPL-SCNC: 3.4 MMOL/L (ref 3.5–5.1)
POTASSIUM SERPL-SCNC: 3.7 MMOL/L (ref 3.5–5.1)
PROT SERPL-MCNC: 5.3 G/DL (ref 6.3–8.2)
PROTHROMBIN TIME: 19.9 SEC (ref 11.7–14.5)
RBC # BLD AUTO: 3.54 M/UL (ref 4.05–5.2)
SODIUM SERPL-SCNC: 142 MMOL/L (ref 136–145)
WBC # BLD AUTO: 13.3 K/UL (ref 4.3–11.1)

## 2019-11-25 PROCEDURE — 82550 ASSAY OF CK (CPK): CPT

## 2019-11-25 PROCEDURE — P9045 ALBUMIN (HUMAN), 5%, 250 ML: HCPCS | Performed by: INTERNAL MEDICINE

## 2019-11-25 PROCEDURE — 74011250636 HC RX REV CODE- 250/636: Performed by: UROLOGY

## 2019-11-25 PROCEDURE — 74011250636 HC RX REV CODE- 250/636: Performed by: INTERNAL MEDICINE

## 2019-11-25 PROCEDURE — 36592 COLLECT BLOOD FROM PICC: CPT

## 2019-11-25 PROCEDURE — 83605 ASSAY OF LACTIC ACID: CPT

## 2019-11-25 PROCEDURE — 87040 BLOOD CULTURE FOR BACTERIA: CPT

## 2019-11-25 PROCEDURE — 82962 GLUCOSE BLOOD TEST: CPT

## 2019-11-25 PROCEDURE — 77030019605

## 2019-11-25 PROCEDURE — 36415 COLL VENOUS BLD VENIPUNCTURE: CPT

## 2019-11-25 PROCEDURE — 74011000250 HC RX REV CODE- 250: Performed by: INTERNAL MEDICINE

## 2019-11-25 PROCEDURE — 85027 COMPLETE CBC AUTOMATED: CPT

## 2019-11-25 PROCEDURE — 74011250637 HC RX REV CODE- 250/637: Performed by: UROLOGY

## 2019-11-25 PROCEDURE — 87491 CHLMYD TRACH DNA AMP PROBE: CPT

## 2019-11-25 PROCEDURE — 85384 FIBRINOGEN ACTIVITY: CPT

## 2019-11-25 PROCEDURE — 74011636637 HC RX REV CODE- 636/637: Performed by: INTERNAL MEDICINE

## 2019-11-25 PROCEDURE — 84132 ASSAY OF SERUM POTASSIUM: CPT

## 2019-11-25 PROCEDURE — 74011250637 HC RX REV CODE- 250/637: Performed by: INTERNAL MEDICINE

## 2019-11-25 PROCEDURE — 99233 SBSQ HOSP IP/OBS HIGH 50: CPT | Performed by: INTERNAL MEDICINE

## 2019-11-25 PROCEDURE — 85730 THROMBOPLASTIN TIME PARTIAL: CPT

## 2019-11-25 PROCEDURE — 85610 PROTHROMBIN TIME: CPT

## 2019-11-25 PROCEDURE — 71045 X-RAY EXAM CHEST 1 VIEW: CPT

## 2019-11-25 PROCEDURE — 80053 COMPREHEN METABOLIC PANEL: CPT

## 2019-11-25 PROCEDURE — 65610000001 HC ROOM ICU GENERAL

## 2019-11-25 PROCEDURE — 77030020255 HC SOL INJ LR 1000ML BG

## 2019-11-25 PROCEDURE — 74011000258 HC RX REV CODE- 258: Performed by: UROLOGY

## 2019-11-25 PROCEDURE — 77010033678 HC OXYGEN DAILY

## 2019-11-25 PROCEDURE — 74011000258 HC RX REV CODE- 258: Performed by: INTERNAL MEDICINE

## 2019-11-25 PROCEDURE — 74011250636 HC RX REV CODE- 250/636: Performed by: HOSPITALIST

## 2019-11-25 PROCEDURE — 77030011256 HC DRSG MEPILEX <16IN NO BORD MOLN -A

## 2019-11-25 PROCEDURE — 77030020263 HC SOL INJ SOD CL0.9% LFCR 1000ML

## 2019-11-25 PROCEDURE — 74011000250 HC RX REV CODE- 250: Performed by: UROLOGY

## 2019-11-25 RX ORDER — PHYTONADIONE 5 MG/1
10 TABLET ORAL
Status: COMPLETED | OUTPATIENT
Start: 2019-11-25 | End: 2019-11-25

## 2019-11-25 RX ORDER — ACETAMINOPHEN 325 MG/1
650 TABLET ORAL
Status: DISCONTINUED | OUTPATIENT
Start: 2019-11-25 | End: 2019-11-28 | Stop reason: HOSPADM

## 2019-11-25 RX ORDER — TAMSULOSIN HYDROCHLORIDE 0.4 MG/1
0.4 CAPSULE ORAL DAILY
Status: DISCONTINUED | OUTPATIENT
Start: 2019-11-25 | End: 2019-11-28 | Stop reason: HOSPADM

## 2019-11-25 RX ORDER — POTASSIUM CHLORIDE 14.9 MG/ML
20 INJECTION INTRAVENOUS
Status: COMPLETED | OUTPATIENT
Start: 2019-11-25 | End: 2019-11-25

## 2019-11-25 RX ORDER — ALBUMIN HUMAN 50 G/1000ML
25 SOLUTION INTRAVENOUS ONCE
Status: COMPLETED | OUTPATIENT
Start: 2019-11-25 | End: 2019-11-25

## 2019-11-25 RX ADMIN — KETOROLAC TROMETHAMINE 15 MG: 15 INJECTION, SOLUTION INTRAMUSCULAR; INTRAVENOUS at 02:57

## 2019-11-25 RX ADMIN — INSULIN LISPRO 2 UNITS: 100 INJECTION, SOLUTION INTRAVENOUS; SUBCUTANEOUS at 16:18

## 2019-11-25 RX ADMIN — FAMOTIDINE 20 MG: 10 INJECTION INTRAVENOUS at 09:46

## 2019-11-25 RX ADMIN — HYDROMORPHONE HYDROCHLORIDE 0.5 MG: 1 INJECTION, SOLUTION INTRAMUSCULAR; INTRAVENOUS; SUBCUTANEOUS at 11:34

## 2019-11-25 RX ADMIN — SODIUM CHLORIDE, SODIUM LACTATE, POTASSIUM CHLORIDE, AND CALCIUM CHLORIDE 150 ML/HR: 600; 310; 30; 20 INJECTION, SOLUTION INTRAVENOUS at 05:19

## 2019-11-25 RX ADMIN — Medication 6 MCG/MIN: at 02:29

## 2019-11-25 RX ADMIN — PIPERACILLIN AND TAZOBACTAM 3.38 G: 3; .375 INJECTION, POWDER, FOR SOLUTION INTRAVENOUS at 02:40

## 2019-11-25 RX ADMIN — INSULIN LISPRO 2 UNITS: 100 INJECTION, SOLUTION INTRAVENOUS; SUBCUTANEOUS at 21:12

## 2019-11-25 RX ADMIN — PIPERACILLIN SODIUM AND TAZOBACTAM SODIUM 4.5 G: 4; .5 INJECTION, POWDER, LYOPHILIZED, FOR SOLUTION INTRAVENOUS at 17:36

## 2019-11-25 RX ADMIN — Medication 30 ML: at 05:02

## 2019-11-25 RX ADMIN — Medication 5 ML: at 14:00

## 2019-11-25 RX ADMIN — POTASSIUM CHLORIDE 20 MEQ: 200 INJECTION, SOLUTION INTRAVENOUS at 07:18

## 2019-11-25 RX ADMIN — ONDANSETRON 4 MG: 2 INJECTION INTRAMUSCULAR; INTRAVENOUS at 20:45

## 2019-11-25 RX ADMIN — Medication 10 ML: at 05:03

## 2019-11-25 RX ADMIN — HYDROMORPHONE HYDROCHLORIDE 0.5 MG: 1 INJECTION, SOLUTION INTRAMUSCULAR; INTRAVENOUS; SUBCUTANEOUS at 07:30

## 2019-11-25 RX ADMIN — HYDROMORPHONE HYDROCHLORIDE 0.5 MG: 1 INJECTION, SOLUTION INTRAMUSCULAR; INTRAVENOUS; SUBCUTANEOUS at 21:05

## 2019-11-25 RX ADMIN — PHYTONADIONE 10 MG: 5 TABLET ORAL at 07:19

## 2019-11-25 RX ADMIN — ACETAMINOPHEN 650 MG: 325 TABLET, FILM COATED ORAL at 03:18

## 2019-11-25 RX ADMIN — SODIUM CHLORIDE 250 ML: 900 INJECTION, SOLUTION INTRAVENOUS at 19:55

## 2019-11-25 RX ADMIN — POTASSIUM CHLORIDE 20 MEQ: 200 INJECTION, SOLUTION INTRAVENOUS at 05:02

## 2019-11-25 RX ADMIN — Medication 10 ML: at 22:00

## 2019-11-25 RX ADMIN — ACETAMINOPHEN 650 MG: 325 TABLET, FILM COATED ORAL at 14:37

## 2019-11-25 RX ADMIN — HYDROMORPHONE HYDROCHLORIDE 0.5 MG: 1 INJECTION, SOLUTION INTRAMUSCULAR; INTRAVENOUS; SUBCUTANEOUS at 15:43

## 2019-11-25 RX ADMIN — ALBUMIN (HUMAN) 25 G: 12.5 INJECTION, SOLUTION INTRAVENOUS at 05:01

## 2019-11-25 RX ADMIN — Medication 30 ML: at 22:00

## 2019-11-25 RX ADMIN — TAMSULOSIN HYDROCHLORIDE 0.4 MG: 0.4 CAPSULE ORAL at 11:24

## 2019-11-25 RX ADMIN — INSULIN LISPRO 2 UNITS: 100 INJECTION, SOLUTION INTRAVENOUS; SUBCUTANEOUS at 11:34

## 2019-11-25 RX ADMIN — Medication 30 ML: at 14:42

## 2019-11-25 RX ADMIN — HEPARIN SODIUM (PORCINE) LOCK FLUSH IV SOLN 100 UNIT/ML 900 UNITS: 100 SOLUTION at 05:02

## 2019-11-25 RX ADMIN — FAMOTIDINE 20 MG: 10 INJECTION INTRAVENOUS at 21:12

## 2019-11-25 RX ADMIN — PIPERACILLIN AND TAZOBACTAM 3.38 G: 3; .375 INJECTION, POWDER, FOR SOLUTION INTRAVENOUS at 09:44

## 2019-11-25 NOTE — PROGRESS NOTES
Critical Care Daily Progress Note: 11/25/2019    Forest Wallace   Admission Date: 11/23/2019         The patient's chart is reviewed and the patient is discussed with the staff. The patient is a 43 y.o.  female seen and evaluated at the request of Dr. Lay Hoskins as per the ICU [facility] protocol. The history is obtained through an  and review of records.      She has a hx of DM and developed acute R flank/groin pain yesterday associated with fever and N/V. She went to [facility] but left after not being seen quickly enough. She presented here with severe hypotension and distress. She was febrile to 103.8* and she was cultured and Zosyn started. A R hydronephrosis was diagnosed and she was emergently taken to the OR for cystoscopy with R ureteral stent placement. She has returned to the ICU and was hypotensive. She had received 3L NSS prior to her ICU arrival and she remains hypotensive. Levophed has been started.         She is making urine and she does not know what her BP usually runs. She has a lot of myalgias and also R shoulder pain. She has no history of lung or heart problems. Subjective:     PICC placed yesterday and weaned off levophed transiently but now back on at 6 ugm/kg/min. BP still marginal. Feels better per . Had had pain in back L molar which is back. Some sputum production this AM. Platelets down to 59K with no overt bleeding evident. UOP 2350 yesterday with improved Scr.      Current Facility-Administered Medications   Medication Dose Route Frequency    potassium chloride 20 mEq in 100 ml IVPB  20 mEq IntraVENous Q2H    NOREPINephrine (LEVOPHED) 4 mg in 5% dextrose 250 mL infusion  0.5-16 mcg/min IntraVENous TITRATE    piperacillin-tazobactam (ZOSYN) 3.375 g in 0.9% sodium chloride (MBP/ADV) 100 mL  3.375 g IntraVENous Q8H    sodium chloride (NS) flush 5-40 mL  5-40 mL IntraVENous Q8H    sodium chloride (NS) flush 5-40 mL 5-40 mL IntraVENous PRN    NUTRITIONAL SUPPORT ELECTROLYTE PRN ORDERS   Does Not Apply PRN    naloxone (NARCAN) injection 0.4 mg  0.4 mg IntraVENous PRN    dextrose 40% (GLUTOSE) oral gel 1 Tube  15 g Oral PRN    glucagon (GLUCAGEN) injection 1 mg  1 mg IntraMUSCular PRN    dextrose (D50W) injection syrg 12.5-25 g  25-50 mL IntraVENous PRN    HYDROmorphone (PF) (DILAUDID) injection 0.5 mg  0.5 mg IntraVENous Q4H PRN    insulin lispro (HUMALOG) injection 0-10 Units  0-10 Units SubCUTAneous AC&HS    lactated Ringers infusion  150 mL/hr IntraVENous CONTINUOUS    ketorolac (TORADOL) injection 15 mg  15 mg IntraVENous Q8H PRN    famotidine (PF) (PEPCID) 20 mg in sodium chloride 0.9% 10 mL injection  20 mg IntraVENous Q12H    sodium chloride (NS) flush 30 mL  30 mL InterCATHeter Q8H    heparin (porcine) pf 900 Units  900 Units InterCATHeter Q8H    sodium chloride (NS) flush 30 mL  30 mL InterCATHeter PRN    heparin (porcine) pf 900 Units  900 Units InterCATHeter PRN    oxybutynin (DITROPAN) tablet 5 mg  5 mg Oral Q6H PRN    LORazepam (ATIVAN) injection 1 mg  1 mg IntraVENous Q6H PRN    0.9% sodium chloride infusion  150 mL/hr IntraVENous CONTINUOUS    acetaminophen (TYLENOL) tablet 650 mg  650 mg Oral Q4H PRN    HYDROcodone-acetaminophen (NORCO) 5-325 mg per tablet 1 Tab  1 Tab Oral Q4H PRN    ondansetron (ZOFRAN) injection 4 mg  4 mg IntraVENous Q4H PRN    [Held by provider] tamsulosin (FLOMAX) capsule 0.4 mg  0.4 mg Oral DAILY    sodium chloride (NS) flush 5-10 mL  5-10 mL IntraVENous PRN    diphenhydrAMINE (BENADRYL) injection 12.5 mg  12.5 mg IntraVENous Q4H PRN       Review of Systems    Constitutional:  negative for fever, chills, sweats  Cardiovascular:  negative for chest pain, palpitations, syncope, edema  Gastrointestinal:  negative for dysphagia, reflux, vomiting, diarrhea, , or melena; some pelvic pain persists  Neurologic:  negative for focal weakness, numbness, headache      Objective:     Vitals:    11/25/19 0401 11/25/19 0416 11/25/19 0431 11/25/19 0445   BP: 90/50 90/52 92/53 90/51   Pulse: (!) 104 (!) 103 99 97   Resp: 22 21 23 19   Temp:   98.7 °F (37.1 °C)    SpO2: 96% 92% 96% 96%   Weight:       Height:             Intake/Output Summary (Last 24 hours) at 11/25/2019 0531  Last data filed at 11/25/2019 8339  Gross per 24 hour   Intake 5506.25 ml   Output 2850 ml   Net 2656.25 ml       Physical Exam:          Constitutional:  the patient is well developed and in no acute distress on NC  EENMT:  Sclera clear, pupils equal, oral mucosa moist  Respiratory: clear  Cardiovascular:  RRR without M,G,R  Gastrointestinal: soft and non-tender; with hypoactive bowel sounds. Musculoskeletal: warm without cyanosis. There is no lower extremity edema. Skin:  no jaundice or rashes  Neurologic: no gross neuro deficits     Psychiatric:  alert and FC    LINES:  RUE PICC, haney     DRIPS:  Levophed 6    CXR: pending      LAB  Recent Labs     11/24/19  2238 11/24/19  1628 11/24/19  1116 11/24/19  0715 11/23/19  2056   GLUCPOC 183* 192* 259* 196* 142*      Recent Labs     11/25/19  0308 11/23/19  2338 11/23/19  2240   WBC 13.3* 4.6 3.3*   HGB 11.0* 13.0 13.1   HCT 33.3* 39.3 39.2   PLT 64* 136* 125*     Recent Labs     11/25/19  0308 11/24/19  2239 11/24/19  0945 11/24/19  0656  11/23/19  2240     --  142 143   < > 138   K 3.4*  --  3.7 3.3*   < > 3.5   *  --  113* 113*   < > 103   CO2 25  --  20* 19*   < > 22   *  --  225* 213*   < > 208*   BUN 10  --  16 16   < > 15   CREA 0.70  --  1.19* 1.43*   < > 1.58*   CA 6.7*  --  6.5* 6.5*   < > 8.3   ALB 2.2* 2.3*  --  2.1*  --  3.0*   TBILI 1.3*  --   --  0.7  --  0.6   ALT 34  --   --  35  --  49   SGOT 24  --   --  23  --  29    < > = values in this interval not displayed. No results for input(s): PH, PCO2, PO2, HCO3, PHI, PCO2I, PO2I, HCO3I in the last 72 hours.   Recent Labs     11/25/19  0308 11/24/19  2230 11/24/19  1830   LAC 1.8 2.1* 4.5*     No results for input(s): PH, PCO2, PO2, HCO3 in the last 72 hours. Blood cx: GNR x 2. Urine Cx: NGSF (? If obtained after Abx started)    Assessment:  (Medical Decision Making)     Hospital Problems  Never Reviewed          Codes Class Noted POA    Thrombocytopenia ([facility] ICD-10-CM: D69.6  ICD-9-CM: 287.5  11/25/2019 Unknown    Check coags. Lactic acidosis ICD-10-CM: E87.2  ICD-9-CM: 276.2  11/25/2019 Unknown    Resolving     Hypoproteinemia ([facility] ICD-10-CM: E77.8  ICD-9-CM: 273.8  11/25/2019 Unknown    Ongoing. * (Principal) Septic shock ([facility]- due to GNR ICD-10-CM: A41.9, R65.21  ICD-9-CM: 038.9, 785.52, 995.92  11/24/2019 Unknown    Pressor requirements better    RHONDA (acute kidney injury) ([facility] ICD-10-CM: N17.9  ICD-9-CM: 584.9  11/24/2019 Yes    Cr normal.    Diabetes ([facility] ICD-10-CM: E11.9  ICD-9-CM: 250.00  11/24/2019 Yes    Improved    Hydronephrosis due to obstruction of ureter ICD-10-CM: N13.2  ICD-9-CM: 348, 593.4  11/24/2019 Yes        Ureteral stone ICD-10-CM: N20.1  ICD-9-CM: 592.1  11/23/2019 Unknown    Overview Signed 11/24/2019  1:07 PM by Mona Pelletier MD        Date of Procedure: 11/24/2019   Preoperative Diagnosis: Right Ureteral Stone  Postoperative Diagnosis: Right Ureteral Stone    Procedure(s):  CYSTOSCOPY URETERAL STENT INSERTION                   Plan:  (Medical Decision Making)     Continue Abx. Wean levophed as able. IV albumin to help wean pressors. Await coags. Supplement K. Check CXR since she is starting to produce sputum. --    More than 50% of the time documented was spent in face-to-face contact with the patient and in the care of the patient on the floor/unit where the patient is located.     Thealee Olivares MD

## 2019-11-25 NOTE — PROGRESS NOTES
Initial visit made to patient and a prayer was provided. Michaela Stovall, a [address]   assisted the . The patient was alert and oriented.          Damaris Rhodes MDIV

## 2019-11-25 NOTE — PROGRESS NOTES
Physical Therapy Note:    Participated in interdisciplinary rounds in ICU/CCU and chart reviewed. Patient is experiencing decrease in function from baseline. Patient would benefit from skilled acute therapy to increase independence with self care/ADLs, strength, endurance, and functional mobility. Recommend PT/OT consult when medically stable and MD agrees.     Thank you for your consideration,  Franky Hdez, PT, DPT

## 2019-11-25 NOTE — PROGRESS NOTES
Progress Note    Patient: Taylor Barraza MRN: [id number]  SSN: [id number]    YOB: 1980  Age: 43 y.o. Sex: female      Admit Date: 11/23/2019    LOS: 2 days     Subjective: This is a 44 YO female patient with a PMH of DM type II. Admitted with sepsis secondary to UTI and obstructive uropathy with renal stones in the R ureter with R hydro-nephrosis. The patient developed septic shock and had to be transferred to the ICU for pressor support and had a cystoscopy with stent insertion in the R ureter on 11/23 . Still hypotensive and requiring IV pressors this morning. Blood culture reported gram negative rods. On IV zosyn. Repeated blood cultures today. PICC line inserted on 11/24. Feeling better but complains of a persistent headache and muscular aches. Objective:     Vitals:    11/25/19 1500 11/25/19 1540 11/25/19 1601 11/25/19 1701   BP: 127/80  102/59 99/58   Pulse: (!) 109  (!) 114 (!) 105   Resp: 20 22 24   Temp:  100.3 °F (37.9 °C)     SpO2: 95%  93% 94%   Weight:       Height:            Intake and Output:  Current Shift: 11/25 0701 - 11/25 1900  In: 4020 [I.V.:1803]  Out: 700 [Urine:700]  Last three shifts: 11/23 1901 - 11/25 0700  In: 91414.8 [P.O.:660; I.V.:34368.8]  Out: 3175 [Urine:3175]    Physical Exam:   GENERAL: alert, appears stated age  EYE: conjunctivae/corneas clear. LYMPHATIC: Cervical, supraclavicular, and axillary nodes normal.   THROAT & NECK: normal and no erythema or exudates noted. LUNG: clear to auscultation bilaterally  HEART: regular rate and rhythm, S1, S2 normal, no murmur, click, rub or gallop  ABDOMEN: soft, non-tender. Bowel sounds normal. No masses,  no organomegaly  EXTREMITIES:  extremities normal, atraumatic, no cyanosis or edema  SKIN: Normal.  NEUROLOGIC: no focal deficits. Lethargic. PSYCHIATRIC: non focal    Lab/Data Review: All lab results for the last 24 hours reviewed.          Assessment:     Principal Problem: Septic shock ([address]- due to GNR (11/24/2019)    Active Problems:    Ureteral stone (11/23/2019)      Overview:        Date of Procedure: 11/24/2019       Preoperative Diagnosis: Right Ureteral Stone      Postoperative Diagnosis: Right Ureteral Stone        Procedure(s):      CYSTOSCOPY URETERAL STENT INSERTION      RHONDA (acute kidney injury) ([address] (11/24/2019)      Diabetes ([address] (11/24/2019)      Hydronephrosis due to obstruction of ureter (11/24/2019)      Thrombocytopenia ([address] (11/25/2019)      Lactic acidosis (11/25/2019)      Hypoproteinemia ([address] (11/25/2019)        Plan:     -IV fluids  -IV pressors ( wean off levophed as tolerated)   -IV zosyn ( day 2 )   -repeated blood cultures today   -monitor VS  -IV pepcid   -tramadol prn for pain   -has a broken tooth in the left lower jaw.  I do not see evidence of abscess/ monitor / will need to have a tooth extraction as an outpatient     Signed By: Alysha Mo MD     November 25, 2019

## 2019-11-25 NOTE — PROGRESS NOTES
available on-site from 4:30 p.m. - 1:00 a.m. Please call [facility] at [phone] with any interpreting requests. Thank you,      Perla Castañeda 91  [email or web address] c: [phone] / [address] [facility] / [place], [address] [facility]  [email or web address]. [facility]

## 2019-11-25 NOTE — PROGRESS NOTES
Subjective:   Daily Progress Note: 2019 9:05 AM  No Complaints  Objective:     Visit Vitals  /62   Pulse 97   Temp 100.3 °F (37.9 °C)   Resp 30   Ht 5' 6\" (1.676 m)   Wt 188 lb 7.9 oz (85.5 kg)   SpO2 96%   BMI 30.42 kg/m²    O2 Flow Rate (L/min): 2 l/min O2 Device: Nasal cannula    Temp (24hrs), Av.6 °F (37.6 °C), Min:98 °F (36.7 °C), Max:101.4 °F (38.6 °C)      1901 -  0700  In: 64327.8 [P.O.:660; I.V.:66484.8]  Out: 0825 [Urine:3175]  No intake/output data recorded.     [unfilled]  [unfilled]  [unfilled]    Exam:       Data Review    Recent Results (from the past 24 hour(s))   METABOLIC PANEL, BASIC    Collection Time: 19  9:45 AM   Result Value Ref Range    Sodium 142 136 - 145 mmol/L    Potassium 3.7 3.5 - 5.1 mmol/L    Chloride 113 (H) 98 - 107 mmol/L    CO2 20 (L) 21 - 32 mmol/L    Anion gap 9 7 - 16 mmol/L    Glucose 225 (H) 65 - 100 mg/dL    BUN 16 6 - 23 MG/DL    Creatinine 1.19 (H) 0.6 - 1.0 MG/DL    GFR est AA >60 >60 ml/min/1.73m2    GFR est non-AA 54 (L) >60 ml/min/1.73m2    Calcium 6.5 (L) 8.3 - 10.4 MG/DL   LACTIC ACID    Collection Time: 19  9:45 AM   Result Value Ref Range    Lactic acid 4.8 (HH) 0.4 - 2.0 MMOL/L   GLUCOSE, POC    Collection Time: 19 11:16 AM   Result Value Ref Range    Glucose (POC) 259 (H) 65 - 100 mg/dL   LACTIC ACID    Collection Time: 19  2:00 PM   Result Value Ref Range    Lactic acid 3.5 (HH) 0.4 - 2.0 MMOL/L   GLUCOSE, POC    Collection Time: 19  4:28 PM   Result Value Ref Range    Glucose (POC) 192 (H) 65 - 100 mg/dL   LACTIC ACID    Collection Time: 19  6:30 PM   Result Value Ref Range    Lactic acid 4.5 (HH) 0.4 - 2.0 MMOL/L   GLUCOSE, POC    Collection Time: 19 10:38 PM   Result Value Ref Range    Glucose (POC) 183 (H) 65 - 100 mg/dL   LACTIC ACID    Collection Time: 19 10:39 PM   Result Value Ref Range    Lactic acid 2.1 (HH) 0.4 - 2.0 MMOL/L   ALBUMIN    Collection Time: 19 10:39 PM Result Value Ref Range    Albumin 2.3 (L) 3.5 - 5.0 g/dL   LACTIC ACID    Collection Time: 11/25/19  3:08 AM   Result Value Ref Range    Lactic acid 1.8 0.4 - 2.0 MMOL/L   CBC W/O DIFF    Collection Time: 11/25/19  3:08 AM   Result Value Ref Range    WBC 13.3 (H) 4.3 - 11.1 K/uL    RBC 3.54 (L) 4.05 - 5.2 M/uL    HGB 11.0 (L) 11.7 - 15.4 g/dL    HCT 33.3 (L) 35.8 - 46.3 %    MCV 94.1 79.6 - 97.8 FL    MCH 31.1 26.1 - 32.9 PG    MCHC 33.0 31.4 - 35.0 g/dL    RDW 13.1 11.9 - 14.6 %    PLATELET 64 (L) 933 - 450 K/uL    MPV 10.7 9.4 - 12.3 FL    ABSOLUTE NRBC 0.00 0.0 - 0.2 K/uL   METABOLIC PANEL, COMPREHENSIVE    Collection Time: 11/25/19  3:08 AM   Result Value Ref Range    Sodium 142 136 - 145 mmol/L    Potassium 3.4 (L) 3.5 - 5.1 mmol/L    Chloride 112 (H) 98 - 107 mmol/L    CO2 25 21 - 32 mmol/L    Anion gap 5 (L) 7 - 16 mmol/L    Glucose 152 (H) 65 - 100 mg/dL    BUN 10 6 - 23 MG/DL    Creatinine 0.70 0.6 - 1.0 MG/DL    GFR est AA >60 >60 ml/min/1.73m2    GFR est non-AA >60 >60 ml/min/1.73m2    Calcium 6.7 (L) 8.3 - 10.4 MG/DL    Bilirubin, total 1.3 (H) 0.2 - 1.1 MG/DL    ALT (SGPT) 34 12 - 65 U/L    AST (SGOT) 24 15 - 37 U/L    Alk.  phosphatase 57 50 - 136 U/L    Protein, total 5.3 (L) 6.3 - 8.2 g/dL    Albumin 2.2 (L) 3.5 - 5.0 g/dL    Globulin 3.1 2.3 - 3.5 g/dL    A-G Ratio 0.7 (L) 1.2 - 3.5     CK    Collection Time: 11/25/19  3:08 AM   Result Value Ref Range     21 - 215 U/L   PROTHROMBIN TIME + INR    Collection Time: 11/25/19  5:15 AM   Result Value Ref Range    Prothrombin time 19.9 (H) 11.7 - 14.5 sec    INR 1.6     PTT    Collection Time: 11/25/19  5:15 AM   Result Value Ref Range    aPTT 39.0 24.7 - 39.8 SEC   FIBRINOGEN    Collection Time: 11/25/19  5:15 AM   Result Value Ref Range    Fibrinogen 485 190 - 501 mg/dL   GLUCOSE, POC    Collection Time: 11/25/19  7:25 AM   Result Value Ref Range    Glucose (POC) 148 (H) 65 - 100 mg/dL       Assessment   Principal Problem:    Septic shock ([address]- due to GNR (11/24/2019)    Active Problems:    Ureteral stone (11/23/2019)      Overview:        Date of Procedure: 11/24/2019       Preoperative Diagnosis: Right Ureteral Stone      Postoperative Diagnosis: Right Ureteral Stone        Procedure(s):      CYSTOSCOPY URETERAL STENT INSERTION      RHONDA (acute kidney injury) ([address] (11/24/2019)      Diabetes ([address] (11/24/2019)      Hydronephrosis due to obstruction of ureter (11/24/2019)      Thrombocytopenia ([address] (11/25/2019)      Lactic acidosis (11/25/2019)      Hypoproteinemia ([address] (11/25/2019)        Plan:  Blood cultures with GNR. BP is better, plan taper of Levophed.

## 2019-11-26 ENCOUNTER — APPOINTMENT (OUTPATIENT)
Dept: ULTRASOUND IMAGING | Age: 39
DRG: 853 | End: 2019-11-26
Attending: NURSE PRACTITIONER
Payer: SELF-PAY

## 2019-11-26 LAB
ACC. NO. FROM MICRO ORDER, ACCP: NORMAL
ALBUMIN SERPL-MCNC: 2.1 G/DL (ref 3.5–5)
ALBUMIN/GLOB SERPL: 0.7 {RATIO} (ref 1.2–3.5)
ALP SERPL-CCNC: 78 U/L (ref 50–136)
ALT SERPL-CCNC: 26 U/L (ref 12–65)
ANION GAP SERPL CALC-SCNC: 3 MMOL/L (ref 7–16)
AST SERPL-CCNC: 19 U/L (ref 15–37)
BACTERIA SPEC CULT: ABNORMAL
BILIRUB SERPL-MCNC: 0.8 MG/DL (ref 0.2–1.1)
BUN SERPL-MCNC: 9 MG/DL (ref 6–23)
CALCIUM SERPL-MCNC: 6.7 MG/DL (ref 8.3–10.4)
CHLORIDE SERPL-SCNC: 111 MMOL/L (ref 98–107)
CO2 SERPL-SCNC: 27 MMOL/L (ref 21–32)
CREAT SERPL-MCNC: 0.62 MG/DL (ref 0.6–1)
ERYTHROCYTE [DISTWIDTH] IN BLOOD BY AUTOMATED COUNT: 13.4 % (ref 11.9–14.6)
GLOBULIN SER CALC-MCNC: 3.1 G/DL (ref 2.3–3.5)
GLUCOSE BLD STRIP.AUTO-MCNC: 145 MG/DL (ref 65–100)
GLUCOSE BLD STRIP.AUTO-MCNC: 148 MG/DL (ref 65–100)
GLUCOSE BLD STRIP.AUTO-MCNC: 164 MG/DL (ref 65–100)
GLUCOSE BLD STRIP.AUTO-MCNC: 211 MG/DL (ref 65–100)
GLUCOSE SERPL-MCNC: 158 MG/DL (ref 65–100)
GRAM STN SPEC: ABNORMAL
HCT VFR BLD AUTO: 30.3 % (ref 35.8–46.3)
HGB BLD-MCNC: 9.9 G/DL (ref 11.7–15.4)
LACTATE SERPL-SCNC: 1.2 MMOL/L (ref 0.4–2)
LACTATE SERPL-SCNC: 1.7 MMOL/L (ref 0.4–2)
MAGNESIUM SERPL-MCNC: 1.7 MG/DL (ref 1.8–2.4)
MAGNESIUM SERPL-MCNC: 2.3 MG/DL (ref 1.8–2.4)
MCH RBC QN AUTO: 30.8 PG (ref 26.1–32.9)
MCHC RBC AUTO-ENTMCNC: 32.7 G/DL (ref 31.4–35)
MCV RBC AUTO: 94.4 FL (ref 79.6–97.8)
NRBC # BLD: 0 K/UL (ref 0–0.2)
PLATELET # BLD AUTO: 59 K/UL (ref 150–450)
PMV BLD AUTO: 11.3 FL (ref 9.4–12.3)
POTASSIUM SERPL-SCNC: 3.4 MMOL/L (ref 3.5–5.1)
POTASSIUM SERPL-SCNC: 4.2 MMOL/L (ref 3.5–5.1)
PROT SERPL-MCNC: 5.2 G/DL (ref 6.3–8.2)
RBC # BLD AUTO: 3.21 M/UL (ref 4.05–5.2)
SERVICE CMNT-IMP: ABNORMAL
SERVICE CMNT-IMP: ABNORMAL
SODIUM SERPL-SCNC: 141 MMOL/L (ref 136–145)
WBC # BLD AUTO: 13.6 K/UL (ref 4.3–11.1)

## 2019-11-26 PROCEDURE — 83605 ASSAY OF LACTIC ACID: CPT

## 2019-11-26 PROCEDURE — 74011250636 HC RX REV CODE- 250/636: Performed by: INTERNAL MEDICINE

## 2019-11-26 PROCEDURE — 85027 COMPLETE CBC AUTOMATED: CPT

## 2019-11-26 PROCEDURE — 36592 COLLECT BLOOD FROM PICC: CPT

## 2019-11-26 PROCEDURE — 83735 ASSAY OF MAGNESIUM: CPT

## 2019-11-26 PROCEDURE — 74011000258 HC RX REV CODE- 258: Performed by: INTERNAL MEDICINE

## 2019-11-26 PROCEDURE — 82962 GLUCOSE BLOOD TEST: CPT

## 2019-11-26 PROCEDURE — 74011250637 HC RX REV CODE- 250/637: Performed by: INTERNAL MEDICINE

## 2019-11-26 PROCEDURE — 84132 ASSAY OF SERUM POTASSIUM: CPT

## 2019-11-26 PROCEDURE — 77030020263 HC SOL INJ SOD CL0.9% LFCR 1000ML

## 2019-11-26 PROCEDURE — 74011636637 HC RX REV CODE- 636/637: Performed by: INTERNAL MEDICINE

## 2019-11-26 PROCEDURE — 99233 SBSQ HOSP IP/OBS HIGH 50: CPT | Performed by: INTERNAL MEDICINE

## 2019-11-26 PROCEDURE — 74011000250 HC RX REV CODE- 250: Performed by: INTERNAL MEDICINE

## 2019-11-26 PROCEDURE — 65270000029 HC RM PRIVATE

## 2019-11-26 PROCEDURE — 93971 EXTREMITY STUDY: CPT

## 2019-11-26 PROCEDURE — 74011250637 HC RX REV CODE- 250/637: Performed by: UROLOGY

## 2019-11-26 PROCEDURE — 80053 COMPREHEN METABOLIC PANEL: CPT

## 2019-11-26 RX ORDER — POTASSIUM CHLORIDE 14.9 MG/ML
20 INJECTION INTRAVENOUS
Status: COMPLETED | OUTPATIENT
Start: 2019-11-26 | End: 2019-11-26

## 2019-11-26 RX ORDER — FUROSEMIDE 10 MG/ML
40 INJECTION INTRAMUSCULAR; INTRAVENOUS ONCE
Status: COMPLETED | OUTPATIENT
Start: 2019-11-26 | End: 2019-11-26

## 2019-11-26 RX ORDER — MAGNESIUM SULFATE HEPTAHYDRATE 40 MG/ML
2 INJECTION, SOLUTION INTRAVENOUS ONCE
Status: COMPLETED | OUTPATIENT
Start: 2019-11-26 | End: 2019-11-26

## 2019-11-26 RX ADMIN — INSULIN LISPRO 2 UNITS: 100 INJECTION, SOLUTION INTRAVENOUS; SUBCUTANEOUS at 21:23

## 2019-11-26 RX ADMIN — MAGNESIUM SULFATE HEPTAHYDRATE 2 G: 40 INJECTION, SOLUTION INTRAVENOUS at 04:47

## 2019-11-26 RX ADMIN — ACETAMINOPHEN 650 MG: 325 TABLET, FILM COATED ORAL at 13:36

## 2019-11-26 RX ADMIN — HYDROCODONE BITARTRATE AND ACETAMINOPHEN 1 TABLET: 5; 325 TABLET ORAL at 18:07

## 2019-11-26 RX ADMIN — FUROSEMIDE 40 MG: 40 INJECTION, SOLUTION INTRAMUSCULAR; INTRAVENOUS at 13:36

## 2019-11-26 RX ADMIN — Medication 10 ML: at 21:27

## 2019-11-26 RX ADMIN — ACETAMINOPHEN 650 MG: 325 TABLET, FILM COATED ORAL at 00:39

## 2019-11-26 RX ADMIN — FAMOTIDINE 20 MG: 10 INJECTION INTRAVENOUS at 20:22

## 2019-11-26 RX ADMIN — HYDROMORPHONE HYDROCHLORIDE 0.5 MG: 1 INJECTION, SOLUTION INTRAMUSCULAR; INTRAVENOUS; SUBCUTANEOUS at 08:17

## 2019-11-26 RX ADMIN — PIPERACILLIN SODIUM AND TAZOBACTAM SODIUM 4.5 G: 4; .5 INJECTION, POWDER, LYOPHILIZED, FOR SOLUTION INTRAVENOUS at 02:30

## 2019-11-26 RX ADMIN — PIPERACILLIN SODIUM AND TAZOBACTAM SODIUM 4.5 G: 4; .5 INJECTION, POWDER, LYOPHILIZED, FOR SOLUTION INTRAVENOUS at 10:09

## 2019-11-26 RX ADMIN — ACETAMINOPHEN 650 MG: 325 TABLET, FILM COATED ORAL at 20:22

## 2019-11-26 RX ADMIN — HYDROCODONE BITARTRATE AND ACETAMINOPHEN 1 TABLET: 5; 325 TABLET ORAL at 11:13

## 2019-11-26 RX ADMIN — INSULIN LISPRO 4 UNITS: 100 INJECTION, SOLUTION INTRAVENOUS; SUBCUTANEOUS at 17:23

## 2019-11-26 RX ADMIN — POTASSIUM CHLORIDE 20 MEQ: 200 INJECTION, SOLUTION INTRAVENOUS at 04:54

## 2019-11-26 RX ADMIN — CEFTRIAXONE 2 G: 2 INJECTION, POWDER, FOR SOLUTION INTRAMUSCULAR; INTRAVENOUS at 18:09

## 2019-11-26 RX ADMIN — Medication 10 ML: at 06:07

## 2019-11-26 RX ADMIN — TAMSULOSIN HYDROCHLORIDE 0.4 MG: 0.4 CAPSULE ORAL at 08:16

## 2019-11-26 RX ADMIN — FAMOTIDINE 20 MG: 10 INJECTION INTRAVENOUS at 08:16

## 2019-11-26 RX ADMIN — SODIUM CHLORIDE 125 ML/HR: 900 INJECTION, SOLUTION INTRAVENOUS at 10:09

## 2019-11-26 RX ADMIN — KETOROLAC TROMETHAMINE 15 MG: 15 INJECTION, SOLUTION INTRAMUSCULAR; INTRAVENOUS at 05:27

## 2019-11-26 RX ADMIN — Medication 30 ML: at 06:06

## 2019-11-26 RX ADMIN — Medication 30 ML: at 21:24

## 2019-11-26 RX ADMIN — Medication 30 ML: at 13:37

## 2019-11-26 RX ADMIN — Medication 10 ML: at 21:23

## 2019-11-26 RX ADMIN — POTASSIUM CHLORIDE 20 MEQ: 200 INJECTION, SOLUTION INTRAVENOUS at 06:37

## 2019-11-26 NOTE — PROGRESS NOTES
CM met with pt and Tomas Stephens RN with 6th floor to assist pt and CM with translating, to complete assessment. Pt verified address, emergency contact and PCP. Per chart, pt is pending for Medicaid. Pt lives alone with 3 steps to enter into the home. Pt is able to complete ADL's such as bathing, cooking, cleaning, and driving. Pt confirmed no DME in the home. Pt has no difficulty obtaining medications in the community. Pt voiced no needs at this time. CM remains available if any needs shall arise. Care Management Interventions  PCP Verified by CM: Yes(Pt is seen at [facility] and states her MD is name Chata. )  Mode of Transport at Discharge:  Other (see comment)  Transition of Care Consult (CM Consult): Discharge Planning  Discharge Durable Medical Equipment: No  Physical Therapy Consult: Yes  Occupational Therapy Consult: Yes  Speech Therapy Consult: No  Current Support Network: Lives Alone  Confirm Follow Up Transport: Self  Plan discussed with Pt/Family/Caregiver: Yes  Freedom of Choice Offered: Yes  Hunt Resource Information Provided?: No  Discharge Location  Discharge Placement: Unable to determine at this time

## 2019-11-26 NOTE — PROGRESS NOTES
available on-site from 4:30 p.m. - 1:00 a.m. Please call [facility] at [phone] with any interpreting requests. Thank you,      Perla Byers 91  [email or web address] c: [phone] / [address] / [place], [address] [facility]  [email or web address]. [facility]

## 2019-11-26 NOTE — PROGRESS NOTES
Critical Care Outreach Nurse Progress Report:    Subjective: In to assess pt secondary to recent transfer from ICU. MEWS Score: 2 (11/26/19 1256)    Vitals:    11/26/19 1001 11/26/19 1101 11/26/19 1256 11/26/19 1330   BP: 107/68 117/66 126/77    Pulse: 80 91 (!) 101 97   Resp: 22 (!) 31 18    Temp:   (!) 100.6 °F (38.1 °C) 98.3 °F (36.8 °C)   SpO2: 97% 92% 93%    Weight:       Height:            Objective:  Patient lying in bed. Pain Intensity 1: 0 (11/26/19 0701)  Pain Location 1: Generalized  Pain Intervention(s) 1: Medication (see MAR)  Patient Stated Pain Goal: 0    Assessment: Patient alert, speaks only [place] Patient having pain in flank area. VSS. Plan: Will follow per outreach protocol.

## 2019-11-26 NOTE — PROGRESS NOTES
Subjective:   Daily Progress Note: 2019 10:59 AM  No Complaints. Levophed has been stopped. Pt feels weak. Josh is hurting her. Objective:     Visit Vitals  /68   Pulse 80   Temp 98.3 °F (36.8 °C)   Resp 22   Ht 5' 6\" (1.676 m)   Wt 209 lb 3.5 oz (94.9 kg)   SpO2 97%   BMI 33.77 kg/m²    O2 Flow Rate (L/min): 1 l/min O2 Device: Nasal cannula    Temp (24hrs), Av.4 °F (38 °C), Min:98.3 °F (36.8 °C), Max:103 °F (39.4 °C)       190 -  0700  In: 6889.8 [P.O.:210; I.V.:6679.8]  Out: 3101 [Urine:3100]  No intake/output data recorded.     [unfilled]  [unfilled]  [unfilled]    Exam:       Data Review    Recent Results (from the past 24 hour(s))   GLUCOSE, POC    Collection Time: 19 11:20 AM   Result Value Ref Range    Glucose (POC) 171 (H) 65 - 100 mg/dL   GLUCOSE, POC    Collection Time: 19  4:12 PM   Result Value Ref Range    Glucose (POC) 182 (H) 65 - 100 mg/dL   LACTIC ACID    Collection Time: 19  4:17 PM   Result Value Ref Range    Lactic acid 1.9 0.4 - 2.0 MMOL/L   POTASSIUM    Collection Time: 19  4:17 PM   Result Value Ref Range    Potassium 3.7 3.5 - 5.1 mmol/L   GLUCOSE, POC    Collection Time: 19  9:17 PM   Result Value Ref Range    Glucose (POC) 164 (H) 65 - 100 mg/dL   LACTIC ACID    Collection Time: 19  3:26 AM   Result Value Ref Range    Lactic acid 1.2 0.4 - 2.0 MMOL/L   CBC W/O DIFF    Collection Time: 19  3:26 AM   Result Value Ref Range    WBC 13.6 (H) 4.3 - 11.1 K/uL    RBC 3.21 (L) 4.05 - 5.2 M/uL    HGB 9.9 (L) 11.7 - 15.4 g/dL    HCT 30.3 (L) 35.8 - 46.3 %    MCV 94.4 79.6 - 97.8 FL    MCH 30.8 26.1 - 32.9 PG    MCHC 32.7 31.4 - 35.0 g/dL    RDW 13.4 11.9 - 14.6 %    PLATELET 59 (L) 561 - 450 K/uL    MPV 11.3 9.4 - 12.3 FL    ABSOLUTE NRBC 0.00 0.0 - 0.2 K/uL   METABOLIC PANEL, COMPREHENSIVE    Collection Time: 19  3:26 AM   Result Value Ref Range    Sodium 141 136 - 145 mmol/L    Potassium 3.4 (L) 3.5 - 5.1 mmol/L Chloride 111 (H) 98 - 107 mmol/L    CO2 27 21 - 32 mmol/L    Anion gap 3 (L) 7 - 16 mmol/L    Glucose 158 (H) 65 - 100 mg/dL    BUN 9 6 - 23 MG/DL    Creatinine 0.62 0.6 - 1.0 MG/DL    GFR est AA >60 >60 ml/min/1.73m2    GFR est non-AA >60 >60 ml/min/1.73m2    Calcium 6.7 (L) 8.3 - 10.4 MG/DL    Bilirubin, total 0.8 0.2 - 1.1 MG/DL    ALT (SGPT) 26 12 - 65 U/L    AST (SGOT) 19 15 - 37 U/L    Alk. phosphatase 78 50 - 136 U/L    Protein, total 5.2 (L) 6.3 - 8.2 g/dL    Albumin 2.1 (L) 3.5 - 5.0 g/dL    Globulin 3.1 2.3 - 3.5 g/dL    A-G Ratio 0.7 (L) 1.2 - 3.5     MAGNESIUM    Collection Time: 11/26/19  3:26 AM   Result Value Ref Range    Magnesium 1.7 (L) 1.8 - 2.4 mg/dL   GLUCOSE, POC    Collection Time: 11/26/19  7:21 AM   Result Value Ref Range    Glucose (POC) 148 (H) 65 - 100 mg/dL   POTASSIUM    Collection Time: 11/26/19 10:08 AM   Result Value Ref Range    Potassium 4.2 3.5 - 5.1 mmol/L   MAGNESIUM    Collection Time: 11/26/19 10:08 AM   Result Value Ref Range    Magnesium 2.3 1.8 - 2.4 mg/dL       Assessment   Principal Problem:    Septic shock ([address]- due to GNR (11/24/2019)    Active Problems:    Ureteral stone (11/23/2019)      Overview:        Date of Procedure: 11/24/2019       Preoperative Diagnosis: Right Ureteral Stone      Postoperative Diagnosis: Right Ureteral Stone        Procedure(s):      CYSTOSCOPY URETERAL STENT INSERTION      RHONDA (acute kidney injury) ([address] (11/24/2019)      Diabetes ([address] (11/24/2019)      Hydronephrosis due to obstruction of ureter (11/24/2019)      Thrombocytopenia ([address] (11/25/2019)      Lactic acidosis (11/25/2019)      Hypoproteinemia ([facility] (11/25/2019)        Plan:  Blood culture shows Proteus , sensitive to Rocephin. Will transfer to floor. Remove haney. Continue Rocephin.

## 2019-11-26 NOTE — PROGRESS NOTES
rounded on patient with nurse. Interpreting services offered when needed. Thank you,      Perla Morales 91  [email or web address] c: [phone] / [address] [facility] / [place], [address] [facility]  [email or web address]

## 2019-11-26 NOTE — PROGRESS NOTES
TRANSFER - OUT REPORT:    Verbal report given to ALEXEY Duncan(name) on An  being transferred to [facility](unit) for routine progression of care       Report consisted of patients Situation, Background, Assessment and   Recommendations(SBAR). Information from the following report(s) SBAR, Kardex, Procedure Summary, Intake/Output, Recent Results and Cardiac Rhythm nsr was reviewed with the receiving nurse. Lines:   PICC Triple Lumen 35/65/59 Right;Basilic (Active)   Central Line Being Utilized Yes 11/26/2019  7:01 AM   Criteria for Appropriate Use Hemodynamically unstable, requiring monitoring lines, vasopressors, or volume resuscitation 11/26/2019  7:01 AM   Site Assessment Clean, dry, & intact 11/26/2019  7:01 AM   Phlebitis Assessment 0 11/26/2019  7:01 AM   Infiltration Assessment 0 11/26/2019  7:01 AM   Arm Circumference (cm) 30 cm 11/24/2019 11:45 AM   Date of Last Dressing Change 11/24/19 11/26/2019  7:01 AM   Dressing Status Clean, dry, & intact 11/26/2019  7:01 AM   External Catheter Length (cm) 0 centimeters 11/24/2019 12:30 PM   Dressing Type Disk with Chlorhexadine gluconate (CHG); Transparent;Stabilization/securement device 11/26/2019  7:01 AM   Action Taken Other (comment) 11/25/2019  9:51 AM   Hub Color/Line Status Gray;Patent; Infusing 11/26/2019  7:01 AM   Positive Blood Return (Site #1) Yes 11/26/2019  7:01 AM   Hub Color/Line Status Red; Infusing;Patent 11/26/2019  7:01 AM   Positive Blood Return (Site #2) Yes 11/26/2019  7:01 AM   Hub Color/Line Status White; Infusing;Patent 11/26/2019  7:01 AM   Positive Blood Return (Site #3) Yes 11/26/2019  7:01 AM   Alcohol Cap Used No 11/25/2019  7:01 PM        Opportunity for questions and clarification was provided.       Patient transported with:   O2 @ 2 liters

## 2019-11-26 NOTE — PROGRESS NOTES
Critical Care Daily Progress Note: 11/26/2019    [place]   Admission Date: 11/23/2019         The patient's chart is reviewed and the patient is discussed with the staff.    43 y.o.  female evaluated at the request of Dr. Rigo Talamantes as per the ICU [facility] protocol. The history is obtained through an  and review of records.   Machelle Lee has a hx of DM and developed acute R flank/groin pain yesterday associated with fever and N/V. She went to [facility] but left after not being seen quickly enough. She presented here with severe hypotension and distress. She was febrile to 103.8, cultured and Zosyn started. A right hydronephrosis was diagnosed and was emergently taken to the OR for cystoscopy with R ureteral stent placement. She has returned to the ICU and was hypotensive. She had received 3L NS prior to her ICU arrival and she remains hypotensive. Levophed has been started. She is making urine and she does not know what her BP usually runs. She has a lot of myalgias and also R shoulder pain. She has no history of lung or heart problems.   Was weaned off Levophed but had to be restarted. Subjective:     Now weaned back off Levophed. Received Dilaudid for RLQ pain. Per  states she feels some shortness of breath at time and with rare productive cough.     Current Facility-Administered Medications   Medication Dose Route Frequency    tamsulosin (FLOMAX) capsule 0.4 mg  0.4 mg Oral DAILY    piperacillin-tazobactam (ZOSYN) 4.5 g in 0.9% sodium chloride (MBP/ADV) 100 mL  4.5 g IntraVENous Q8H    acetaminophen (TYLENOL) tablet 650 mg  650 mg Oral Q4H PRN    magic mouthwash (CLAUDIO) oral suspension 10 mL  10 mL Swish and Spit TID    NOREPINephrine (LEVOPHED) 4 mg in 5% dextrose 250 mL infusion  0.5-16 mcg/min IntraVENous TITRATE    sodium chloride (NS) flush 5-40 mL  5-40 mL IntraVENous Q8H    sodium chloride (NS) flush 5-40 mL  5-40 mL IntraVENous PRN    NUTRITIONAL SUPPORT ELECTROLYTE PRN ORDERS   Does Not Apply PRN    naloxone (NARCAN) injection 0.4 mg  0.4 mg IntraVENous PRN    dextrose 40% (GLUTOSE) oral gel 1 Tube  15 g Oral PRN    glucagon (GLUCAGEN) injection 1 mg  1 mg IntraMUSCular PRN    dextrose (D50W) injection syrg 12.5-25 g  25-50 mL IntraVENous PRN    HYDROmorphone (PF) (DILAUDID) injection 0.5 mg  0.5 mg IntraVENous Q4H PRN    insulin lispro (HUMALOG) injection 0-10 Units  0-10 Units SubCUTAneous AC&HS    ketorolac (TORADOL) injection 15 mg  15 mg IntraVENous Q8H PRN    famotidine (PF) (PEPCID) 20 mg in sodium chloride 0.9% 10 mL injection  20 mg IntraVENous Q12H    sodium chloride (NS) flush 30 mL  30 mL InterCATHeter Q8H    sodium chloride (NS) flush 30 mL  30 mL InterCATHeter PRN    heparin (porcine) pf 900 Units  900 Units InterCATHeter PRN    oxybutynin (DITROPAN) tablet 5 mg  5 mg Oral Q6H PRN    LORazepam (ATIVAN) injection 1 mg  1 mg IntraVENous Q6H PRN    0.9% sodium chloride infusion  125 mL/hr IntraVENous CONTINUOUS    HYDROcodone-acetaminophen (NORCO) 5-325 mg per tablet 1 Tab  1 Tab Oral Q4H PRN    ondansetron (ZOFRAN) injection 4 mg  4 mg IntraVENous Q4H PRN    sodium chloride (NS) flush 5-10 mL  5-10 mL IntraVENous PRN    diphenhydrAMINE (BENADRYL) injection 12.5 mg  12.5 mg IntraVENous Q4H PRN       Review of Systems  Constitutional:  negative for fever, chills, sweats  Cardiovascular:  Mild UE and LE edema, negative for chest pain, palpitations, syncope  Gastrointestinal:  negative for dysphagia, reflux, vomiting, diarrhea, abdominal pain, or melena  Neurologic:  negative for focal weakness, numbness, headache      Objective:     Vitals:    11/26/19 0501 11/26/19 0601 11/26/19 0609 11/26/19 0701   BP: 106/70 103/58  104/67   Pulse: 90 87  84   Resp: 29 26 19   Temp:    98.3 °F (36.8 °C)   SpO2: 97% 96%  97%   Weight:   209 lb 3.5 oz (94.9 kg)    Height:             Intake/Output Summary (Last 24 hours) at 11/26/2019 6791  Last data filed at 11/26/2019 8961  Gross per 24 hour   Intake 3927 ml   Output 1701 ml   Net 2226 ml       Physical Exam:          Constitutional:  the patient is obese and in no acute distress, NC 2L sat 97%  EENMT:  Sclera clear, pupils equal, oral mucosa moist  Respiratory: rare anterior crackles, no wheezing  Cardiovascular:  RRR without M,G,R  Gastrointestinal: soft and non-tender; with positive bowel sounds. Musculoskeletal: warm without cyanosis. There is trace up[per and lower extremity edema. Skin:  no jaundice or rashes, no wounds   Neurologic: no gross neuro deficits     Psychiatric:  alert and oriented x 3    LINES:  Right PICC 11/24/19  [place] 11/24/19    DRIPS:   ml/hr    CXR:   11/24/19:  Prominent interstitial lung markings are unchanged, which may relate  to interstitial edema or atypical pneumonia. There is no focal infiltrate. LAB  Recent Labs     11/26/19  0721 11/25/19  2117 11/25/19  1612 11/25/19  1120 11/25/19  0725   GLUCPOC 148* 164* 182* 171* 148*      Recent Labs     11/26/19  0326 11/25/19  0515 11/25/19  0308 11/23/19  2338   WBC 13.6*  --  13.3* 4.6   HGB 9.9*  --  11.0* 13.0   HCT 30.3*  --  33.3* 39.3   PLT 59*  --  64* 136*   INR  --  1.6  --   --      Recent Labs     11/26/19  0326 11/25/19  1617 11/25/19  0308 11/24/19  2239 11/24/19  0945 11/24/19  0656     --  142  --  142 143   K 3.4* 3.7 3.4*  --  3.7 3.3*   *  --  112*  --  113* 113*   CO2 27  --  25  --  20* 19*   *  --  152*  --  225* 213*   BUN 9  --  10  --  16 16   CREA 0.62  --  0.70  --  1.19* 1.43*   MG 1.7*  --   --   --   --   --    CA 6.7*  --  6.7*  --  6.5* 6.5*   ALB 2.1*  --  2.2* 2.3*  --  2.1*   TBILI 0.8  --  1.3*  --   --  0.7   ALT 26  --  34  --   --  35   SGOT 19  --  24  --   --  23     No results for input(s): PH, PCO2, PO2, HCO3, PHI, PCO2I, PO2I, HCO3I in the last 72 hours.   Recent Labs     11/26/19  0326 11/25/19  1617 11/25/19  0308   LAC 1.2 1.9 1.8     No results for input(s): PH, PCO2, PO2, HCO3 in the last 72 hours. Assessment:  (Medical Decision Making)     Hospital Problems  Date Reviewed: 11/26/2019          Codes Class Noted POA    Thrombocytopenia ([facility] ICD-10-CM: D69.6  ICD-9-CM: 287.5  11/25/2019 Yes    Platelets remian low 59    Lactic acidosis ICD-10-CM: E87.2  ICD-9-CM: 276.2  11/25/2019 Yes    LA down to 1.2    Hypoproteinemia ([facility] ICD-10-CM: E77.8  ICD-9-CM: 273.8  11/25/2019 Yes    Albumin 2.1    * (Principal) Septic shock ([facility]- due to GNR ICD-10-CM: A41.9, R65.21  ICD-9-CM: 038.9, 785.52, 995.92  11/24/2019 Yes    Off pressor    RHONDA (acute kidney injury) ([facility] ICD-10-CM: N17.9  ICD-9-CM: 584.9  11/24/2019 Yes    Resolved--creat 0.62    Diabetes ([facility] ICD-10-CM: E11.9  ICD-9-CM: 250.00  11/24/2019 Yes    Chronic--ranges 148-182    Hydronephrosis due to obstruction of ureter ICD-10-CM: N13.2  ICD-9-CM: 682, 593.4  11/24/2019 Yes    Per urology    Ureteral stone ICD-10-CM: N20.1  ICD-9-CM: 592.1  11/23/2019 Yes    Overview Signed 11/24/2019  1:07 PM by Veronica Bello MD        Date of Procedure: 11/24/2019   Preoperative Diagnosis: Right Ureteral Stone  Postoperative Diagnosis: Right Ureteral Stone    Procedure(s):  CYSTOSCOPY URETERAL STENT INSERTION         Per urology      Hypoxia:  With evidence of pulmonary edema. Plan:  (Medical Decision Making)     --Zosyn day 3  --Urine culture 11/24:  GNR-pending  --Blood cultures 11/25:  No growth 1 day-pending  --WBC 13.6, platelets remains low 59  --Currently 12 L+ from volume standpoint. Consider diuresis or decreased IVFs? ? --OK to move to the floor from pulmonary standpoint. Wean O2 as tolerated  --Consult PT/OT for mobility  --Complained to PICC team or right upper arm discomfort and swelling at site--ordering ultrasound to rule out DVT.     More than 50% of the time documented was spent in face-to-face contact with the patient and in the care of the patient on the floor/unit where the patient is located. Austin Mata NP    I have spoken with and examined the patient. I agree with the above assessment and plan as documented. Gen:  Pleasant, though in pain  Lungs:  Wheezes bilaterally  Heart:  RRR with no Murmur/Rubs/Gallops  Abd: NABS, NTND  Ext: + edema    -- suspect pulmonary edema ongoing and have decreased IVF and ordered lasix x 1  -- Consider d/c IV fluids soon.   -- f/u ultrasound  -- will follow up on floor and hopefully can wean Dat Mai MD

## 2019-11-26 NOTE — PROGRESS NOTES
TRANSFER - IN REPORT:    Verbal report received from ALEXEY Butts(name) on [place]  being received from ICU(unit) for routine progression of care      Report consisted of patients Situation, Background, Assessment and   Recommendations(SBAR). Information from the following report(s) SBAR was reviewed with the receiving nurse. Opportunity for questions and clarification was provided. Assessment completed upon patients arrival to unit and care assumed.

## 2019-11-26 NOTE — PROGRESS NOTES
Assessed pt's PICC line today and noted that pt's FA is swollen more than yesterday. Pt starting to have pain in that right axillary. Primary RN notified and Vikram Barnes NP was notified. Orders for U/S obtained.

## 2019-11-26 NOTE — PROGRESS NOTES
Progress Note    Patient: Tadeo Francisco MRN: [id number]  SSN: [id number]    YOB: 1980  Age: 43 y.o. Sex: female      Admit Date: 11/23/2019    LOS: 3 days     Subjective:     PMH of DM type 2,    Admitted due to sepsis with UTI. Found to have obstructive uropathy with renal stones in the right ureter with right hydro-nephrosis. Patient had septic shock and was treated with vasopressor. Now off vasopressor. S/P cystoscopy and stent placement in right ureter on 11-. Patient is feeling better. Blood culture grew GNR. No fever. No shaking. No chills. No shortness of breath. No chest pain. Objective:     Vitals:    11/26/19 0609 11/26/19 0701 11/26/19 0801 11/26/19 0901   BP:  104/67 105/72 97/65   Pulse:  84 90 83   Resp:  19 27 25   Temp:  98.3 °F (36.8 °C)     SpO2:  97% 95% 94%   Weight: 94.9 kg (209 lb 3.5 oz)      Height:            Intake and Output:  Current Shift: No intake/output data recorded. Last three shifts: 11/24 1901 - 11/26 0700  In: 6889.8 [P.O.:210; I.V.:6679.8]  Out: 3101 [Urine:3100]    Physical Exam:     General:                    The patient is a female in no acute distress. She is lying flat in bed. Head:                                   Normocephalic/atraumatic. Eyes:                                   No palpebral pallor or scleral icterus. ENT:                                    External auricular and nasal exam within normal limits. Mucous membranes are moist.  Neck:                                   Supple, non-tender, no JVD. Lungs:                       Clear to auscultation bilaterally without wheezes or crackles. No respiratory distress or accessory muscle use. Heart:                                  Regular rate and rhythm, without murmurs, rubs, or gallops.   Abdomen:                  Soft, non-tender, distended due to truncal obesity with normoactive bowel sounds. Genitourinary:           No tenderness over the bladder or bilateral CVAs. Extremities:               Without clubbing, cyanosis, or edema. Skin:                                    Normal color, texture, and turgor. No rashes, lesions, or jaundice. Pulses:                      Radial and dorsalis pedis pulses present 2+ bilaterally. Capillary refill <2s. Neurologic:                CN II-XII grossly intact and symmetrical.                                               Moving all four extremities well with no focal deficits. Psychiatric:                Pleasant demeanor, appropriate affect.  Alert and oriented x 3      Lab/Data Review:    Recent Results (from the past 24 hour(s))   GLUCOSE, POC    Collection Time: 11/25/19 11:20 AM   Result Value Ref Range    Glucose (POC) 171 (H) 65 - 100 mg/dL   GLUCOSE, POC    Collection Time: 11/25/19  4:12 PM   Result Value Ref Range    Glucose (POC) 182 (H) 65 - 100 mg/dL   LACTIC ACID    Collection Time: 11/25/19  4:17 PM   Result Value Ref Range    Lactic acid 1.9 0.4 - 2.0 MMOL/L   POTASSIUM    Collection Time: 11/25/19  4:17 PM   Result Value Ref Range    Potassium 3.7 3.5 - 5.1 mmol/L   GLUCOSE, POC    Collection Time: 11/25/19  9:17 PM   Result Value Ref Range    Glucose (POC) 164 (H) 65 - 100 mg/dL   LACTIC ACID    Collection Time: 11/26/19  3:26 AM   Result Value Ref Range    Lactic acid 1.2 0.4 - 2.0 MMOL/L   CBC W/O DIFF    Collection Time: 11/26/19  3:26 AM   Result Value Ref Range    WBC 13.6 (H) 4.3 - 11.1 K/uL    RBC 3.21 (L) 4.05 - 5.2 M/uL    HGB 9.9 (L) 11.7 - 15.4 g/dL    HCT 30.3 (L) 35.8 - 46.3 %    MCV 94.4 79.6 - 97.8 FL    MCH 30.8 26.1 - 32.9 PG    MCHC 32.7 31.4 - 35.0 g/dL    RDW 13.4 11.9 - 14.6 %    PLATELET 59 (L) 905 - 450 K/uL    MPV 11.3 9.4 - 12.3 FL    ABSOLUTE NRBC 0.00 0.0 - 0.2 K/uL   METABOLIC PANEL, COMPREHENSIVE    Collection Time: 11/26/19 3:26 AM   Result Value Ref Range    Sodium 141 136 - 145 mmol/L    Potassium 3.4 (L) 3.5 - 5.1 mmol/L    Chloride 111 (H) 98 - 107 mmol/L    CO2 27 21 - 32 mmol/L    Anion gap 3 (L) 7 - 16 mmol/L    Glucose 158 (H) 65 - 100 mg/dL    BUN 9 6 - 23 MG/DL    Creatinine 0.62 0.6 - 1.0 MG/DL    GFR est AA >60 >60 ml/min/1.73m2    GFR est non-AA >60 >60 ml/min/1.73m2    Calcium 6.7 (L) 8.3 - 10.4 MG/DL    Bilirubin, total 0.8 0.2 - 1.1 MG/DL    ALT (SGPT) 26 12 - 65 U/L    AST (SGOT) 19 15 - 37 U/L    Alk.  phosphatase 78 50 - 136 U/L    Protein, total 5.2 (L) 6.3 - 8.2 g/dL    Albumin 2.1 (L) 3.5 - 5.0 g/dL    Globulin 3.1 2.3 - 3.5 g/dL    A-G Ratio 0.7 (L) 1.2 - 3.5     MAGNESIUM    Collection Time: 11/26/19  3:26 AM   Result Value Ref Range    Magnesium 1.7 (L) 1.8 - 2.4 mg/dL   GLUCOSE, POC    Collection Time: 11/26/19  7:21 AM   Result Value Ref Range    Glucose (POC) 148 (H) 65 - 100 mg/dL     Results     Procedure Component Value Units Date/Time    CULTURE, BLOOD [[id number]] Collected:  11/25/19 0511    Order Status:  Completed Specimen:  Blood Updated:  11/26/19 0810     Special Requests: --        LEFT  Antecubital       Culture result: NO GROWTH 1 DAY       CULTURE, BLOOD [[id number]] Collected:  11/25/19 0511    Order Status:  Completed Specimen:  Blood Updated:  11/26/19 0810     Special Requests: --        LEFT  HAND       Culture result: NO GROWTH 1 DAY       CULTURE, URINE [[id number]]  (Abnormal) Collected:  11/24/19 0302    Order Status:  Completed Specimen:  Urine from Clean catch Updated:  11/26/19 0643     Special Requests: NO SPECIAL REQUESTS        Culture result:       00178 COLONIES/mL GRAM NEGATIVE RODS IDENTIFICATION AND SUSCEPTIBILITY TO FOLLOW                  10,000 to 50,000 COLONIES/mL MIXED SKIN DAVE ISOLATED          CULTURE, BLOOD [[id number]]  (Abnormal)  (Susceptibility) Collected:  11/23/19 9510    Order Status:  Completed Specimen:  Blood Updated:  11/26/19 0677     Special Requests: -- LEFT  Antecubital       GRAM STAIN GRAM NEGATIVE RODS         PEDIATRIC BOTTLE               RESULTS VERIFIED, PHONED TO AND READ BACK BY Fiona Ernandez RN @ 0872 ON 11/24/19 AK. Culture result: PROTEUS MIRABILIS         REFER TO BLOOD CULTURE ID PANEL [id number]    Susceptibility      Proteus mirabilis     YARON     Aztreonam ($$$$) Susceptible     Cefazolin ($) Susceptible     Cefepime ($$) Susceptible     Cefoxitin Susceptible     Ceftriaxone ($) Susceptible     Ciprofloxacin ($) Susceptible     Gentamicin ($) Susceptible     Levofloxacin ($) Susceptible     Piperacillin/Tazobac ($) Susceptible     Tobramycin ($) Susceptible     Trimeth-Sulfamethoxa Resistant                    CULTURE, BLOOD [[id number]]  (Abnormal) Collected:  11/23/19 2240    Order Status:  Completed Specimen:  Blood Updated:  11/26/19 0653     Special Requests: --        LEFT  HAND       GRAM STAIN GRAM NEGATIVE RODS         PEDIATRIC BOTTLE               RESULTS VERIFIED, PHONED TO AND READ BACK BY Fiona Ernandez RN @ 9899 ON 11/24/19 AK. Culture result: GRAM NEGATIVE RODS         REFER TO CULTURE  ACC NO. [id number] FOR ID AND SUSCEPTIBILITY    BLOOD CULTURE ID PANEL [[id number]] Collected:  11/23/19 2240    Order Status:  Completed Specimen:  Blood Updated:  11/26/19 0658     Acc. no. from Micro Order [id number]    WET PREP [[id number]]     Order Status:  Sent Specimen:  Vagina         CT abdomen and pelvis  11-  IMPRESSION:      1. Acute mild right hydronephrosis caused by 2 small partially obstructing  calculi in the distal right ureter just proximal to the UVJ. 2.  Other chronic findings as above. XR chest   11-  IMPRESSION: Prominent interstitial lung markings are unchanged, which may relate  to interstitial edema or atypical pneumonia. There is no focal infiltrate.       Current Facility-Administered Medications:     tamsulosin (FLOMAX) capsule 0.4 mg, 0.4 mg, Oral, DAILY, Neelima Alvarado MD, 0.4 mg at 11/26/19 0816    piperacillin-tazobactam (ZOSYN) 4.5 g in 0.9% sodium chloride (MBP/ADV) 100 mL, 4.5 g, IntraVENous, Q8H, Marcin Bob MD, Last Rate: 25 mL/hr at 11/26/19 1009, 4.5 g at 11/26/19 1009    acetaminophen (TYLENOL) tablet 650 mg, 650 mg, Oral, Q4H PRN, Dina Kemp MD, 650 mg at 11/26/19 0039    magic mouthwash (CLAUDIO) oral suspension 10 mL, 10 mL, Swish and Spit, TID, Kelby Pitts MD, 10 mL at 11/25/19 2200    NOREPINephrine (LEVOPHED) 4 mg in 5% dextrose 250 mL infusion, 0.5-16 mcg/min, IntraVENous, TITRATE, Porfirio Trejo MD, Stopped at 11/25/19 1138    sodium chloride (NS) flush 5-40 mL, 5-40 mL, IntraVENous, Q8H, Porfirio Trejo MD, 10 mL at 11/26/19 0607    sodium chloride (NS) flush 5-40 mL, 5-40 mL, IntraVENous, PRN, Mauricioines Jay, Shara Farias MD    NUTRITIONAL SUPPORT ELECTROLYTE PRN ORDERS, , Does Not Apply, PRN, Porfirio Trejo MD    naloxone [facility]) injection 0.4 mg, 0.4 mg, IntraVENous, PRN, Porfirio Trejo MD    dextrose 40% (GLUTOSE) oral gel 1 Tube, 15 g, Oral, PRN, Porfirio Trejo MD    glucagon (GLUCAGEN) injection 1 mg, 1 mg, IntraMUSCular, PRN, Porfirio Trejo MD    dextrose (D50W) injection syrg 12.5-25 g, 25-50 mL, IntraVENous, PRN, Porfirio Trejo MD    HYDROmorphone (PF) (DILAUDID) injection 0.5 mg, 0.5 mg, IntraVENous, Q4H PRN, Marcin Bob MD, 0.5 mg at 11/26/19 0817    insulin lispro (HUMALOG) injection 0-10 Units, 0-10 Units, SubCUTAneous, AC&HS, Marcin Bob MD, Stopped at 11/26/19 0730    ketorolac (TORADOL) injection 15 mg, 15 mg, IntraVENous, Q8H PRN, Vinny Pitts MD, 15 mg at 11/26/19 0527    famotidine (PF) (PEPCID) 20 mg in sodium chloride 0.9% 10 mL injection, 20 mg, IntraVENous, Q12H, Vinny Pitts MD, 20 mg at 11/26/19 0816    sodium chloride (NS) flush 30 mL, 30 mL, InterCATHeter, Q8H, Vinny Pitts MD, 30 mL at 11/26/19 0606    sodium chloride (NS) flush 30 mL, 30 mL, InterCATHeter, PRN, Tanya Albarran MD    heparin (porcine) pf 900 Units, 900 Units, InterCATHeter, PRN, Tanya Albarran MD    oxybutynin [facility]) tablet 5 mg, 5 mg, Oral, Q6H PRN, Ewa Juarez MD, 5 mg at 11/23/19 2058    LORazepam (ATIVAN) injection 1 mg, 1 mg, IntraVENous, Q6H PRN, Ewa Juarez MD    0.9% sodium chloride infusion, 125 mL/hr, IntraVENous, CONTINUOUS, Tanya Albarran MD, Last Rate: 125 mL/hr at 11/26/19 1009, 125 mL/hr at 11/26/19 1009    HYDROcodone-acetaminophen (NORCO) 5-325 mg per tablet 1 Tab, 1 Tab, Oral, Q4H PRN, Ewa Juarez MD, 1 Tab at 11/24/19 2115    ondansetron (ZOFRAN) injection 4 mg, 4 mg, IntraVENous, Q4H PRN, Ewa Juarez MD, 4 mg at 11/25/19 2045    sodium chloride (NS) flush 5-10 mL, 5-10 mL, IntraVENous, PRN, Ewa Juarez MD    diphenhydrAMINE (BENADRYL) injection 12.5 mg, 12.5 mg, IntraVENous, Q4H PRN, Ewa Juarez MD      Assessment:     Principal Problem:    Septic shock ([address]- due to GNR (11/24/2019)    Active Problems:    Ureteral stone (11/23/2019)      Overview:        Date of Procedure: 11/24/2019       Preoperative Diagnosis: Right Ureteral Stone      Postoperative Diagnosis: Right Ureteral Stone        Procedure(s):      CYSTOSCOPY URETERAL STENT INSERTION      RHONDA (acute kidney injury) ([address] (11/24/2019)      Diabetes ([address] (11/24/2019)      Hydronephrosis due to obstruction of ureter (11/24/2019)      Thrombocytopenia ([address] (11/25/2019)      Lactic acidosis (11/25/2019)      Hypoproteinemia ([address] (11/25/2019)        Plan:     Septic shock from UTI and obstructive nephropathy   Improved. Urine culture grew Proteus mirabilis sensitive to Ceftriaxone. Will change antibiotic. Monitor. RHONDA   From septic shock  Improved. Monitor renal function and intake and output. Avoid nephrotoxic agents. OK to move to medical floor from my opinion.      Signed By: Jessica Paige MD     November 26, 2019

## 2019-11-26 NOTE — PROGRESS NOTES
present for assessment by , Janis Hagen.         Kory Henderson RN, [facility]/ Registered Nurse 6th floor,   Patient Relations & Interpreting Services  c: [email or web address]

## 2019-11-26 NOTE — PROGRESS NOTES
Physical Therapy Note:    Participated in interdisciplinary rounds in ICU/CCU and chart reviewed. Patient is experiencing decrease in function from baseline. Patient would benefit from skilled acute therapy to increase independence with self care/ADLs, strength, endurance, and functional mobility. Recommend PT/OT consult when medically stable and MD agrees.     Thank you for your consideration,  Hany Edmonds, PT, DPT

## 2019-11-26 NOTE — INTERDISCIPLINARY ROUNDS
Interdisciplinary team rounds were held 11/26/2019 with the following team members:Nursing, Nurse Practitioner, Nutrition, Occupational Therapy, Palliative Care, Pastoral Care, Pharmacy, Physical Therapy, Physician, Respiratory Therapy and Clinical Coordinator and the patient. Plan of care discussed. See clinical pathway and/or care plan for interventions and desired outcomes.

## 2019-11-27 LAB
BACTERIA SPEC CULT: ABNORMAL
BACTERIA SPEC CULT: ABNORMAL
GLUCOSE BLD STRIP.AUTO-MCNC: 140 MG/DL (ref 65–100)
GLUCOSE BLD STRIP.AUTO-MCNC: 152 MG/DL (ref 65–100)
GLUCOSE BLD STRIP.AUTO-MCNC: 155 MG/DL (ref 65–100)
GLUCOSE BLD STRIP.AUTO-MCNC: 164 MG/DL (ref 65–100)
LACTATE SERPL-SCNC: 1 MMOL/L (ref 0.4–2)
LACTATE SERPL-SCNC: 1.2 MMOL/L (ref 0.4–2)
SERVICE CMNT-IMP: ABNORMAL

## 2019-11-27 PROCEDURE — 74011250637 HC RX REV CODE- 250/637: Performed by: UROLOGY

## 2019-11-27 PROCEDURE — 74011250637 HC RX REV CODE- 250/637: Performed by: INTERNAL MEDICINE

## 2019-11-27 PROCEDURE — 83605 ASSAY OF LACTIC ACID: CPT

## 2019-11-27 PROCEDURE — 97161 PT EVAL LOW COMPLEX 20 MIN: CPT

## 2019-11-27 PROCEDURE — 74011000250 HC RX REV CODE- 250: Performed by: INTERNAL MEDICINE

## 2019-11-27 PROCEDURE — 74011250636 HC RX REV CODE- 250/636: Performed by: INTERNAL MEDICINE

## 2019-11-27 PROCEDURE — 74011000258 HC RX REV CODE- 258: Performed by: INTERNAL MEDICINE

## 2019-11-27 PROCEDURE — 97530 THERAPEUTIC ACTIVITIES: CPT

## 2019-11-27 PROCEDURE — 77010033678 HC OXYGEN DAILY

## 2019-11-27 PROCEDURE — 94760 N-INVAS EAR/PLS OXIMETRY 1: CPT

## 2019-11-27 PROCEDURE — 97535 SELF CARE MNGMENT TRAINING: CPT

## 2019-11-27 PROCEDURE — 65270000029 HC RM PRIVATE

## 2019-11-27 PROCEDURE — 97166 OT EVAL MOD COMPLEX 45 MIN: CPT

## 2019-11-27 PROCEDURE — 99232 SBSQ HOSP IP/OBS MODERATE 35: CPT | Performed by: INTERNAL MEDICINE

## 2019-11-27 PROCEDURE — 82962 GLUCOSE BLOOD TEST: CPT

## 2019-11-27 PROCEDURE — 74011636637 HC RX REV CODE- 636/637: Performed by: INTERNAL MEDICINE

## 2019-11-27 RX ORDER — FUROSEMIDE 10 MG/ML
20 INJECTION INTRAMUSCULAR; INTRAVENOUS DAILY
Status: DISCONTINUED | OUTPATIENT
Start: 2019-11-28 | End: 2019-11-28 | Stop reason: HOSPADM

## 2019-11-27 RX ORDER — FUROSEMIDE 10 MG/ML
40 INJECTION INTRAMUSCULAR; INTRAVENOUS ONCE
Status: COMPLETED | OUTPATIENT
Start: 2019-11-27 | End: 2019-11-27

## 2019-11-27 RX ADMIN — Medication 10 ML: at 23:01

## 2019-11-27 RX ADMIN — Medication 30 ML: at 23:01

## 2019-11-27 RX ADMIN — Medication 10 ML: at 11:46

## 2019-11-27 RX ADMIN — INSULIN LISPRO 2 UNITS: 100 INJECTION, SOLUTION INTRAVENOUS; SUBCUTANEOUS at 08:54

## 2019-11-27 RX ADMIN — SODIUM CHLORIDE 75 ML/HR: 900 INJECTION, SOLUTION INTRAVENOUS at 04:26

## 2019-11-27 RX ADMIN — HYDROCODONE BITARTRATE AND ACETAMINOPHEN 1 TABLET: 5; 325 TABLET ORAL at 02:36

## 2019-11-27 RX ADMIN — Medication 30 ML: at 05:28

## 2019-11-27 RX ADMIN — HYDROMORPHONE HYDROCHLORIDE 0.5 MG: 1 INJECTION, SOLUTION INTRAMUSCULAR; INTRAVENOUS; SUBCUTANEOUS at 13:32

## 2019-11-27 RX ADMIN — INSULIN LISPRO 2 UNITS: 100 INJECTION, SOLUTION INTRAVENOUS; SUBCUTANEOUS at 11:45

## 2019-11-27 RX ADMIN — TAMSULOSIN HYDROCHLORIDE 0.4 MG: 0.4 CAPSULE ORAL at 08:54

## 2019-11-27 RX ADMIN — HYDROMORPHONE HYDROCHLORIDE 0.5 MG: 1 INJECTION, SOLUTION INTRAMUSCULAR; INTRAVENOUS; SUBCUTANEOUS at 22:54

## 2019-11-27 RX ADMIN — FAMOTIDINE 20 MG: 10 INJECTION INTRAVENOUS at 08:54

## 2019-11-27 RX ADMIN — CEFTRIAXONE 2 G: 2 INJECTION, POWDER, FOR SOLUTION INTRAMUSCULAR; INTRAVENOUS at 17:09

## 2019-11-27 RX ADMIN — INSULIN LISPRO 2 UNITS: 100 INJECTION, SOLUTION INTRAVENOUS; SUBCUTANEOUS at 23:00

## 2019-11-27 RX ADMIN — FUROSEMIDE 40 MG: 40 INJECTION, SOLUTION INTRAMUSCULAR; INTRAVENOUS at 13:20

## 2019-11-27 RX ADMIN — Medication 30 ML: at 13:15

## 2019-11-27 RX ADMIN — Medication 10 ML: at 17:09

## 2019-11-27 RX ADMIN — Medication 10 ML: at 05:29

## 2019-11-27 NOTE — PROGRESS NOTES
present with PT, OT and primary nurse. Thank you for this referral,      Sherilyn Dakins, [facility]  /Patient [address] [facility].  [address] [facility], [facility] [address] [facility]    [phone]

## 2019-11-27 NOTE — PROGRESS NOTES
Date of Outreach Update:  Jeppie Kayser was seen and assessed. Lung sounds clear, decreased, on 2L NC O2 sat 93%, HR 98. Pt states she is having pain in her neck, primary RN made aware. MEWS Score: 1 (11/26/19 1627)  Vitals:    11/26/19 1256 11/26/19 1330 11/26/19 1627 11/26/19 1926   BP: 126/77  121/72 108/69   Pulse: (!) 101 97 98 (!) 102   Resp: 18 18 19   Temp: (!) 100.6 °F (38.1 °C) 98.3 °F (36.8 °C) 98.5 °F (36.9 °C) 100 °F (37.8 °C)   SpO2: 93%  96% 90%   Weight:       Height:             Pain Assessment  Pain Intensity 1: 6 (11/26/19 1807)  Pain Location 1: Abdomen  Pain Intervention(s) 1: Medication (see MAR)  Patient Stated Pain Goal: 0      Previous Outreach assessment has been reviewed. There have been no significant clinical changes since the completion of the last dated Outreach assessment. Will continue to follow up per outreach protocol.     Signed By:   Dayron Mccullough RN    November 26, 2019 8:59 PM

## 2019-11-27 NOTE — PROGRESS NOTES
11/26/19 1254   Dual Skin Pressure Injury Assessment   Dual Skin Pressure Injury Assessment WDL   Second Care Provider (Based on [address] [place] [address]) ALEXEY Rich   Skin Integumentary   Skin Integumentary (WDL) WDL   Skin Color Appropriate for ethnicity   Skin Condition/Temp Warm;Dry   Skin Integrity Excoriation  (Right AC)   Turgor Non-tenting   Hair Growth Present   Varicosities Absent   Wound Prevention and Protection Methods   Orientation of Wound Prevention Posterior   Location of Wound Prevention Sacrum/Coccyx   Dressing Present  No   Wound Offloading (Prevention Methods) Bed, pressure reduction mattress

## 2019-11-27 NOTE — PROGRESS NOTES
Subjective:   Daily Progress Note: 2019 11:43 AM  Pt without complaints . Objective:     Visit Vitals  /75 (BP 1 Location: Left arm, BP Patient Position: At rest)   Pulse 85   Temp 99 °F (37.2 °C)   Resp 20   Ht 5' 6\" (1.676 m)   Wt 205 lb 9.6 oz (93.3 kg)   SpO2 95%   BMI 33.18 kg/m²    O2 Flow Rate (L/min): 2 l/min O2 Device: Nasal cannula    Temp (24hrs), Av.2 °F (37.3 °C), Min:98.3 °F (36.8 °C), Max:100.6 °F (38.1 °C)       190 -  0700  In: 2244 [P.O.:270; I.V.:1974]  Out: 3301 [Urine:3300]  No intake/output data recorded. [unfilled]  [unfilled]  [unfilled]    Exam:       Data Review    Recent Results (from the past 24 hour(s))   LACTIC ACID    Collection Time: 19  3:14 PM   Result Value Ref Range    Lactic acid 1.7 0.4 - 2.0 MMOL/L   GLUCOSE, POC    Collection Time: 19  3:36 PM   Result Value Ref Range    Glucose (POC) 211 (H) 65 - 100 mg/dL   GLUCOSE, POC    Collection Time: 19  8:18 PM   Result Value Ref Range    Glucose (POC) 164 (H) 65 - 100 mg/dL   LACTIC ACID    Collection Time: 19  4:39 AM   Result Value Ref Range    Lactic acid 1.0 0.4 - 2.0 MMOL/L   GLUCOSE, POC    Collection Time: 19  7:40 AM   Result Value Ref Range    Glucose (POC) 152 (H) 65 - 100 mg/dL       Assessment   Principal Problem:    Septic shock ([address]- due to GNR (2019)    Active Problems:    Ureteral stone (2019)      Overview:        Date of Procedure: 2019       Preoperative Diagnosis: Right Ureteral Stone      Postoperative Diagnosis: Right Ureteral Stone        Procedure(s):      CYSTOSCOPY URETERAL STENT INSERTION      RHONDA (acute kidney injury) ([address] (2019)      Diabetes ([address] (2019)      Hydronephrosis due to obstruction of ureter (2019)      Thrombocytopenia ([facility] (2019)      Lactic acidosis (2019)      Hypoproteinemia ([email or web address] (2019)        Plan: Will likely discharge tomorrow on oral antibiotics.  Had fever around midday yesterday. Needs right ureteroscopy, laser lithotripsy, stent after UTI has been treated. Will arrange follow up in office.

## 2019-11-27 NOTE — PROGRESS NOTES
Hospitalist Note     Admit Date:  2019  4:23 PM   Name:  Nahed Perez   Age:  43 y.o.  :  1980   MRN:  [id number]   PCP:  Anderson Castillo MD  Treatment Team: Attending Provider: Reina Burk MD; Consulting Provider: Carroll Harada, MD; Consulting Provider: Souleymane Ramon MD; Utilization Review: Tl Lopez RN; Care Manager: Jero Antony; Physical Therapist: Jacquelyn Gaspar DPT; Occupational Therapist: Sergio Grigsby OT    HPI/Subjective:   42 y/o  female admitted on  with sepsis secondary to a urinary source. CT a/p showed right hydronephrosis with 2 small partially obstructing UVJ stones. Admitted to Urology and started on Zosyn. Hospitalist consulted that night for septic shock, she was transferred to ICU and started on vasopressors. She went for cysto and rt ureteral stent that night. Off vasopressors and sent to floor on . Urine and blood cxs with E. Coli, on ceftriaxone. : Evaluated at bedside and communication via 2-way telephone . Still some SOB, looks tired, lower abdo pain. Appetite is ok. Tm 100.6F around 1pm yesterday. ROS neg otherwise.       Objective:     Patient Vitals for the past 24 hrs:   Temp Pulse Resp BP SpO2   19 1200 98.4 °F (36.9 °C) 87 20 115/73 96 %   19 1141     95 %   19 0742 99 °F (37.2 °C) 85 20 114/75 95 %   19 0357 99.3 °F (37.4 °C) 93 20 117/75 93 %   19 2336 99 °F (37.2 °C) 85 18 102/71 94 %   19 1926 100 °F (37.8 °C) (!) 102 19 108/69 90 %   19 1627 98.5 °F (36.9 °C) 98 18 121/72 96 %   19 1330 98.3 °F (36.8 °C) 97        Oxygen Therapy  O2 Sat (%): 96 % (19 1200)  Pulse via Oximetry: 91 beats per minute (19 1101)  O2 Device: Nasal cannula (19 1200)  O2 Flow Rate (L/min): 2 l/min (19 1200)  FIO2 (%): 28 % (19)    Estimated body mass index is 33.18 kg/m² as calculated from the following:    Height as of this encounter: 5' 6\" (1.676 m). Weight as of this encounter: 93.3 kg (205 lb 9.6 oz). Intake/Output Summary (Last 24 hours) at 11/27/2019 1304  Last data filed at 11/27/2019 0402  Gross per 24 hour   Intake 120 ml   Output 2050 ml   Net -1930 ml       *Note that automatically entered I/Os may not be accurate; dependent on patient compliance with collection and accurate  by techs. General:    Well nourished. Alert. Obese. CV:   RRR. No murmur, rub, or gallop. Lungs:   CTAB. No wheezing, rhonchi, or rales. Abdomen:   Soft, nondistended. Lower abdo ttp, no masses. Extremities: Warm and dry. No cyanosis or edema. Skin:     No rashes or jaundice.    Neuro:  No gross focal deficits    Data Review:  I have reviewed all labs, meds, and studies from the last 24 hours:    Recent Results (from the past 24 hour(s))   LACTIC ACID    Collection Time: 11/26/19  3:14 PM   Result Value Ref Range    Lactic acid 1.7 0.4 - 2.0 MMOL/L   GLUCOSE, POC    Collection Time: 11/26/19  3:36 PM   Result Value Ref Range    Glucose (POC) 211 (H) 65 - 100 mg/dL   GLUCOSE, POC    Collection Time: 11/26/19  8:18 PM   Result Value Ref Range    Glucose (POC) 164 (H) 65 - 100 mg/dL   LACTIC ACID    Collection Time: 11/27/19  4:39 AM   Result Value Ref Range    Lactic acid 1.0 0.4 - 2.0 MMOL/L   GLUCOSE, POC    Collection Time: 11/27/19  7:40 AM   Result Value Ref Range    Glucose (POC) 152 (H) 65 - 100 mg/dL   GLUCOSE, POC    Collection Time: 11/27/19 11:44 AM   Result Value Ref Range    Glucose (POC) 164 (H) 65 - 100 mg/dL        All Micro Results     Procedure Component Value Units Date/Time    CULTURE, URINE [[id number]]  (Abnormal)  (Susceptibility) Collected:  11/24/19 0302    Order Status:  Completed Specimen:  Urine from Clean catch Updated:  11/27/19 0779     Special Requests: NO SPECIAL REQUESTS        Culture result:       10,000 to 50,000 COLONIES/mL PROTEUS MIRABILIS                  10,000 to 50,000 COLONIES/mL MIXED SKIN DAVE ISOLATED          CULTURE, BLOOD [[id number]] Collected:  11/25/19 0511    Order Status:  Completed Specimen:  Blood Updated:  11/27/19 0652     Special Requests: --        LEFT  Antecubital       Culture result: NO GROWTH 2 DAYS       CULTURE, BLOOD [[id number]] Collected:  11/25/19 0511    Order Status:  Completed Specimen:  Blood Updated:  11/27/19 0652     Special Requests: --        LEFT  HAND       Culture result: NO GROWTH 2 DAYS       BLOOD CULTURE ID PANEL [[id number]] Collected:  11/23/19 2240    Order Status:  Completed Specimen:  Blood Updated:  11/26/19 0658     Acc. no. from Micro Order [id number]    CULTURE, BLOOD [[id number]]  (Abnormal) Collected:  11/23/19 2240    Order Status:  Completed Specimen:  Blood Updated:  11/26/19 0653     Special Requests: --        LEFT  HAND       GRAM STAIN GRAM NEGATIVE RODS         PEDIATRIC BOTTLE               RESULTS VERIFIED, PHONED TO AND READ BACK BY Tharon Severe RN @ 9500 ON 11/24/19 AK. Culture result: GRAM NEGATIVE RODS         REFER TO CULTURE  ACC NO. [id number] FOR ID AND SUSCEPTIBILITY    CULTURE, BLOOD [[id number]]  (Abnormal)  (Susceptibility) Collected:  11/23/19 2240    Order Status:  Completed Specimen:  Blood Updated:  11/26/19 0650     Special Requests: --        LEFT  Antecubital       GRAM STAIN GRAM NEGATIVE RODS         PEDIATRIC BOTTLE               RESULTS VERIFIED, PHONED TO AND READ BACK BY Tharon Severe RN @ 2111 ON 11/24/19 AK. Culture result: PROTEUS MIRABILIS         REFER TO BLOOD CULTURE ID PANEL [id number]    WET PREP [[id number]] Collected:  11/23/19 1715    Order Status:  Canceled Specimen:  Vagina           No results found for this visit on 11/23/19.     Current Meds:  Current Facility-Administered Medications   Medication Dose Route Frequency    cefTRIAXone (ROCEPHIN) 2 g in 0.9% sodium chloride (MBP/ADV) 50 mL  2 g IntraVENous Q24H    tamsulosin (FLOMAX) capsule 0.4 mg  0.4 mg Oral DAILY    acetaminophen (TYLENOL) tablet 650 mg  650 mg Oral Q4H PRN    magic mouthwash (CLAUDIO) oral suspension 10 mL  10 mL Swish and Spit TID    sodium chloride (NS) flush 5-40 mL  5-40 mL IntraVENous Q8H    sodium chloride (NS) flush 5-40 mL  5-40 mL IntraVENous PRN    NUTRITIONAL SUPPORT ELECTROLYTE PRN ORDERS   Does Not Apply PRN    naloxone (NARCAN) injection 0.4 mg  0.4 mg IntraVENous PRN    dextrose 40% (GLUTOSE) oral gel 1 Tube  15 g Oral PRN    glucagon (GLUCAGEN) injection 1 mg  1 mg IntraMUSCular PRN    dextrose (D50W) injection syrg 12.5-25 g  25-50 mL IntraVENous PRN    HYDROmorphone (PF) (DILAUDID) injection 0.5 mg  0.5 mg IntraVENous Q4H PRN    insulin lispro (HUMALOG) injection 0-10 Units  0-10 Units SubCUTAneous AC&HS    ketorolac (TORADOL) injection 15 mg  15 mg IntraVENous Q8H PRN    famotidine (PF) (PEPCID) 20 mg in sodium chloride 0.9% 10 mL injection  20 mg IntraVENous Q12H    sodium chloride (NS) flush 30 mL  30 mL InterCATHeter Q8H    sodium chloride (NS) flush 30 mL  30 mL InterCATHeter PRN    heparin (porcine) pf 900 Units  900 Units InterCATHeter PRN    oxybutynin (DITROPAN) tablet 5 mg  5 mg Oral Q6H PRN    LORazepam (ATIVAN) injection 1 mg  1 mg IntraVENous Q6H PRN    0.9% sodium chloride infusion  75 mL/hr IntraVENous CONTINUOUS    HYDROcodone-acetaminophen (NORCO) 5-325 mg per tablet 1 Tab  1 Tab Oral Q4H PRN    ondansetron (ZOFRAN) injection 4 mg  4 mg IntraVENous Q4H PRN    sodium chloride (NS) flush 5-10 mL  5-10 mL IntraVENous PRN    diphenhydrAMINE (BENADRYL) injection 12.5 mg  12.5 mg IntraVENous Q4H PRN       Other Studies (last 24 hours):  Duplex Upper Ext Venous Right    Result Date: 11/27/2019  EXAM: Right upper extremity venous ultrasound. INDICATION: Arm swelling. COMPARISON: None. TECHNIQUE: Ultrasound evaluation of the right upper extremity veins was performed utilizing grayscale, color Doppler and duplex imaging.  FINDINGS: The right internal jugular, innominate, subclavian, axillary, basilic, cephalic, brachial, radial and ulnar veins are patent and compressible, with normal response to augmentation. IMPRESSION: No evidence of right upper extremity DVT. Assessment and Plan:     Hospital Problems as of 11/27/2019 Date Reviewed: 11/26/2019          Codes Class Noted - Resolved POA    Thrombocytopenia ([facility] ICD-10-CM: D69.6  ICD-9-CM: 287.5  11/25/2019 - Present Yes        Lactic acidosis ICD-10-CM: E87.2  ICD-9-CM: 276.2  11/25/2019 - Present Yes        Hypoproteinemia ([facility] ICD-10-CM: E77.8  ICD-9-CM: 273.8  11/25/2019 - Present Yes        * (Principal) Septic shock ([facility]- due to GNR ICD-10-CM: A41.9, R65.21  ICD-9-CM: 038.9, 785.52, 995.92  11/24/2019 - Present Yes        RHONDA (acute kidney injury) ([facility] ICD-10-CM: N17.9  ICD-9-CM: 584.9  11/24/2019 - Present Yes        Diabetes ([facility] ICD-10-CM: E11.9  ICD-9-CM: 250.00  11/24/2019 - Present Yes        Hydronephrosis due to obstruction of ureter ICD-10-CM: N13.2  ICD-9-CM: 227, 593.4  11/24/2019 - Present Yes        Ureteral stone ICD-10-CM: N20.1  ICD-9-CM: 592.1  11/23/2019 - Present Yes    Overview Signed 11/24/2019  1:07 PM by Gary Acosta MD        Date of Procedure: 11/24/2019   Preoperative Diagnosis: Right Ureteral Stone  Postoperative Diagnosis: Right Ureteral Stone    Procedure(s):  CYSTOSCOPY URETERAL STENT INSERTION                   Plan:  # Septic shock secondary to proteus mirabilis bacteremia   - Off pressors, transfer out of ICU on 11/26   - Ceftriaxone 2g daily. Ok to transition to orals now or at discharge. # Right sided obstructive uropathy   - S/p cysto with stent at admission. Flomax. - Symptom control    # SOB    - Likely some fluid overload. DC IVFs today. Given lasix yesterday, give again today x1. # RHONDA   - Resolved. DC planning/Dispo: Per primary.     Diet:  DIET DIABETIC WITH OPTIONS  DIET NUTRITIONAL SUPPLEMENTS  DVT ppx: SCDs      Signed:  Richard Spring Husam Castellanos MD

## 2019-11-27 NOTE — PROGRESS NOTES
Date of Outreach Update:  Gayle Owusu was seen and assessed, sitting on the bedside commode at this time. O2 sats 93%, HR 98. MEWS Score: 1 (11/26/19 2336)  Vitals:    11/26/19 1330 11/26/19 1627 11/26/19 1926 11/26/19 2336   BP:  121/72 108/69 102/71   Pulse: 97 98 (!) 102 85   Resp:  18 19 18   Temp: 98.3 °F (36.8 °C) 98.5 °F (36.9 °C) 100 °F (37.8 °C) 99 °F (37.2 °C)   SpO2:  96% 90% 94%   Weight:       Height:             Pain Assessment  Pain Intensity 1: 0 (11/26/19 1928)  Pain Location 1: Abdomen  Pain Intervention(s) 1: Medication (see MAR)  Patient Stated Pain Goal: 0      Previous Outreach assessment has been reviewed. There have been no significant clinical changes since the completion of the last dated Outreach assessment. Will continue to follow up per outreach protocol.     Signed By:   Kim Yanes RN    November 27, 2019 3:19 AM

## 2019-11-27 NOTE — PROGRESS NOTES
[facility] CRITICAL CARE OUTREACH NURSE PROGRESS REPORT      SUBJECTIVE: Called to assess patient secondary to recent transfer from ICU. MEWS Score: 1 (11/27/19 1200)  Vitals:    11/27/19 0637 11/27/19 0742 11/27/19 1141 11/27/19 1200   BP:  114/75  115/73   Pulse:  85  87   Resp:  20  20   Temp:  99 °F (37.2 °C)  98.4 °F (36.9 °C)   SpO2:  95% 95% 96%   Weight: 93.3 kg (205 lb 9.6 oz)      Height:          . LAB DATA:    Recent Labs     11/26/19  1008 11/26/19  0326 11/25/19  1617 11/25/19  0308  11/24/19 2239   NA  --  141  --  142  --   --    K 4.2 3.4* 3.7 3.4*   < >  --    CL  --  111*  --  112*  --   --    CO2  --  27  --  25  --   --    AGAP  --  3*  --  5*  --   --    GLU  --  158*  --  152*  --   --    BUN  --  9  --  10  --   --    CREA  --  0.62  --  0.70  --   --    GFRAA  --  >60  --  >60  --   --    GFRNA  --  >60  --  >60  --   --    CA  --  6.7*  --  6.7*  --   --    MG 2.3 1.7*  --   --   --   --    ALB  --  2.1*  --  2.2*  --  2.3*   TP  --  5.2*  --  5.3*  --   --    GLOB  --  3.1  --  3.1  --   --    AGRAT  --  0.7*  --  0.7*  --   --    ALT  --  26  --  34  --   --     < > = values in this interval not displayed. Recent Labs     11/26/19  0326 11/25/19  0308   WBC 13.6* 13.3*   HGB 9.9* 11.0*   HCT 30.3* 33.3*   PLT 59* 64*          OBJECTIVE: On arrival to room, I found patient to be resting in bed. Family at bedside. Pain Assessment  Pain Intensity 1: 3 (11/27/19 1341)  Pain Location 1: Abdomen, Back  Pain Intervention(s) 1: (pre-medicated)  Patient Stated Pain Goal: 0                                 ASSESSMENT:  Patient is alert, able to communicate needs currently via family member at bedside. Denies pain, nausea, SOB. Does appears slightly tachypneic. VSS. Extra pillow provided for comfort. NAD. PLAN:  Will continue to monitor per outreach protocol.

## 2019-11-27 NOTE — PROGRESS NOTES
Patient A/O x4. Admission assessment complete after transfer from ICU. Pain and fever managed per MAR. Patient voiding yellow, clear. Hourly rounds performed. All needs meet. Bed low/locked. Call light within reach. Patient denies needs at this time.   Will continue to monitor and report to oncoming RN

## 2019-11-27 NOTE — PROGRESS NOTES
Problem: Mobility Impaired (Adult and Pediatric)  Goal: *Acute Goals and Plan of Care (Insert Text)  Description  LTG:  (1.)Ms. Ori Haider will move from supine to sit and sit to supine, scoot up and down and roll side to side INDEPENDENTLY with bed flat within 7 treatment day(s). (2.)Ms. Ori Haider will transfer from bed to chair and chair to bed INDEPENDENTLY within 7 treatment day(s). (3.)Ms. Ori Haider will ambulate with SUPERVISION for 450+ feet with the least restrictive device within 7 treatment day(s). (4.)Ms. Ori Haider will perform exercises per HEP for 10+ minutes to improve strength and mobility within 7 days. (5.)Ms. Ori Haider will ascend and descend 3 steps with SUPERVISION using handrail within 7 days. ________________________________________________________________________________________________   Outcome: Progressing Towards Goal     PHYSICAL THERAPY: Initial Assessment and AM 11/27/2019  INPATIENT: PT Visit Days : 1  Payor: MEDICAID PENDING / Plan: Delmy Villalpando PENDING / Product Type: Medicaid /       NAME/AGE/GENDER: Rey Camacho is a 43 y.o. female   PRIMARY DIAGNOSIS: Ureteral stone [N20.1] Septic shock ([address] Septic shock ([address]  Procedure(s) (LRB):  CYSTOSCOPY URETERAL STENT INSERTION (Right)  3 Days Post-Op  ICD-10: Treatment Diagnosis:    Generalized Muscle Weakness (M62.81)  Difficulty in walking, Not elsewhere classified (R26.2)  Other abnormalities of gait and mobility (R26.89)   Precaution/Allergies:  Patient has no known allergies. ASSESSMENT:     Ms. Ori Haider is a 43year old female admitted from home with ureteral stone and septic shock, s/p cystoscopy and stent insertion. Seen with  present due to language barrier. She lives alone and is fully independent at baseline without use of any DME; works as .  She presents sitting up in bed without complaints and is agreeable to therapy assessment. C/o post op soreness. Pt requires SBA-min assist for mobility and transfers. Demonstrates intact seated balance. Worked on standing static and dynamic mobility and balance activities in room with mostly constant min handheld assist. Demonstrates slow gait pace. O2 sats dropping to 87% on room air so 2L reapplied. Ambulates total of 40 ft in room and hallway initially with min handheld assist progressing to mod handheld assist due to fatigue and LE weakness. Pt denies dizziness however states knees feel very weak. Returned to room and up to chair after activity; positioned comfortably with niece present and lunch set up. Reminded to call for staff assistance when ready to return to bed or needing to use bathroom. [facility] appears to be functioning well below baseline with strength, mobility, balance, transfers, and activity tolerance and appears very weak during activity. Discharge needs TBD pending progress with therapy. At this time, patient is appropriate for Co-treatment with occupational therapy due to patient's decreased overall endurance/tolerance levels, as well as need for high level skilled assistance to complete functional transfers/mobility and functional tasks. Chuck Fallon is appropriate for a multidisciplinary co-treatment of PT and OT to address goals of both disciplines.        This section established at most recent assessment   PROBLEM LIST (Impairments causing functional limitations):  Decreased Strength  Decreased Transfer Abilities  Decreased Ambulation Ability/Technique  Decreased Balance  Increased Pain  Decreased Activity Tolerance   INTERVENTIONS PLANNED: (Benefits and precautions of physical therapy have been discussed with the patient.)  Balance Exercise  Bed Mobility  Gait Training  Home Exercise Program (HEP)  Therapeutic Activites  Therapeutic Exercise/Strengthening  Transfer Training     TREATMENT PLAN: Frequency/Duration: 3 times a week for duration of hospital stay  Rehabilitation Potential For Stated Goals: Good     REHAB RECOMMENDATIONS (at time of discharge pending progress):    Placement: It is my opinion, based on this patient's performance to date, that Ms. Hansel Bee may benefit from participating in 1-2 additional therapy sessions in order to continue to assess for rehab potential and then make recommendation for disposition at discharge. Tbd pending progress with therapy  Equipment:   Tbd pending progress with therapy               HISTORY:   History of Present Injury/Illness (Reason for Referral):  Per H&P, \"A 39-year-old female came to the ER with acute onset of right lower quadrant pain radiating to the back. The patient only speaks [place] They communicated with an  by phone line around 07:00 p.m. today and the patient stated that she was having some vomiting since earlier today. Her urinalysis shows 3 to 5 epithelials, 3 to 5 wbc, 0 to 3 rbc, trace bacteria. She had a CT of abdomen and pelvis with contrast which shows two separate stones in the right distal ureter with mild right-sided hydronephrosis. There is a 6-mm right distal ureteral stone and a 2-mm stone just proximal to that on the right. Lab work in the ER shows a white count 13. 6. Chemistry showed potassium of 3.7, creatinine 1.09. The patient was admitted, started on IV fluids, and given tamsulosin and they will see if she could pass the stone. She was going to be admitted for pain control because she continued to have pain in the ER despite narcotics. After reaching the floor, she had complained by phone milo that she was having some shortness of breath. She was also noted to be tachycardic with her pulse rate approximately 130. Sepsis labs were obtained and remarkable for an elevated lactic acid of 6.3. Cultures were taken. Shortly after that, she became febrile with temperature going up to 103.8 at 22:50.   She was started on IV Rocephin and we gave her a fluid bolus. She became hypotensive with blood pressure going down to 60/33. Hospitalist is seeing the patient. She has been started on Levophed drip. She is going to be transferred to the intensive care unit and started on Zosyn. I am able to speak to the patient. She appears to be alert. She has a palpable pulse which is fairly strong at this point\"    Past Medical History/Comorbidities:   Ms. Hansel eBe  has a past medical history of Diabetes ([facility] [address] Ms. Hansel Bee  has a past surgical history that includes hx dilation and curettage. Social History/Living Environment:   Home Environment: Private residence  # Steps to Enter: 3  One/Two Story Residence: One story  Living Alone: Yes  Support Systems: Friends \ neighbors, Family member(s)  Patient Expects to be Discharged to[de-identified] Private residence  Current DME Used/Available at Home: None  Prior Level of Function/Work/Activity:  Lives alone. Fully independent, works as . Number of Personal Factors/Comorbidities that affect the Plan of Care: 1-2: MODERATE COMPLEXITY   EXAMINATION:   Most Recent Physical Functioning:   Gross Assessment:  AROM: Within functional limits  Strength: Generally decreased, functional  Coordination: Within functional limits               Posture:  Posture (WDL): Exceptions to WDL  Posture Assessment: Forward head, Rounded shoulders  Balance:  Sitting: Intact  Standing: Impaired  Standing - Static: Fair(-)  Standing - Dynamic : Fair(-) Bed Mobility:  Rolling: Stand-by assistance  Supine to Sit: Stand-by assistance  Scooting: Stand-by assistance  Wheelchair Mobility:     Transfers:  Sit to Stand: Contact guard assistance;Minimum assistance  Stand to Sit: Stand-by assistance  Bed to Chair: Minimum assistance  Interventions: Verbal cues; Safety awareness training; Tactile cues  Duration: 8 Minutes  Gait:     Base of Support: Narrowed; Center of gravity altered  Speed/Renetta: Pace decreased (<100 feet/min); Slow  Step Length: Left shortened;Right shortened  Gait Abnormalities: Trunk sway increased;Decreased step clearance; Path deviations  Distance (ft): 40 Feet (ft)  Assistive Device: Other (comment)(min handheld assist)  Ambulation - Level of Assistance: Minimal assistance; Moderate assistance  Interventions: Verbal cues; Visual/Demos; Safety awareness training; Tactile cues;Manual cues      Body Structures Involved:  Muscles Body Functions Affected:  Sensory/Pain  Movement Related Activities and Participation Affected:  General Tasks and Demands  Mobility  Domestic Life  Community, Social and Civic Life   Number of elements that affect the Plan of Care: 4+: HIGH COMPLEXITY   CLINICAL PRESENTATION:   Presentation: Stable and uncomplicated: LOW COMPLEXITY   CLINICAL DECISION MAKIN [facility] Inpatient Short Form  How much difficulty does the patient currently have. .. Unable A Lot A Little None   1. Turning over in bed (including adjusting bedclothes, sheets and blankets)? [] 1   [] 2   [] 3   [x] 4   2. Sitting down on and standing up from a chair with arms ( e.g., wheelchair, bedside commode, etc.)   [] 1   [] 2   [x] 3   [] 4   3. Moving from lying on back to sitting on the side of the bed? [] 1   [] 2   [] 3   [x] 4   How much help from another person does the patient currently need. .. Total A Lot A Little None   4. Moving to and from a bed to a chair (including a wheelchair)? [] 1   [] 2   [x] 3   [] 4   5. Need to walk in hospital room? [] 1   [] 2   [x] 3   [] 4   6. Climbing 3-5 steps with a railing? [] 1   [x] 2   [] 3   [] 4   © , Trustees of [address] [place] [address] [address] [id number], under license to [employer].  All rights reserved      Score:  Initial: 19 Most Recent: X (Date: -- )    Interpretation of Tool:  Represents activities that are increasingly more difficult (i.e. Bed mobility, Transfers, Gait).    Medical Necessity:     Patient demonstrates   good   rehab potential due to higher previous functional level. Reason for Services/Other Comments:  Patient   continues to demonstrate capacity to improve strength, mobility, balance, transfers, and activity tolerance which will   increase independence, decrease amount of assistance required from caregiver, and increase safety  . Use of outcome tool(s) and clinical judgement create a POC that gives a: Clear prediction of patient's progress: LOW COMPLEXITY            TREATMENT:   (In addition to Assessment/Re-Assessment sessions the following treatments were rendered)   Pre-treatment Symptoms/Complaints:  \"thank you\"  Pain: Initial:   Pain Intensity 1: 0  Post Session:  0/10     Therapeutic Activity: (  8 Minutes ):  Therapeutic activities including Chair transfers, standing static and dynamic balance, and Ambulation on level ground in room and hallway to improve mobility, strength, balance, and activity tolerance . Required minimal Verbal cues; Visual/Demos; Safety awareness training; Tactile cues;Manual cues to promote static and dynamic balance in standing and promote motor control of bilateral, lower extremity(s). Braces/Orthotics/Lines/Etc:   IV  O2 Device: Nasal cannula  Treatment/Session Assessment:    Response to Treatment:  pt performs mobility with min A progressing to mod A due to weakness during ambulation  Interdisciplinary Collaboration:   Physical Therapist  Registered Nurse  After treatment position/precautions:   Up in chair  Bed/Chair-wheels locked  Bed in low position  Call light within reach  Family at bedside   Compliance with Program/Exercises: Will assess as treatment progresses  Recommendations/Intent for next treatment session: \"Next visit will focus on advancements to more challenging activities and reduction in assistance provided\".   Total Treatment Duration:  PT Patient Time In/Time Out  Time In: 1116  Time Out: [address] [facility] MAHI Coates

## 2019-11-27 NOTE — PROGRESS NOTES
rounded on patient with nurse. Interpreting services offered when needed. Thank you,      Perla Griffin 91  [email or web address] c: [phone] / [address] [facility] / [place], [address] [place] [address]  [email or web address]

## 2019-11-27 NOTE — PROGRESS NOTES
available on-site from 4:30 p.m. - 1:00 a.m. Please call Ponce at [phone] with any interpreting requests. Thank you,      Perla Mcleod 91  [email or web address] c: [phone] / [address] / [place], [facility]  [email or web address]

## 2019-11-28 VITALS
DIASTOLIC BLOOD PRESSURE: 71 MMHG | HEART RATE: 75 BPM | SYSTOLIC BLOOD PRESSURE: 122 MMHG | RESPIRATION RATE: 20 BRPM | HEIGHT: 66 IN | TEMPERATURE: 98.5 F | BODY MASS INDEX: 31.47 KG/M2 | WEIGHT: 195.8 LBS | OXYGEN SATURATION: 96 %

## 2019-11-28 LAB — GLUCOSE BLD STRIP.AUTO-MCNC: 149 MG/DL (ref 65–100)

## 2019-11-28 PROCEDURE — 82962 GLUCOSE BLOOD TEST: CPT

## 2019-11-28 PROCEDURE — 74011250636 HC RX REV CODE- 250/636: Performed by: INTERNAL MEDICINE

## 2019-11-28 PROCEDURE — 74011250637 HC RX REV CODE- 250/637: Performed by: UROLOGY

## 2019-11-28 RX ORDER — HYDROCODONE BITARTRATE AND ACETAMINOPHEN 5; 325 MG/1; MG/1
1 TABLET ORAL
Qty: 20 TAB | Refills: 0 | Status: SHIPPED | OUTPATIENT
Start: 2019-11-28 | End: 2019-12-05

## 2019-11-28 RX ORDER — CEFPODOXIME PROXETIL 100 MG/1
100 TABLET, FILM COATED ORAL 2 TIMES DAILY
Qty: 14 TAB | Refills: 0 | Status: SHIPPED | OUTPATIENT
Start: 2019-11-28 | End: 2019-12-05

## 2019-11-28 RX ORDER — HYOSCYAMINE SULFATE 0.12 MG/1
0.12 TABLET SUBLINGUAL
Qty: 30 TAB | Refills: 1 | Status: SHIPPED | OUTPATIENT
Start: 2019-11-28 | End: 2020-01-07

## 2019-11-28 RX ADMIN — Medication 10 ML: at 08:47

## 2019-11-28 RX ADMIN — FUROSEMIDE 20 MG: 40 INJECTION, SOLUTION INTRAMUSCULAR; INTRAVENOUS at 08:45

## 2019-11-28 RX ADMIN — Medication 10 ML: at 05:06

## 2019-11-28 RX ADMIN — TAMSULOSIN HYDROCHLORIDE 0.4 MG: 0.4 CAPSULE ORAL at 08:45

## 2019-11-28 RX ADMIN — Medication 30 ML: at 05:05

## 2019-11-28 NOTE — PROGRESS NOTES
Leticia [facility] CRITICAL CARE OUTREACH NURSE PROGRESS REPORT      SUBJECTIVE: Called to assess patient secondary to transfer from ICU. MEWS Score: 1 (11/27/19 1921)  Vitals:    11/27/19 1141 11/27/19 1200 11/27/19 1603 11/27/19 1921   BP:  115/73 118/74 121/72   Pulse:  87 87 86   Resp:  20 20 18   Temp:  98.4 °F (36.9 °C) 99.2 °F (37.3 °C) 99.3 °F (37.4 °C)   SpO2: 95% 96% 94% 94%   Weight:       Height:              LAB DATA:    Recent Labs     11/26/19  1008 11/26/19  0326 11/25/19  1617 11/25/19  0308   NA  --  141  --  142   K 4.2 3.4* 3.7 3.4*   CL  --  111*  --  112*   CO2  --  27  --  25   AGAP  --  3*  --  5*   GLU  --  158*  --  152*   BUN  --  9  --  10   CREA  --  0.62  --  0.70   GFRAA  --  >60  --  >60   GFRNA  --  >60  --  >60   CA  --  6.7*  --  6.7*   MG 2.3 1.7*  --   --    ALB  --  2.1*  --  2.2*   TP  --  5.2*  --  5.3*   GLOB  --  3.1  --  3.1   AGRAT  --  0.7*  --  0.7*   ALT  --  26  --  34        Recent Labs     11/26/19  0326 11/25/19  0308   WBC 13.6* 13.3*   HGB 9.9* 11.0*   HCT 30.3* 33.3*   PLT 59* 64*          OBJECTIVE: On arrival to room, I found patient to be visiting with family. Pain Assessment  Pain Intensity 1: 0 (11/27/19 1854)  Pain Location 1: Abdomen, Back  Pain Intervention(s) 1: (pre-medicated)  Patient Stated Pain Goal: 0                                 ASSESSMENT:  Pt visiting with family at present. VSS. O2 via NC intact. No complaints voiced.       PLAN:  Will continue to monitor per protocol

## 2019-11-28 NOTE — PROGRESS NOTES
[facility] CRITICAL CARE OUTREACH NURSE PROGRESS REPORT      SUBJECTIVE: Called to assess patient secondary to outreach protocol. MEWS Score: 1 (11/27/19 1921)  Vitals:    11/27/19 1141 11/27/19 1200 11/27/19 1603 11/27/19 1921   BP:  115/73 118/74 121/72   Pulse:  87 87 86   Resp:  20 20 18   Temp:  98.4 °F (36.9 °C) 99.2 °F (37.3 °C) 99.3 °F (37.4 °C)   SpO2: 95% 96% 94% 94%   Weight:       Height:            LAB DATA:    Recent Labs     11/26/19  1008 11/26/19  0326 11/25/19  1617 11/25/19  0308  11/24/19 2239   NA  --  141  --  142  --   --    K 4.2 3.4* 3.7 3.4*   < >  --    CL  --  111*  --  112*  --   --    CO2  --  27  --  25  --   --    AGAP  --  3*  --  5*  --   --    GLU  --  158*  --  152*  --   --    BUN  --  9  --  10  --   --    CREA  --  0.62  --  0.70  --   --    GFRAA  --  >60  --  >60  --   --    GFRNA  --  >60  --  >60  --   --    CA  --  6.7*  --  6.7*  --   --    MG 2.3 1.7*  --   --   --   --    ALB  --  2.1*  --  2.2*  --  2.3*   TP  --  5.2*  --  5.3*  --   --    GLOB  --  3.1  --  3.1  --   --    AGRAT  --  0.7*  --  0.7*  --   --    ALT  --  26  --  34  --   --     < > = values in this interval not displayed. Recent Labs     11/26/19  0326 11/25/19  0308   WBC 13.6* 13.3*   HGB 9.9* 11.0*   HCT 30.3* 33.3*   PLT 59* 64*          OBJECTIVE: On arrival to room, I found patient to be sleeping in bed. Pain Assessment  Pain Intensity 1: 0 (11/27/19 9088)  Pain Location 1: Abdomen, Back  Pain Intervention(s) 1: (pre-medicated)  Patient Stated Pain Goal: 0                                 ASSESSMENT:  Pt is sleeping in bed. VSS. No distress noted. Breathing is even and unlabored. PLAN:  Will continue to monitor per protocol.

## 2019-11-28 NOTE — PROGRESS NOTES
I have reviewed discharge instructions with the patient. The patient verbalized understanding. 3 scripts given. Patient transported to [facility] via wheelchair. Primary RN native [place]ym[place]es speaker and .     Marianela Biggs, RN, [facility]/ Registered Nurse 6th floor,   Patient Relations & Interpreting Services  [email or web address]

## 2019-11-28 NOTE — PROGRESS NOTES
Urology Progress Note    Admit Date: 11/23/2019    Subjective:     Patient has no new complaints. Afebrile. Urine Cultures growing proteus. On antibiotics. Pain controlled. Having spasms from stent but controlled with meds. Tolerating diet.       Objective:     Patient Vitals for the past 8 hrs:   BP Temp Pulse Resp SpO2 Weight   11/28/19 0355 124/76 99.4 °F (37.4 °C) 89 20 96 % 195 lb 12.8 oz (88.8 kg)   11/27/19 2340 115/72 99.1 °F (37.3 °C) 78 18 95 %      11/27 1901 - 11/28 0700  In: 240 [P.O.:240]  Out: -   11/26 0701 - 11/27 1900  In: 120 [P.O.:120]  Out: 2300 [Urine:2300]    Physical Exam:   Visit Vitals  /76 (BP 1 Location: Left arm, BP Patient Position: At rest)   Pulse 89   Temp 99.4 °F (37.4 °C)   Resp 20   Ht 5' 6\" (1.676 m)   Wt 195 lb 12.8 oz (88.8 kg)   SpO2 96%   BMI 31.60 kg/m²        GENERAL: No acute distress, Awake, Alert, Oriented X 3, Gait normal  CARDIAC: regular rate and rhythm  CHEST AND LUNG: Easy work of breathing, clear to auscultation bilaterally, no cyanosis  ABDOMEN: soft, non tender, non-distended, positive bowel sounds, no organomegaly, no palpable masses, no guarding, no rebound tenderness  SKIN: No rash, no erythema, no lacerations or abrasions, no ecchymosis  NEUROLOGIC: cranial nerves 2-12 grossly intact           Data Review   Recent Results (from the past 24 hour(s))   GLUCOSE, POC    Collection Time: 11/27/19  7:40 AM   Result Value Ref Range    Glucose (POC) 152 (H) 65 - 100 mg/dL   GLUCOSE, POC    Collection Time: 11/27/19 11:44 AM   Result Value Ref Range    Glucose (POC) 164 (H) 65 - 100 mg/dL   GLUCOSE, POC    Collection Time: 11/27/19  4:00 PM   Result Value Ref Range    Glucose (POC) 140 (H) 65 - 100 mg/dL   LACTIC ACID    Collection Time: 11/27/19  6:11 PM   Result Value Ref Range    Lactic acid 1.2 0.4 - 2.0 MMOL/L   GLUCOSE, POC    Collection Time: 11/27/19  8:54 PM   Result Value Ref Range    Glucose (POC) 155 (H) 65 - 100 mg/dL           Assessment: Principal Problem:    Septic shock ([address]- due to GNR (11/24/2019)    Active Problems:    Ureteral stone (11/23/2019)      Overview:        Date of Procedure: 11/24/2019       Preoperative Diagnosis: Right Ureteral Stone      Postoperative Diagnosis: Right Ureteral Stone        Procedure(s):      CYSTOSCOPY URETERAL STENT INSERTION      RHONDA (acute kidney injury) ([address] (11/24/2019)      Diabetes ([address] (11/24/2019)      Hydronephrosis due to obstruction of ureter (11/24/2019)      Thrombocytopenia ([address] (11/25/2019)      Lactic acidosis (11/25/2019)      Hypoproteinemia ([address] (11/25/2019)      S/P R ureteral stent POD 4. Doing well. Now afebrile X 24 hours. Plan:     -Regular diet  -SLIV  -PO pain control  -Anti-spasmodics PRN bladder spasms  -Dispo: D/C today on PO vantin per urine culture. Dr. Gongora Spearing office will call patient to set up Cystoscopy, RIGHT Ureteroscopy, Laser Lithotripsy, RIGHT Ureteral Stent Removal in the next few weeks. Barber Solomon M.D.     [facility]  [place]  [address] [place] [address] [address]  Phone: [phone]  Fax: [phone]

## 2019-11-28 NOTE — PROGRESS NOTES
Date of Outreach Update:  Carson Norwood was seen and assessed. MEWS Score: 1 (11/27/19 2340)  Vitals:    11/27/19 1603 11/27/19 1921 11/27/19 2340 11/28/19 0355   BP: 118/74 121/72 115/72 124/76   Pulse: 87 86 78 89   Resp: 20 18 18 20   Temp: 99.2 °F (37.3 °C) 99.3 °F (37.4 °C) 99.1 °F (37.3 °C) 99.4 °F (37.4 °C)   SpO2: 94% 94% 95% 96%   Weight:    88.8 kg (195 lb 12.8 oz)   Height:             Pain Assessment  Pain Intensity 1: 0 (11/28/19 0324)  Pain Location 1: Abdomen, Back  Pain Intervention(s) 1: (pre-medicated)  Patient Stated Pain Goal: 0      Previous Outreach assessment has been reviewed. There have been no significant clinical changes since the completion of the last dated Outreach assessment. Patient has completed the outreach program.  Pt will be discharged at this time. Please call with any further concerns.   Thank You    Signed By:   Nathaniel Kidd RN    November 28, 2019 6:14 AM

## 2019-11-28 NOTE — DISCHARGE SUMMARY
Physician Discharge Summary    Name: [address] Record Number: [id number]       Account Number:  [de-identified]  YOB: 1980                         Age:  35 y.o. Admit date:  11/23/2019                    Discharge date:  11/28/2019    Attending Physician:  Nanette Alicea            Service: SURGERY    Physician Summary completed by: Nitza Jones. Joey Nogueira MD    Reason for hospitalization: Right Ureter Stones    Significant PMH:   Past Medical History:   Diagnosis Date    Diabetes ([address]        Allergies:   No Known Allergies    Brief Hospital Course: The patient was admitted and had the procedure listed below performed without difficulty. Her hospital course progressed as expected. She recovered from her sepsis and remained afebrile X 24 hours prior to discharge. No acute events occurred throughout her hospital stay. She was discharged in stable condition with PO antibiotics. She will follow up in 1 week to plan for Cystoscopy, RIGHT Ureteroscopy, Laser Lithotripsy, RIGHT Ureteral Stent removal.       Admission Physical Exam notable for:    R flank pain, Fevers    Admission Lab/Radiology studies notable for:   Right ureter stone on CT    Surgical Procedures:  Cystoscopy, Right Ureteral Stent Placement     Condition at Discharge: Stable    Discharge Diagnoses:     Ureteral stone [N20.1]    Significant Diagnostic Studies and Procedures:  Noted in brief hospital course.     Consults:  Pulmonary Critical care    Patient Disposition: home       Patient instructions/medications:    Current Facility-Administered Medications:     furosemide (LASIX) injection 20 mg, 20 mg, IntraVENous, DAILY, Ayse Cruz MD    cefTRIAXone (ROCEPHIN) 2 g in 0.9% sodium chloride (MBP/ADV) 50 mL, 2 g, IntraVENous, Q24H, Kristy Joyner MD, Last Rate: 100 mL/hr at 11/27/19 1709, 2 g at 11/27/19 1709    tamsulosin (FLOMAX) capsule 0.4 mg, 0.4 mg, Oral, DAILY, Yumiko Low MD, 0.4 mg at 11/27/19 0854    acetaminophen (TYLENOL) tablet 650 mg, 650 mg, Oral, Q4H PRN, Dustin Longo MD, 650 mg at 11/26/19 2022    magic mouthwash (CLAUDIO) oral suspension 10 mL, 10 mL, Swish and Spit, TID, Kelby Calderon MD, 10 mL at 11/27/19 2301    sodium chloride (NS) flush 5-40 mL, 5-40 mL, IntraVENous, Q8H, Scar Unger MD, 10 mL at 11/28/19 0506    sodium chloride (NS) flush 5-40 mL, 5-40 mL, IntraVENous, PRN, Prisca Dash MD    NUTRITIONAL SUPPORT ELECTROLYTE PRN ORDERS, , Does Not Apply, PRN, Scar Unger MD    naloxone [facility]) injection 0.4 mg, 0.4 mg, IntraVENous, PRN, Scar Unger MD    dextrose 40% (GLUTOSE) oral gel 1 Tube, 15 g, Oral, PRN, Scar Unger MD    glucagon (GLUCAGEN) injection 1 mg, 1 mg, IntraMUSCular, PRN, Scar Unger MD    dextrose (D50W) injection syrg 12.5-25 g, 25-50 mL, IntraVENous, PRN, Scar Unger MD    HYDROmorphone (PF) (DILAUDID) injection 0.5 mg, 0.5 mg, IntraVENous, Q4H PRN, Natividad Hernández MD, 0.5 mg at 11/27/19 2254    insulin lispro (HUMALOG) injection 0-10 Units, 0-10 Units, SubCUTAneous, AC&HS, Natividad Hernández MD, 2 Units at 11/27/19 2300    ketorolac (TORADOL) injection 15 mg, 15 mg, IntraVENous, Q8H PRN, Kelby Calderon MD, 15 mg at 11/26/19 0527    sodium chloride (NS) flush 30 mL, 30 mL, InterCATHeter, Q8H, Vinny Calderon MD, 30 mL at 11/28/19 0505    sodium chloride (NS) flush 30 mL, 30 mL, InterCATHeter, PRN, Shakila Calderon MD    heparin (porcine) pf 900 Units, 900 Units, InterCATHeter, PRN, Roro Shakila Musa MD    oxybutynin [facility]) tablet 5 mg, 5 mg, Oral, Q6H PRN, Scar Unger MD, 5 mg at 11/23/19 2058    LORazepam (ATIVAN) injection 1 mg, 1 mg, IntraVENous, Q6H PRN, Scar Unger MD    HYDROcodone-acetaminophen (NORCO) 5-325 mg per tablet 1 Tab, 1 Tab, Oral, Q4H PRN, Scar Unger MD, 1 Tab at 11/27/19 0236    ondansetron (ZOFRAN) injection 4 mg, 4 mg, IntraVENous, Q4H PRN, Scar Unger MD, 4 mg at 11/25/19 2045    sodium chloride (NS) flush 5-10 mL, 5-10 mL, IntraVENous, PRN, Scar Unger MD    diphenhydrAMINE (BENADRYL) injection 12.5 mg, 12.5 mg, IntraVENous, Q4H PRN, Scar Unger MD    Pending items needing follow up: Chuck Lehman was instructed to follow up as indicated above. Signed:  Hardy Gitelman. Alexander Page M.D. [facility]  [address]  Phone: [phone]  Fax: [phone]    cc:  Primary Care Physician:  Verified  Referring physicians:     Additional provider(s):

## 2019-11-29 LAB
C TRACH RRNA SPEC QL NAA+PROBE: NEGATIVE
SPECIMEN SOURCE: NORMAL

## 2019-12-15 ENCOUNTER — HOSPITAL ENCOUNTER (EMERGENCY)
Age: 39
Discharge: HOME | End: 2019-12-16
Attending: EMERGENCY MEDICINE
Payer: SELF-PAY

## 2019-12-15 ENCOUNTER — APPOINTMENT (OUTPATIENT)
Dept: CT IMAGING | Age: 39
End: 2019-12-15
Attending: EMERGENCY MEDICINE
Payer: SELF-PAY

## 2019-12-15 ENCOUNTER — APPOINTMENT (OUTPATIENT)
Dept: GENERAL RADIOLOGY | Age: 39
End: 2019-12-15
Attending: EMERGENCY MEDICINE
Payer: SELF-PAY

## 2019-12-15 DIAGNOSIS — R31.9 URINARY TRACT INFECTION WITH HEMATURIA, SITE UNSPECIFIED: ICD-10-CM

## 2019-12-15 DIAGNOSIS — T83.122A DISPLACEMENT OF URETERAL STENT, INITIAL ENCOUNTER (HCC): Primary | ICD-10-CM

## 2019-12-15 DIAGNOSIS — N39.0 URINARY TRACT INFECTION WITH HEMATURIA, SITE UNSPECIFIED: ICD-10-CM

## 2019-12-15 LAB
ALBUMIN SERPL-MCNC: 3.4 G/DL (ref 3.5–5)
ALBUMIN/GLOB SERPL: 0.7 {RATIO} (ref 1.2–3.5)
ALP SERPL-CCNC: 103 U/L (ref 50–136)
ALT SERPL-CCNC: 62 U/L (ref 12–65)
ANION GAP SERPL CALC-SCNC: 5 MMOL/L (ref 7–16)
APPEARANCE UR: ABNORMAL
AST SERPL-CCNC: 36 U/L (ref 15–37)
BACTERIA URNS QL MICRO: ABNORMAL /HPF
BASOPHILS # BLD: 0 K/UL (ref 0–0.2)
BASOPHILS NFR BLD: 0 % (ref 0–2)
BILIRUB SERPL-MCNC: 0.4 MG/DL (ref 0.2–1.1)
BILIRUB UR QL: NEGATIVE
BUN SERPL-MCNC: 13 MG/DL (ref 6–23)
CALCIUM SERPL-MCNC: 9.1 MG/DL (ref 8.3–10.4)
CASTS URNS QL MICRO: ABNORMAL /LPF
CHLORIDE SERPL-SCNC: 103 MMOL/L (ref 98–107)
CO2 SERPL-SCNC: 30 MMOL/L (ref 21–32)
COLOR UR: ABNORMAL
CREAT SERPL-MCNC: 0.85 MG/DL (ref 0.6–1)
DIFFERENTIAL METHOD BLD: ABNORMAL
EOSINOPHIL # BLD: 0.1 K/UL (ref 0–0.8)
EOSINOPHIL NFR BLD: 1 % (ref 0.5–7.8)
EPI CELLS #/AREA URNS HPF: ABNORMAL /HPF
ERYTHROCYTE [DISTWIDTH] IN BLOOD BY AUTOMATED COUNT: 12.6 % (ref 11.9–14.6)
GLOBULIN SER CALC-MCNC: 4.8 G/DL (ref 2.3–3.5)
GLUCOSE SERPL-MCNC: 171 MG/DL (ref 65–100)
GLUCOSE UR STRIP.AUTO-MCNC: NEGATIVE MG/DL
HCT VFR BLD AUTO: 37 % (ref 35.8–46.3)
HGB BLD-MCNC: 12.2 G/DL (ref 11.7–15.4)
HGB UR QL STRIP: ABNORMAL
IMM GRANULOCYTES # BLD AUTO: 0 K/UL (ref 0–0.5)
IMM GRANULOCYTES NFR BLD AUTO: 0 % (ref 0–5)
KETONES UR QL STRIP.AUTO: NEGATIVE MG/DL
LACTATE BLD-SCNC: 1.23 MMOL/L (ref 0.5–1.9)
LEUKOCYTE ESTERASE UR QL STRIP.AUTO: ABNORMAL
LYMPHOCYTES # BLD: 1.9 K/UL (ref 0.5–4.6)
LYMPHOCYTES NFR BLD: 21 % (ref 13–44)
MCH RBC QN AUTO: 30.3 PG (ref 26.1–32.9)
MCHC RBC AUTO-ENTMCNC: 33 G/DL (ref 31.4–35)
MCV RBC AUTO: 92 FL (ref 79.6–97.8)
MONOCYTES # BLD: 0.7 K/UL (ref 0.1–1.3)
MONOCYTES NFR BLD: 7 % (ref 4–12)
NEUTS SEG # BLD: 6.6 K/UL (ref 1.7–8.2)
NEUTS SEG NFR BLD: 71 % (ref 43–78)
NITRITE UR QL STRIP.AUTO: NEGATIVE
NRBC # BLD: 0 K/UL (ref 0–0.2)
PH UR STRIP: 7.5 [PH] (ref 5–9)
PLATELET # BLD AUTO: 206 K/UL (ref 150–450)
PMV BLD AUTO: 9.2 FL (ref 9.4–12.3)
POTASSIUM SERPL-SCNC: 3.6 MMOL/L (ref 3.5–5.1)
PROT SERPL-MCNC: 8.2 G/DL (ref 6.3–8.2)
PROT UR STRIP-MCNC: 100 MG/DL
RBC # BLD AUTO: 4.02 M/UL (ref 4.05–5.2)
RBC #/AREA URNS HPF: >100 /HPF
SODIUM SERPL-SCNC: 138 MMOL/L (ref 136–145)
SP GR UR REFRACTOMETRY: 1.02 (ref 1–1.02)
UROBILINOGEN UR QL STRIP.AUTO: 0.2 EU/DL (ref 0.2–1)
WBC # BLD AUTO: 9.4 K/UL (ref 4.3–11.1)
WBC URNS QL MICRO: >100 /HPF

## 2019-12-15 PROCEDURE — 87186 SC STD MICRODIL/AGAR DIL: CPT

## 2019-12-15 PROCEDURE — 83605 ASSAY OF LACTIC ACID: CPT

## 2019-12-15 PROCEDURE — 87086 URINE CULTURE/COLONY COUNT: CPT

## 2019-12-15 PROCEDURE — 81001 URINALYSIS AUTO W/SCOPE: CPT

## 2019-12-15 PROCEDURE — 74018 RADEX ABDOMEN 1 VIEW: CPT

## 2019-12-15 PROCEDURE — 74176 CT ABD & PELVIS W/O CONTRAST: CPT

## 2019-12-15 PROCEDURE — 99285 EMERGENCY DEPT VISIT HI MDM: CPT | Performed by: EMERGENCY MEDICINE

## 2019-12-15 PROCEDURE — 87088 URINE BACTERIA CULTURE: CPT

## 2019-12-15 PROCEDURE — 74011000258 HC RX REV CODE- 258: Performed by: EMERGENCY MEDICINE

## 2019-12-15 PROCEDURE — 74011250636 HC RX REV CODE- 250/636: Performed by: EMERGENCY MEDICINE

## 2019-12-15 PROCEDURE — 96365 THER/PROPH/DIAG IV INF INIT: CPT | Performed by: EMERGENCY MEDICINE

## 2019-12-15 PROCEDURE — 85025 COMPLETE CBC W/AUTO DIFF WBC: CPT

## 2019-12-15 PROCEDURE — 80053 COMPREHEN METABOLIC PANEL: CPT

## 2019-12-15 PROCEDURE — 96375 TX/PRO/DX INJ NEW DRUG ADDON: CPT | Performed by: EMERGENCY MEDICINE

## 2019-12-15 RX ORDER — ONDANSETRON 2 MG/ML
4 INJECTION INTRAMUSCULAR; INTRAVENOUS
Status: COMPLETED | OUTPATIENT
Start: 2019-12-15 | End: 2019-12-15

## 2019-12-15 RX ORDER — MORPHINE SULFATE 4 MG/ML
4 INJECTION INTRAVENOUS
Status: COMPLETED | OUTPATIENT
Start: 2019-12-15 | End: 2019-12-15

## 2019-12-15 RX ADMIN — CEFTRIAXONE 1 G: 1 INJECTION, POWDER, FOR SOLUTION INTRAMUSCULAR; INTRAVENOUS at 23:22

## 2019-12-15 RX ADMIN — ONDANSETRON 4 MG: 2 INJECTION INTRAMUSCULAR; INTRAVENOUS at 23:20

## 2019-12-15 RX ADMIN — SODIUM CHLORIDE 1000 ML: 900 INJECTION, SOLUTION INTRAVENOUS at 22:58

## 2019-12-15 RX ADMIN — MORPHINE SULFATE 4 MG: 4 INJECTION INTRAVENOUS at 23:21

## 2019-12-16 VITALS
HEART RATE: 104 BPM | RESPIRATION RATE: 18 BRPM | SYSTOLIC BLOOD PRESSURE: 112 MMHG | OXYGEN SATURATION: 96 % | BODY MASS INDEX: 31.6 KG/M2 | DIASTOLIC BLOOD PRESSURE: 71 MMHG | TEMPERATURE: 98.1 F | HEIGHT: 66 IN

## 2019-12-16 RX ORDER — CEPHALEXIN 500 MG/1
500 CAPSULE ORAL 3 TIMES DAILY
Qty: 21 CAP | Refills: 0 | Status: SHIPPED | OUTPATIENT
Start: 2019-12-15 | End: 2019-12-22

## 2019-12-16 RX ORDER — KETOROLAC TROMETHAMINE 10 MG/1
10 TABLET, FILM COATED ORAL
Qty: 15 TAB | Refills: 0 | Status: SHIPPED | OUTPATIENT
Start: 2019-12-15

## 2019-12-16 NOTE — ED PROVIDER NOTES
Liliana Peraza is a 35 y.o. female seen on 12/15/2019 at 11:07 PM in the [facility] EMERGENCY DEPT in room ER08/08. Chief Complaint   Patient presents with    Bladder Infection     HPI: 50-year-old female presenting to the emergency department complaint of dysuria and hematuria. She has a recent past medical history notable for a infected kidney stone resulting in hydronephrosis requiring stenting and associated with sepsis and an ICU admission. She states she was discharged 2 weeks ago. She is had some baseline pain but her pain got significantly worse at around 4 PM today. She is complained of significant pain in the right lower quadrant radiating to her right flank. She notes her dysuria has gotten significantly worse as well. She was told that her stent will come out in 2 weeks, but she has not been contacted yet regarding this. Historian: Patient via a professional     REVIEW OF SYSTEMS     Review of Systems   Constitutional: Negative for fever. HENT: Negative. Eyes: Negative. Respiratory: Negative for cough, chest tightness, shortness of breath and wheezing. Cardiovascular: Negative for chest pain. Gastrointestinal: Positive for abdominal pain. Negative for abdominal distention, constipation, diarrhea and vomiting. Endocrine: Negative. Genitourinary: Positive for dysuria and flank pain. Negative for frequency and urgency. Neurological: Negative for dizziness, syncope and headaches. Psychiatric/Behavioral: Negative. All other systems reviewed and are negative.       PAST MEDICAL HISTORY     Past Medical History:   Diagnosis Date    Diabetes [facility])      Past Surgical History:   Procedure Laterality Date    HX DILATION AND CURETTAGE       Social History     Socioeconomic History    Marital status: LEGALLY      Spouse name: Not on file    Number of children: Not on file    Years of education: Not on file    Highest education level: Not on file   Tobacco Use    Smoking status: Never Smoker     Prior to Admission Medications   Prescriptions Last Dose Informant Patient Reported? Taking? SITagliptin (JANUVIA) 100 mg tablet   Yes No   Sig: Take 100 mg by mouth daily. diclofenac EC (VOLTAREN) 75 mg EC tablet   No No   Sig: Take 1 Tab by mouth two (2) times a day. hyoscyamine SL (LEVSIN/SL) 0.125 mg SL tablet   No No   Si Tab by SubLINGual route every four (4) hours as needed for Cramping (bladder spasms). Facility-Administered Medications: None     No Known Allergies     PHYSICAL EXAM       Vitals:    12/15/19 2214 12/15/19 2259   BP: 128/78 118/66   Pulse: (!) 116 (!) 106   Resp: 18    Temp: 98.8 °F (37.1 °C)    SpO2: 98% 98%    Vital signs were reviewed. Physical Exam  Vitals signs reviewed. Constitutional:       General: She is not in acute distress. Appearance: She is well-developed. She is not diaphoretic. HENT:      Head: Normocephalic and atraumatic. Eyes:      Pupils: Pupils are equal, round, and reactive to light. Neck:      Musculoskeletal: Normal range of motion and neck supple. Cardiovascular:      Rate and Rhythm: Normal rate and regular rhythm. Heart sounds: Normal heart sounds. No murmur. No friction rub. No gallop. Pulmonary:      Effort: Pulmonary effort is normal. No respiratory distress. Breath sounds: Normal breath sounds. No stridor. No wheezing. Abdominal:      General: Bowel sounds are normal. There is no distension. Palpations: Abdomen is soft. There is no mass. Tenderness: There is tenderness in the right lower quadrant. There is right CVA tenderness. There is no left CVA tenderness, guarding or rebound. Musculoskeletal: Normal range of motion. General: No tenderness or deformity. Skin:     General: Skin is warm and dry. Findings: No erythema or rash.    Neurological:      Mental Status: She is alert and oriented to person, place, and time. Sensory: No sensory deficit. Comments: No focal neuro deficits   Psychiatric:         Behavior: Behavior normal.          MEDICAL DECISION MAKING     MDM  Number of Diagnoses or Management Options     Amount and/or Complexity of Data Reviewed  Clinical lab tests: ordered and reviewed  Tests in the radiology section of CPT®: ordered and reviewed  Review and summarize past medical records: yes    Risk of Complications, Morbidity, and/or Mortality  Presenting problems: high  Diagnostic procedures: high  Management options: high      Procedures    ED Course:    11:57 PM  Shows no acute abnormalities. The patient's urine is somewhat difficult to interpret given that she does have a stent in place. However given her new onset dysuria and recent history of urosepsis resulting in ICU admission, will place on Keflex. I recommended that she call to urology office tomorrow for close outpatient follow-up. She has no signs or findings concerning for sepsis at this time. Disposition: Discharge home  Diagnosis: Urinary tract infection, ureteral stent  ____________________________________________________________________  A portion of this note was generated using voice recognition dictation software. While the note has been reviewed for accuracy, please note certain words and phrases may not be transcribed as intended and some grammatical and/or typographical errors may be present.

## 2019-12-16 NOTE — ED NOTES
I have reviewed discharge instructions with the patient. The patient verbalized understanding. Patient left ED via Discharge Method: ambulatory to Home with self to lobby at this time. Pt waiting in lobby for niece to pick her up from ER. Opportunity for questions and clarification provided. Patient given 2 scripts. To continue your aftercare when you leave the hospital, you may receive an automated call from our care team to check in on how you are doing. This is a free service and part of our promise to provide the best care and service to meet your aftercare needs.  If you have questions, or wish to unsubscribe from this service please call [phone]. Thank you for Choosing our [employer] [facility] Emergency Department.

## 2019-12-16 NOTE — ED TRIAGE NOTES
Pt presents to ED with urinary burning, states it hurts to sit , stated having blood in her urine. Pt states she was treated for kidney stones 3 weeks ago with stent placement. Stated shes had a fever at home. Stated she took Tylenol today. Pt is a Indonesian speaking only,  has been called with a 30 min ETA. She stated she was in the ICU for hypotension last time she was here 3 weeks ago. Pt also c/o lower back/ flank pain more so on the right.

## 2019-12-16 NOTE — ED NOTES
At CT patient reports she is not pregnant and is willing to sign waiver reporting she is not pregnant. Negative pregnancy test in our records 11/23/2019 and pt reports not sexually active in past week. Annie Jasso MD notified. Per MD okay to continue with CT at this time.

## 2019-12-16 NOTE — DISCHARGE INSTRUCTIONS
Call the urology office tomorrow for an appointment to be seen in the next 1 to 2 days for reevaluation to ensure you are improving and find out when the stent needs to be taken out.

## 2019-12-19 LAB
BACTERIA SPEC CULT: ABNORMAL
SERVICE CMNT-IMP: ABNORMAL

## 2020-01-10 ENCOUNTER — ANESTHESIA EVENT (OUTPATIENT)
Dept: SURGERY | Age: 40
End: 2020-01-10
Payer: SELF-PAY

## 2020-01-10 NOTE — PROGRESS NOTES
Spoke with patient over the phone . Patient is aware of the arrival time at 7:00 and to be NPO after midnight. Patient verbalizes understanding. Alexandria Lubin, [facility]  assigned to cover this request. Additional services can be given upon request.            Thank you for this referral,     Dearl Perla Weber   Patient [address]  [address], [address] [facility]  [phone]

## 2020-01-11 ENCOUNTER — APPOINTMENT (OUTPATIENT)
Dept: GENERAL RADIOLOGY | Age: 40
End: 2020-01-11
Attending: ANESTHESIOLOGY
Payer: SELF-PAY

## 2020-01-11 ENCOUNTER — ANESTHESIA (OUTPATIENT)
Dept: SURGERY | Age: 40
End: 2020-01-11
Payer: SELF-PAY

## 2020-01-11 ENCOUNTER — HOSPITAL ENCOUNTER (OUTPATIENT)
Age: 40
Setting detail: OUTPATIENT SURGERY
Discharge: HOME | End: 2020-01-11
Attending: UROLOGY | Admitting: UROLOGY
Payer: SELF-PAY

## 2020-01-11 VITALS
DIASTOLIC BLOOD PRESSURE: 66 MMHG | RESPIRATION RATE: 18 BRPM | HEIGHT: 66 IN | OXYGEN SATURATION: 98 % | WEIGHT: 181.5 LBS | TEMPERATURE: 98.2 F | HEART RATE: 77 BPM | SYSTOLIC BLOOD PRESSURE: 112 MMHG | BODY MASS INDEX: 29.17 KG/M2

## 2020-01-11 LAB
ATRIAL RATE: 74 BPM
CALCULATED P AXIS, ECG09: 56 DEGREES
CALCULATED R AXIS, ECG10: 29 DEGREES
CALCULATED T AXIS, ECG11: 25 DEGREES
DIAGNOSIS, 93000: NORMAL
GLUCOSE BLD STRIP.AUTO-MCNC: 188 MG/DL (ref 65–100)
HCG UR QL: NEGATIVE
P-R INTERVAL, ECG05: 172 MS
Q-T INTERVAL, ECG07: 404 MS
QRS DURATION, ECG06: 82 MS
QTC CALCULATION (BEZET), ECG08: 448 MS
VENTRICULAR RATE, ECG03: 74 BPM

## 2020-01-11 PROCEDURE — 71045 X-RAY EXAM CHEST 1 VIEW: CPT

## 2020-01-11 PROCEDURE — 93005 ELECTROCARDIOGRAM TRACING: CPT | Performed by: ANESTHESIOLOGY

## 2020-01-11 PROCEDURE — 74011250636 HC RX REV CODE- 250/636: Performed by: ANESTHESIOLOGY

## 2020-01-11 PROCEDURE — 81025 URINE PREGNANCY TEST: CPT

## 2020-01-11 PROCEDURE — 74011250636 HC RX REV CODE- 250/636: Performed by: UROLOGY

## 2020-01-11 PROCEDURE — 77030040361 HC SLV COMPR DVT MDII -B: Performed by: UROLOGY

## 2020-01-11 PROCEDURE — 76210000016 HC OR PH I REC 1 TO 1.5 HR: Performed by: UROLOGY

## 2020-01-11 PROCEDURE — 74011250636 HC RX REV CODE- 250/636: Performed by: NURSE ANESTHETIST, CERTIFIED REGISTERED

## 2020-01-11 PROCEDURE — 77030018832 HC SOL IRR H20 ICUM -A: Performed by: UROLOGY

## 2020-01-11 PROCEDURE — 76210000021 HC REC RM PH II 0.5 TO 1 HR: Performed by: UROLOGY

## 2020-01-11 PROCEDURE — 76060000032 HC ANESTHESIA 0.5 TO 1 HR: Performed by: UROLOGY

## 2020-01-11 PROCEDURE — 74011000250 HC RX REV CODE- 250: Performed by: NURSE ANESTHETIST, CERTIFIED REGISTERED

## 2020-01-11 PROCEDURE — 82962 GLUCOSE BLOOD TEST: CPT

## 2020-01-11 PROCEDURE — 76010000138 HC OR TIME 0.5 TO 1 HR: Performed by: UROLOGY

## 2020-01-11 PROCEDURE — 77030010509 HC AIRWY LMA MSK TELE -A: Performed by: NURSE ANESTHETIST, CERTIFIED REGISTERED

## 2020-01-11 PROCEDURE — C1769 GUIDE WIRE: HCPCS | Performed by: UROLOGY

## 2020-01-11 RX ORDER — MIDAZOLAM HYDROCHLORIDE 1 MG/ML
2 INJECTION, SOLUTION INTRAMUSCULAR; INTRAVENOUS
Status: DISCONTINUED | OUTPATIENT
Start: 2020-01-11 | End: 2020-01-11 | Stop reason: HOSPADM

## 2020-01-11 RX ORDER — OXYCODONE HYDROCHLORIDE 5 MG/1
5 TABLET ORAL
Status: DISCONTINUED | OUTPATIENT
Start: 2020-01-11 | End: 2020-01-11 | Stop reason: HOSPADM

## 2020-01-11 RX ORDER — SODIUM CHLORIDE, SODIUM LACTATE, POTASSIUM CHLORIDE, CALCIUM CHLORIDE 600; 310; 30; 20 MG/100ML; MG/100ML; MG/100ML; MG/100ML
100 INJECTION, SOLUTION INTRAVENOUS CONTINUOUS
Status: DISCONTINUED | OUTPATIENT
Start: 2020-01-11 | End: 2020-01-11 | Stop reason: HOSPADM

## 2020-01-11 RX ORDER — CEFAZOLIN SODIUM/WATER 2 G/20 ML
2 SYRINGE (ML) INTRAVENOUS
Status: COMPLETED | OUTPATIENT
Start: 2020-01-11 | End: 2020-01-11

## 2020-01-11 RX ORDER — HYDROMORPHONE HYDROCHLORIDE 2 MG/ML
0.5 INJECTION, SOLUTION INTRAMUSCULAR; INTRAVENOUS; SUBCUTANEOUS
Status: DISCONTINUED | OUTPATIENT
Start: 2020-01-11 | End: 2020-01-11 | Stop reason: HOSPADM

## 2020-01-11 RX ORDER — DEXAMETHASONE SODIUM PHOSPHATE 4 MG/ML
INJECTION, SOLUTION INTRA-ARTICULAR; INTRALESIONAL; INTRAMUSCULAR; INTRAVENOUS; SOFT TISSUE AS NEEDED
Status: DISCONTINUED | OUTPATIENT
Start: 2020-01-11 | End: 2020-01-11 | Stop reason: HOSPADM

## 2020-01-11 RX ORDER — MIDAZOLAM HYDROCHLORIDE 1 MG/ML
2 INJECTION, SOLUTION INTRAMUSCULAR; INTRAVENOUS ONCE
Status: DISCONTINUED | OUTPATIENT
Start: 2020-01-11 | End: 2020-01-11 | Stop reason: HOSPADM

## 2020-01-11 RX ORDER — ONDANSETRON 2 MG/ML
INJECTION INTRAMUSCULAR; INTRAVENOUS AS NEEDED
Status: DISCONTINUED | OUTPATIENT
Start: 2020-01-11 | End: 2020-01-11 | Stop reason: HOSPADM

## 2020-01-11 RX ORDER — LIDOCAINE HYDROCHLORIDE 20 MG/ML
INJECTION, SOLUTION EPIDURAL; INFILTRATION; INTRACAUDAL; PERINEURAL AS NEEDED
Status: DISCONTINUED | OUTPATIENT
Start: 2020-01-11 | End: 2020-01-11 | Stop reason: HOSPADM

## 2020-01-11 RX ORDER — HYDROMORPHONE HYDROCHLORIDE 2 MG/ML
INJECTION, SOLUTION INTRAMUSCULAR; INTRAVENOUS; SUBCUTANEOUS
Status: ACTIVE
Start: 2020-01-11 | End: 2020-01-11

## 2020-01-11 RX ORDER — NALOXONE HYDROCHLORIDE 0.4 MG/ML
0.04 INJECTION, SOLUTION INTRAMUSCULAR; INTRAVENOUS; SUBCUTANEOUS
Status: DISCONTINUED | OUTPATIENT
Start: 2020-01-11 | End: 2020-01-11 | Stop reason: HOSPADM

## 2020-01-11 RX ORDER — LIDOCAINE HYDROCHLORIDE 10 MG/ML
0.1 INJECTION INFILTRATION; PERINEURAL AS NEEDED
Status: DISCONTINUED | OUTPATIENT
Start: 2020-01-11 | End: 2020-01-11 | Stop reason: HOSPADM

## 2020-01-11 RX ORDER — FENTANYL CITRATE 50 UG/ML
INJECTION, SOLUTION INTRAMUSCULAR; INTRAVENOUS AS NEEDED
Status: DISCONTINUED | OUTPATIENT
Start: 2020-01-11 | End: 2020-01-11 | Stop reason: HOSPADM

## 2020-01-11 RX ORDER — PROPOFOL 10 MG/ML
INJECTION, EMULSION INTRAVENOUS AS NEEDED
Status: DISCONTINUED | OUTPATIENT
Start: 2020-01-11 | End: 2020-01-11 | Stop reason: HOSPADM

## 2020-01-11 RX ORDER — FENTANYL CITRATE 50 UG/ML
100 INJECTION, SOLUTION INTRAMUSCULAR; INTRAVENOUS ONCE
Status: DISCONTINUED | OUTPATIENT
Start: 2020-01-11 | End: 2020-01-11 | Stop reason: HOSPADM

## 2020-01-11 RX ADMIN — SODIUM CHLORIDE, SODIUM LACTATE, POTASSIUM CHLORIDE, AND CALCIUM CHLORIDE 100 ML/HR: 600; 310; 30; 20 INJECTION, SOLUTION INTRAVENOUS at 08:21

## 2020-01-11 RX ADMIN — ONDANSETRON 4 MG: 2 INJECTION INTRAMUSCULAR; INTRAVENOUS at 09:06

## 2020-01-11 RX ADMIN — FENTANYL CITRATE 25 MCG: 50 INJECTION INTRAMUSCULAR; INTRAVENOUS at 09:18

## 2020-01-11 RX ADMIN — HYDROMORPHONE HYDROCHLORIDE 0.5 MG: 2 INJECTION INTRAMUSCULAR; INTRAVENOUS; SUBCUTANEOUS at 10:31

## 2020-01-11 RX ADMIN — DEXAMETHASONE SODIUM PHOSPHATE 4 MG: 4 INJECTION, SOLUTION INTRAMUSCULAR; INTRAVENOUS at 09:06

## 2020-01-11 RX ADMIN — Medication 2 G: at 09:08

## 2020-01-11 RX ADMIN — PROPOFOL 200 MG: 10 INJECTION, EMULSION INTRAVENOUS at 09:02

## 2020-01-11 RX ADMIN — LIDOCAINE HYDROCHLORIDE 100 MG: 20 INJECTION, SOLUTION EPIDURAL; INFILTRATION; INTRACAUDAL; PERINEURAL at 09:02

## 2020-01-11 NOTE — PERIOP NOTES
VSS, Patient complaining of lower sternal chest pain which goes to her back, nothing makes it better or worse and no changes with deep breaths. Dr. Freitas Shelter informed and states he is on his way to see the patient.

## 2020-01-11 NOTE — DISCHARGE INSTRUCTIONS
Cystoscopy: What to Expect at [address] [facility]  A cystoscopy is a procedure that lets a doctor look inside of the bladder and the urethra. The urethra is the tube that carries urine from the bladder to outside the body. The doctor uses a thin, lighted tube called a cystoscope to look for kidney or bladder stones, tumors, bleeding, or infection. After the cystoscopy, your urethra may be sore at first, and it may burn when you urinate for the first few days after the procedure. You may feel the need to urinate more often, and your urine may be pink. These symptoms should get better in 1 or 2 days. You will probably be able to go back to work or most of your usual activities in 1 or 2 days. How can you care for yourself at home? Activity  · Rest when you feel tired. Getting enough sleep will help you recover. · Try to walk each day. Start by walking a little more than you did the day before. Bit by bit, increase the amount you walk. Walking boosts blood flow and helps prevent pneumonia and constipation. · Avoid strenuous activities, such as bicycle riding, jogging, weight lifting, or aerobic exercise, until your doctor says it is okay. · Ask your doctor when you can drive again. · Most people are able to return to work within 1 or 2 days after the procedure. · You may shower and take baths as usual.  · Ask your doctor when it is okay for you to have sex. Diet  · You can eat your normal diet. If your stomach is upset, try bland, low-fat foods like plain rice, broiled chicken, toast, and yogurt. · Drink plenty of fluids (unless your doctor tells you not to). Medicines  · Take pain medicines exactly as directed. ¨ If the doctor gave you a prescription medicine for pain, take it as prescribed. ¨ If you are not taking a prescription pain medicine, ask your doctor if you can take an over-the-counter medicine.   · If you think your pain medicine is making you sick to your stomach:  ¨ Take your medicine after meals (unless your doctor has told you not to). ¨ Ask your doctor for a different pain medicine. · If your doctor prescribed antibiotics, take them as directed. Do not stop taking them just because you feel better. You need to take the full course of antibiotics. Follow-up care is a key part of your treatment and safety. Be sure to make and go to all appointments, and call your doctor if you are having problems. It's also a good idea to know your test results and keep a list of the medicines you take. When should you call for help? Call 911 anytime you think you may need emergency care. For example, call if:  · You passed out (lost consciousness). · You have severe trouble breathing. · You have sudden chest pain and shortness of breath, or you cough up blood. · You have severe belly pain. Call your doctor now or seek immediate medical care if:  · You are sick to your stomach or cannot keep fluids down. · Your urine is still red or you see blood clots after you have urinated several times. · You have trouble passing urine or stool, especially if you have pain or swelling in your lower belly. · You have signs of a blood clot, such as:  ¨ Pain in your calf, back of the knee, thigh, or groin. ¨ Redness and swelling in your leg or groin. · You develop a fever or severe chills. · You have pain in your back just below your rib cage. This is called flank pain. Watch closely for changes in your health, and be sure to contact your doctor if:  · A burning feeling is normal for a day or two after the test, but call if it does not get better. · You have a frequent urge to urinate but can pass only small amounts of urine. · It is normal for the urine to have a pinkish color for a few days after the test, but call if it does not get better or if your urine is cloudy, smells bad, or has pus in it.     After general anesthesia or intravenous sedation, for 24 hours or while taking prescription Narcotics:  · Limit your activities  · A responsible adult needs to be with you for the next 24 hours  · Do not drive and operate hazardous machinery  · Do not make important personal or business decisions  · Do  not drink alcoholic beverages  · If you have not urinated within 8 hours after discharge, please contact your surgeon on call. · If you have sleep apnea and have a CPAP machine, please use it for all naps and sleeping. · Please use caution when taking narcotics and any of your home medications that may cause drowsiness. *  Please give a list of your current medications to your Primary Care Provider. *  Please update this list whenever your medications are discontinued, doses are      changed, or new medications (including over-the-counter products) are added. *  Please carry medication information at all times in case of emergency situations. These are general instructions for a healthy lifestyle:  No smoking/ No tobacco products/ Avoid exposure to second hand smoke  Surgeon General's Warning:  Quitting smoking now greatly reduces serious risk to your health. Obesity, smoking, and sedentary lifestyle greatly increases your risk for illness  A healthy diet, regular physical exercise & weight monitoring are important for maintaining a healthy lifestyle    You may be retaining fluid if you have a history of heart failure or if you experience any of the following symptoms:  Weight gain of 3 pounds or more overnight or 5 pounds in a week, increased swelling in our hands or feet or shortness of breath while lying flat in bed. Please call your doctor as soon as you notice any of these symptoms; do not wait until your next office visit.

## 2020-01-11 NOTE — BRIEF OP NOTE
BRIEF OPERATIVE NOTE    Date of Procedure: 1/11/2020   Preoperative Diagnosis: Ureteral stone [N20.1]  Postoperative Diagnosis: * No post-op diagnosis entered *    Procedure(s):  CYSTOSCOPY RIGHT URETEROSCOPY LASER LITHO RIGHT STENT EXCHANGE  Surgeon(s) and Role:     * Pavan Thornton MD - Primary         Surgical Assistant: none    Surgical Staff:  Circ-1: Broderick Smith RN  Event Time In Time Out   Incision Start 6434    Incision Close 0934      Anesthesia: General   Estimated Blood Loss: none  Specimens: * No specimens in log *   Findings: see op note   Complications: none  Implants: * No implants in log *

## 2020-01-11 NOTE — ANESTHESIA POSTPROCEDURE EVALUATION
Procedure(s):  CYSTOSCOPY/RIGHT STENT REMOVAL. general    Anesthesia Post Evaluation        Patient location during evaluation: PACU  Patient participation: complete - patient participated  Level of consciousness: awake  Pain management: satisfactory to patient  Airway patency: patent  Anesthetic complications: no  Cardiovascular status: hemodynamically stable  Respiratory status: spontaneous ventilation  Hydration status: euvolemic  Post anesthesia nausea and vomiting:  none    About midway through her PACU stay, she c/o sharp pain mostly in her right scapula but also left chest.  There were no exacerbating or relieving factors, denied ache or tightness, it may have been related to respirations and she had mild associated SOB. She has no CV hx and has good exercise tolerance. Upon further questioning, she did have some similar pain preop. Her lungs were clear and VSS. 12 lead ECG and CXR were unremarkable and the pain went completely away on its own. I reassured her but also asked her to return to the ED if the pain returned. Vitals Value Taken Time   /70 1/11/2020 11:04 AM   Temp 36.8 °C (98.2 °F) 1/11/2020 11:04 AM   Pulse 65 1/11/2020 11:05 AM   Resp 18 1/11/2020 11:04 AM   SpO2 100 % 1/11/2020 11:05 AM   Vitals shown include unvalidated device data.

## 2020-01-11 NOTE — ANESTHESIA PREPROCEDURE EVALUATION
Relevant Problems   No relevant active problems       Anesthetic History   No history of anesthetic complications            Review of Systems / Medical History  Patient summary reviewed, nursing notes reviewed and pertinent labs reviewed    Pulmonary  Within defined limits                 Neuro/Psych   Within defined limits           Cardiovascular  Within defined limits                Exercise tolerance: >4 METS     GI/Hepatic/Renal         Renal disease: stones       Endo/Other    Diabetes: type 2         Other Findings              Physical Exam    Airway  Mallampati: III  TM Distance: < 4 cm  Neck ROM: normal range of motion   Mouth opening: Diminished (comment)     Cardiovascular  Regular rate and rhythm,  S1 and S2 normal,  no murmur, click, rub, or gallop             Dental      Comments: Chipped teeth   Pulmonary  Breath sounds clear to auscultation               Abdominal         Other Findings            Anesthetic Plan    ASA: 2  Anesthesia type: general          Induction: Intravenous  Anesthetic plan and risks discussed with: Patient       present and utilized.

## 2020-01-11 NOTE — ROUTINE PROCESS
present for PACU, examination with Dr. Danae Jimenez, chest xray, EKG and discharge instructions.      Rudy Sheikh CHI//   Patient Relations & Interpreting Services  p: [phone] / [email or web address]

## 2020-01-12 NOTE — OP NOTES
[address] [facility]  OPERATIVE REPORT    Name:  René Pereira  MR#:  [id number]  :  1980  ACCOUNT #:  [de-identified]  DATE OF SERVICE:  2020    PREOPERATIVE DIAGNOSIS:  Right ureteral calculus. POSTOPERATIVE DIAGNOSIS:  No calculus found. PROCEDURE PERFORMED:   Cystoscopy, right ureteral stent removal, right ureteroscopy. SURGEON:  Janet Pitts MD    ASSISTANT:  None. ANESTHESIA:  General.    COMPLICATIONS:  None. SPECIMENS REMOVED:  None. IMPLANTS:  None. ESTIMATED BLOOD LOSS:  None. INDICATION:  The patient had a previous right ureteral stent placement because of urosepsis due to right distal ureteral stones. DESCRIPTION OF PROCEDURE:  The patient was given general anesthetic, placed in the dorsal lithotomy position. She was prepped and draped in a sterile fashion. Fluoroscopy was performed and shows a double-J stent on the right side. I did not see any ureteral stones. The previous CT from 12/15/2019 shows a tiny stone in her right lower darwin. A 22-Cypriot cystoscope was advanced into the urethra using video camera with 30-degree lens. The stent was grasped and the end was pulled out to the urethral meatus. I tried to pass a guidewire through the lumen of the stent but due to calcification, the wire was not able to pass through the stent. The stent was then removed. The scope was passed back into the bladder. There were no stones or tumors in the bladder. The guidewire was passed up the right ureter using fluoroscopy. A 6.5 Cypriot semi-rigid ureteroscope was then passed alongside the guidewire. The ureter is passively dilated due to the previous stent. I did not see any stones in the ureter. The scope was passed all the way up into the renal pelvis and no ureteral stones were noted. The scope was then removed. I did not replace the stent. Scope was removed after draining the bladder. PLAN:  She could be discharged home. She can return p.r.n.  I discussed this with the family member using an .         MD POOJA [email or web address]  D:  01/11/2020 10:00  T:  01/11/2020 20:47  JOB #:  [id number]

## 2020-07-09 NOTE — PROGRESS NOTES
Gave bedside shift report to ALEXEY Marie. Clear bilaterally, pupils equal, round and reactive to light.

## 2020-12-15 ENCOUNTER — HOSPITAL ENCOUNTER (EMERGENCY)
Age: 40
Discharge: HOME | End: 2020-12-15
Attending: EMERGENCY MEDICINE

## 2020-12-15 ENCOUNTER — APPOINTMENT (OUTPATIENT)
Dept: GENERAL RADIOLOGY | Age: 40
End: 2020-12-15
Attending: EMERGENCY MEDICINE

## 2020-12-15 VITALS
HEART RATE: 90 BPM | RESPIRATION RATE: 20 BRPM | OXYGEN SATURATION: 95 % | TEMPERATURE: 99.3 F | DIASTOLIC BLOOD PRESSURE: 74 MMHG | SYSTOLIC BLOOD PRESSURE: 114 MMHG

## 2020-12-15 DIAGNOSIS — J06.9 ACUTE UPPER RESPIRATORY INFECTION: Primary | ICD-10-CM

## 2020-12-15 DIAGNOSIS — E11.9 TYPE 2 DIABETES MELLITUS WITHOUT COMPLICATION, UNSPECIFIED WHETHER LONG TERM INSULIN USE (HCC): ICD-10-CM

## 2020-12-15 DIAGNOSIS — Z20.822 SUSPECTED COVID-19 VIRUS INFECTION: ICD-10-CM

## 2020-12-15 LAB
ALBUMIN SERPL-MCNC: 3.1 G/DL (ref 3.5–5)
ALBUMIN/GLOB SERPL: 0.6 {RATIO} (ref 1.2–3.5)
ALP SERPL-CCNC: 96 U/L (ref 50–136)
ALT SERPL-CCNC: 146 U/L (ref 12–65)
ANION GAP SERPL CALC-SCNC: 6 MMOL/L (ref 7–16)
AST SERPL-CCNC: 104 U/L (ref 15–37)
BASOPHILS # BLD: 0 K/UL (ref 0–0.2)
BASOPHILS NFR BLD: 0 % (ref 0–2)
BILIRUB SERPL-MCNC: 0.3 MG/DL (ref 0.2–1.1)
BUN SERPL-MCNC: 12 MG/DL (ref 6–23)
CALCIUM SERPL-MCNC: 8.7 MG/DL (ref 8.3–10.4)
CHLORIDE SERPL-SCNC: 104 MMOL/L (ref 98–107)
CO2 SERPL-SCNC: 28 MMOL/L (ref 21–32)
CREAT SERPL-MCNC: 0.69 MG/DL (ref 0.6–1)
DIFFERENTIAL METHOD BLD: ABNORMAL
EOSINOPHIL # BLD: 0 K/UL (ref 0–0.8)
EOSINOPHIL NFR BLD: 0 % (ref 0.5–7.8)
ERYTHROCYTE [DISTWIDTH] IN BLOOD BY AUTOMATED COUNT: 13.2 % (ref 11.9–14.6)
GLOBULIN SER CALC-MCNC: 4.8 G/DL (ref 2.3–3.5)
GLUCOSE SERPL-MCNC: 303 MG/DL (ref 65–100)
HCT VFR BLD AUTO: 44.9 % (ref 35.8–46.3)
HGB BLD-MCNC: 14.7 G/DL (ref 11.7–15.4)
IMM GRANULOCYTES # BLD AUTO: 0 K/UL (ref 0–0.5)
IMM GRANULOCYTES NFR BLD AUTO: 0 % (ref 0–5)
LYMPHOCYTES # BLD: 2.2 K/UL (ref 0.5–4.6)
LYMPHOCYTES NFR BLD: 49 % (ref 13–44)
MCH RBC QN AUTO: 29.6 PG (ref 26.1–32.9)
MCHC RBC AUTO-ENTMCNC: 32.7 G/DL (ref 31.4–35)
MCV RBC AUTO: 90.5 FL (ref 79.6–97.8)
MONOCYTES # BLD: 0.4 K/UL (ref 0.1–1.3)
MONOCYTES NFR BLD: 10 % (ref 4–12)
NEUTS SEG # BLD: 1.8 K/UL (ref 1.7–8.2)
NEUTS SEG NFR BLD: 41 % (ref 43–78)
NRBC # BLD: 0 K/UL (ref 0–0.2)
PLATELET # BLD AUTO: 171 K/UL (ref 150–450)
PLATELET COMMENTS,PCOM: ADEQUATE
PMV BLD AUTO: 9.9 FL (ref 9.4–12.3)
POTASSIUM SERPL-SCNC: 3.7 MMOL/L (ref 3.5–5.1)
PROT SERPL-MCNC: 7.9 G/DL (ref 6.3–8.2)
RBC # BLD AUTO: 4.96 M/UL (ref 4.05–5.2)
RBC MORPH BLD: ABNORMAL
RBC MORPH BLD: ABNORMAL
SODIUM SERPL-SCNC: 138 MMOL/L (ref 136–145)
WBC # BLD AUTO: 4.4 K/UL (ref 4.3–11.1)
WBC MORPH BLD: ABNORMAL

## 2020-12-15 PROCEDURE — 71046 X-RAY EXAM CHEST 2 VIEWS: CPT

## 2020-12-15 PROCEDURE — 99283 EMERGENCY DEPT VISIT LOW MDM: CPT

## 2020-12-15 PROCEDURE — 85025 COMPLETE CBC W/AUTO DIFF WBC: CPT

## 2020-12-15 PROCEDURE — 87635 SARS-COV-2 COVID-19 AMP PRB: CPT

## 2020-12-15 PROCEDURE — 80053 COMPREHEN METABOLIC PANEL: CPT

## 2020-12-15 RX ORDER — BENZONATATE 100 MG/1
200 CAPSULE ORAL
Qty: 30 CAP | Refills: 1 | Status: SHIPPED | OUTPATIENT
Start: 2020-12-15 | End: 2020-12-23

## 2020-12-15 RX ORDER — ONDANSETRON 4 MG/1
4 TABLET, ORALLY DISINTEGRATING ORAL
Qty: 8 TAB | Refills: 2 | Status: SHIPPED | OUTPATIENT
Start: 2020-12-15

## 2020-12-15 RX ORDER — PROMETHAZINE HYDROCHLORIDE 25 MG/1
25 TABLET ORAL
Qty: 15 TAB | Refills: 2 | Status: SHIPPED | OUTPATIENT
Start: 2020-12-15

## 2020-12-15 NOTE — PROGRESS NOTES
[facility][1] staff  available over the phone from 3:30 p.m. - midnight. Please call [place] at [phone] with any interpreting requests.       [facility]  Language Services Department  [address]  [address]  [phone] (phone)

## 2020-12-15 NOTE — ED TRIAGE NOTES
[facility]  for triage. Patient reports fever, cough, and not breathing normally since last night. Reports fatigue. Denies being around anyone with COVID. States she has had nausea and vomiting. Denies diarrhea.

## 2020-12-16 LAB
SARS COV-2, XPGCVT: POSITIVE
SOURCE, COVRS: ABNORMAL

## 2020-12-16 NOTE — PROGRESS NOTES
present over the phone for bedside registration.       [address] [facility]  Language Services Department  [address] [facility]  [id number] [phone] (phone)

## 2020-12-16 NOTE — ED NOTES
I have reviewed discharge instructions with the patient. The patient verbalized understanding. Patient left ED via Discharge Method: ambulatory to Home . Opportunity for questions and clarification provided. Patient given 3 scripts. To continue your aftercare when you leave the hospital, you may receive an automated call from our care team to check in on how you are doing. This is a free service and part of our promise to provide the best care and service to meet your aftercare needs.  If you have questions, or wish to unsubscribe from this service please call [phone]. Thank you for Choosing our [facility] Emergency Department.

## 2020-12-16 NOTE — ED PROVIDER NOTES
Patient has a history of type 2 diabetes, hyperlipidemia and GERD, states she started having fever cough and shortness of breath last night. She also has had fatigue. She has had nausea and vomiting, denies any diarrhea. She has felt warm though she has not taken her temperature. She denies any known sick contacts. She denies any orthopnea. She has had some chest pain when she coughs. She has not taken any medicine for symptoms, denies any aggravating or alleviating factors.            Past Medical History:   Diagnosis Date    Diabetes ([facility] [address]     oral agents only, 11/2019 A1C 8.5,  fbs avg -125----denies hypoglycemic episodes    GERD (gastroesophageal reflux disease)     listed in h/o but patient denies    Hypercholesterolemia     Kidney stone        Past Surgical History:   Procedure Laterality Date    CYSTOSCOPY,INSERT URETERAL STENT Right 11/2019    HX DILATION AND CURETTAGE           Family History:   Problem Relation Age of Onset    Diabetes Mother     Diabetes Father        Social History     Socioeconomic History    Marital status: LEGALLY      Spouse name: Not on file    Number of children: Not on file    Years of education: Not on file    Highest education level: Not on file   Occupational History    Not on file   Social Needs    Financial resource strain: Not on file    Food insecurity     Worry: Not on file     Inability: Not on file    Transportation needs     Medical: Not on file     Non-medical: Not on file   Tobacco Use    Smoking status: Never Smoker    Smokeless tobacco: Never Used   Substance and Sexual Activity    Alcohol use: Not Currently    Drug use: Never    Sexual activity: Not on file   Lifestyle    Physical activity     Days per week: Not on file     Minutes per session: Not on file    Stress: Not on file   Relationships    Social connections     Talks on phone: Not on file     Gets together: Not on file     Attends Mu-ism service: Not on file Active member of club or organization: Not on file     Attends meetings of clubs or organizations: Not on file     Relationship status: Not on file    Intimate partner violence     Fear of current or ex partner: Not on file     Emotionally abused: Not on file     Physically abused: Not on file     Forced sexual activity: Not on file   Other Topics Concern    Not on file   Social History Narrative    Not on file         ALLERGIES: Patient has no known allergies. Review of Systems   Constitutional: Positive for chills. Negative for fever. Respiratory: Positive for cough and shortness of breath. Gastrointestinal: Positive for diarrhea and nausea. Negative for vomiting. All other systems reviewed and are negative. Vitals:    12/15/20 1803   BP: 125/78   Pulse: 97   Resp: 22   Temp: 99.3 °F (37.4 °C)   SpO2: 95%            Physical Exam  Vitals signs and nursing note reviewed. Constitutional:       Appearance: Normal appearance. She is well-developed. HENT:      Head: Normocephalic and atraumatic. Eyes:      Conjunctiva/sclera: Conjunctivae normal.      Pupils: Pupils are equal, round, and reactive to light. Neck:      Musculoskeletal: Normal range of motion and neck supple. Cardiovascular:      Rate and Rhythm: Normal rate and regular rhythm. Pulmonary:      Effort: Pulmonary effort is normal. No respiratory distress. Breath sounds: No wheezing or rales. Abdominal:      General: There is no distension. Palpations: Abdomen is soft. Tenderness: There is no abdominal tenderness. Musculoskeletal:         General: No tenderness. Right lower leg: No edema. Left lower leg: No edema. Skin:     General: Skin is warm and dry. Neurological:      Mental Status: She is alert.    Psychiatric:         Behavior: Behavior normal.          MDM  Number of Diagnoses or Management Options  Acute upper respiratory infection: new and does not require workup  Suspected COVID-19 virus infection: new and does not require workup  Type 2 diabetes mellitus without complication, unspecified whether long term insulin use [facility]):   Diagnosis management comments: 9:03 PM discussed results with patient, concern for Covid. She will be tested and the results called to her. Discussed in detail the need for her to isolate herself until she hears back from us.        Amount and/or Complexity of Data Reviewed  Clinical lab tests: ordered and reviewed  Tests in the radiology section of CPT®: ordered and reviewed    Risk of Complications, Morbidity, and/or Mortality  Presenting problems: moderate  Diagnostic procedures: moderate  Management options: moderate    Patient Progress  Patient progress: stable         Procedures

## 2020-12-16 NOTE — DISCHARGE INSTRUCTIONS

## 2020-12-17 NOTE — PROGRESS NOTES
12/17/20 Patient notified of positive Covid 19 result;  present on call; home call instructions discussed; questions answered

## 2020-12-17 NOTE — PROGRESS NOTES
12/17/20 1st attempt to notify patient of positive Covid 19 result;  present on call; no voicemail set up; unable to leave message

## 2020-12-19 ENCOUNTER — APPOINTMENT (OUTPATIENT)
Dept: GENERAL RADIOLOGY | Age: 40
DRG: 871 | End: 2020-12-19
Attending: EMERGENCY MEDICINE

## 2020-12-19 ENCOUNTER — APPOINTMENT (OUTPATIENT)
Dept: CT IMAGING | Age: 40
DRG: 871 | End: 2020-12-19
Attending: EMERGENCY MEDICINE

## 2020-12-19 ENCOUNTER — HOSPITAL ENCOUNTER (INPATIENT)
Age: 40
LOS: 4 days | Discharge: HOME | DRG: 871 | End: 2020-12-23
Attending: EMERGENCY MEDICINE | Admitting: FAMILY MEDICINE

## 2020-12-19 DIAGNOSIS — R11.2 NAUSEA AND VOMITING, INTRACTABILITY OF VOMITING NOT SPECIFIED, UNSPECIFIED VOMITING TYPE: ICD-10-CM

## 2020-12-19 DIAGNOSIS — U07.1 COVID-19 VIRUS INFECTION: Primary | ICD-10-CM

## 2020-12-19 DIAGNOSIS — R50.9 FEVER, UNSPECIFIED FEVER CAUSE: ICD-10-CM

## 2020-12-19 DIAGNOSIS — R10.31 ABDOMINAL PAIN, RIGHT LOWER QUADRANT: ICD-10-CM

## 2020-12-19 PROBLEM — J96.01 ACUTE RESPIRATORY FAILURE WITH HYPOXIA (HCC): Status: ACTIVE | Noted: 2020-12-19

## 2020-12-19 LAB
ALBUMIN SERPL-MCNC: 3.1 G/DL (ref 3.5–5)
ALBUMIN/GLOB SERPL: 0.6 {RATIO} (ref 1.2–3.5)
ALP SERPL-CCNC: 105 U/L (ref 50–136)
ALT SERPL-CCNC: 91 U/L (ref 12–65)
ANION GAP SERPL CALC-SCNC: 6 MMOL/L (ref 7–16)
APPEARANCE UR: CLEAR
AST SERPL-CCNC: 68 U/L (ref 15–37)
BACTERIA URNS QL MICRO: 0 /HPF
BASOPHILS # BLD: 0 K/UL (ref 0–0.2)
BASOPHILS NFR BLD: 0 % (ref 0–2)
BILIRUB SERPL-MCNC: 0.4 MG/DL (ref 0.2–1.1)
BILIRUB UR QL: NEGATIVE
BUN SERPL-MCNC: 7 MG/DL (ref 6–23)
CALCIUM SERPL-MCNC: 8.3 MG/DL (ref 8.3–10.4)
CASTS URNS QL MICRO: ABNORMAL /LPF
CHLORIDE SERPL-SCNC: 103 MMOL/L (ref 98–107)
CO2 SERPL-SCNC: 30 MMOL/L (ref 21–32)
COLOR UR: YELLOW
CREAT SERPL-MCNC: 0.72 MG/DL (ref 0.6–1)
DIFFERENTIAL METHOD BLD: ABNORMAL
EOSINOPHIL # BLD: 0 K/UL (ref 0–0.8)
EOSINOPHIL NFR BLD: 0 % (ref 0.5–7.8)
EPI CELLS #/AREA URNS HPF: ABNORMAL /HPF
ERYTHROCYTE [DISTWIDTH] IN BLOOD BY AUTOMATED COUNT: 12.8 % (ref 11.9–14.6)
GLOBULIN SER CALC-MCNC: 5.2 G/DL (ref 2.3–3.5)
GLUCOSE SERPL-MCNC: 203 MG/DL (ref 65–100)
GLUCOSE UR STRIP.AUTO-MCNC: 100 MG/DL
HCT VFR BLD AUTO: 47.4 % (ref 35.8–46.3)
HGB BLD-MCNC: 15.5 G/DL (ref 11.7–15.4)
HGB UR QL STRIP: NEGATIVE
IMM GRANULOCYTES # BLD AUTO: 0 K/UL (ref 0–0.5)
IMM GRANULOCYTES NFR BLD AUTO: 0 % (ref 0–5)
KETONES UR QL STRIP.AUTO: ABNORMAL MG/DL
LACTATE SERPL-SCNC: 1 MMOL/L (ref 0.4–2)
LACTATE SERPL-SCNC: 1.7 MMOL/L (ref 0.4–2)
LEUKOCYTE ESTERASE UR QL STRIP.AUTO: NEGATIVE
LIPASE SERPL-CCNC: 163 U/L (ref 73–393)
LYMPHOCYTES # BLD: 2 K/UL (ref 0.5–4.6)
LYMPHOCYTES NFR BLD: 33 % (ref 13–44)
MCH RBC QN AUTO: 29.2 PG (ref 26.1–32.9)
MCHC RBC AUTO-ENTMCNC: 32.7 G/DL (ref 31.4–35)
MCV RBC AUTO: 89.3 FL (ref 79.6–97.8)
MONOCYTES # BLD: 0.4 K/UL (ref 0.1–1.3)
MONOCYTES NFR BLD: 8 % (ref 4–12)
NEUTS SEG # BLD: 3.5 K/UL (ref 1.7–8.2)
NEUTS SEG NFR BLD: 59 % (ref 43–78)
NITRITE UR QL STRIP.AUTO: NEGATIVE
NRBC # BLD: 0 K/UL (ref 0–0.2)
PH UR STRIP: 8 [PH] (ref 5–9)
PLATELET # BLD AUTO: 254 K/UL (ref 150–450)
PMV BLD AUTO: 9 FL (ref 9.4–12.3)
POTASSIUM SERPL-SCNC: 3.6 MMOL/L (ref 3.5–5.1)
PROT SERPL-MCNC: 8.3 G/DL (ref 6.3–8.2)
PROT UR STRIP-MCNC: 100 MG/DL
RBC # BLD AUTO: 5.31 M/UL (ref 4.05–5.2)
RBC #/AREA URNS HPF: ABNORMAL /HPF
SODIUM SERPL-SCNC: 139 MMOL/L (ref 136–145)
SP GR UR REFRACTOMETRY: 1.02 (ref 1–1.02)
UROBILINOGEN UR QL STRIP.AUTO: 1 EU/DL (ref 0.2–1)
WBC # BLD AUTO: 5.9 K/UL (ref 4.3–11.1)
WBC URNS QL MICRO: ABNORMAL /HPF

## 2020-12-19 PROCEDURE — 74011000636 HC RX REV CODE- 636: Performed by: EMERGENCY MEDICINE

## 2020-12-19 PROCEDURE — 80053 COMPREHEN METABOLIC PANEL: CPT

## 2020-12-19 PROCEDURE — 99285 EMERGENCY DEPT VISIT HI MDM: CPT

## 2020-12-19 PROCEDURE — 74011000258 HC RX REV CODE- 258: Performed by: EMERGENCY MEDICINE

## 2020-12-19 PROCEDURE — 74177 CT ABD & PELVIS W/CONTRAST: CPT

## 2020-12-19 PROCEDURE — 96365 THER/PROPH/DIAG IV INF INIT: CPT

## 2020-12-19 PROCEDURE — 83605 ASSAY OF LACTIC ACID: CPT

## 2020-12-19 PROCEDURE — 93005 ELECTROCARDIOGRAM TRACING: CPT | Performed by: EMERGENCY MEDICINE

## 2020-12-19 PROCEDURE — 74011250636 HC RX REV CODE- 250/636: Performed by: EMERGENCY MEDICINE

## 2020-12-19 PROCEDURE — 83690 ASSAY OF LIPASE: CPT

## 2020-12-19 PROCEDURE — 85025 COMPLETE CBC W/AUTO DIFF WBC: CPT

## 2020-12-19 PROCEDURE — 96361 HYDRATE IV INFUSION ADD-ON: CPT

## 2020-12-19 PROCEDURE — 71045 X-RAY EXAM CHEST 1 VIEW: CPT

## 2020-12-19 PROCEDURE — 81001 URINALYSIS AUTO W/SCOPE: CPT

## 2020-12-19 PROCEDURE — 96375 TX/PRO/DX INJ NEW DRUG ADDON: CPT

## 2020-12-19 PROCEDURE — 65270000029 HC RM PRIVATE

## 2020-12-19 PROCEDURE — 87040 BLOOD CULTURE FOR BACTERIA: CPT

## 2020-12-19 RX ORDER — KETOROLAC TROMETHAMINE 30 MG/ML
30 INJECTION, SOLUTION INTRAMUSCULAR; INTRAVENOUS
Status: COMPLETED | OUTPATIENT
Start: 2020-12-19 | End: 2020-12-19

## 2020-12-19 RX ORDER — SODIUM CHLORIDE 0.9 % (FLUSH) 0.9 %
10 SYRINGE (ML) INJECTION
Status: COMPLETED | OUTPATIENT
Start: 2020-12-19 | End: 2020-12-19

## 2020-12-19 RX ADMIN — IOPAMIDOL 100 ML: 755 INJECTION, SOLUTION INTRAVENOUS at 22:53

## 2020-12-19 RX ADMIN — AZITHROMYCIN 500 MG: 500 INJECTION, POWDER, LYOPHILIZED, FOR SOLUTION INTRAVENOUS at 22:12

## 2020-12-19 RX ADMIN — SODIUM CHLORIDE 100 ML: 900 INJECTION, SOLUTION INTRAVENOUS at 22:53

## 2020-12-19 RX ADMIN — SODIUM CHLORIDE 1000 ML: 900 INJECTION, SOLUTION INTRAVENOUS at 20:05

## 2020-12-19 RX ADMIN — CEFTRIAXONE SODIUM 1 G: 1 INJECTION, POWDER, FOR SOLUTION INTRAMUSCULAR; INTRAVENOUS at 21:42

## 2020-12-19 RX ADMIN — Medication 10 ML: at 23:36

## 2020-12-19 RX ADMIN — KETOROLAC TROMETHAMINE 30 MG: 30 INJECTION, SOLUTION INTRAMUSCULAR at 20:49

## 2020-12-19 NOTE — ED TRIAGE NOTES
Pt to triage via w/c. Pt states received positive COVID result yesterday. Pt has fever, cough, SOB, bodyaches. Pt took tylenol about 4 hours ago.

## 2020-12-20 PROBLEM — U07.1 COVID-19: Status: ACTIVE | Noted: 2020-12-20

## 2020-12-20 PROBLEM — K29.70 VIRAL GASTRITIS: Status: ACTIVE | Noted: 2020-12-20

## 2020-12-20 LAB
ALBUMIN SERPL-MCNC: 2.5 G/DL (ref 3.5–5)
ALBUMIN/GLOB SERPL: 0.6 {RATIO} (ref 1.2–3.5)
ALP SERPL-CCNC: 83 U/L (ref 50–136)
ALT SERPL-CCNC: 68 U/L (ref 12–65)
ANION GAP SERPL CALC-SCNC: 5 MMOL/L (ref 7–16)
ANION GAP SERPL CALC-SCNC: 5 MMOL/L (ref 7–16)
AST SERPL-CCNC: 49 U/L (ref 15–37)
ATRIAL RATE: 120 BPM
BASOPHILS # BLD: 0 K/UL (ref 0–0.2)
BASOPHILS NFR BLD: 0 % (ref 0–2)
BILIRUB SERPL-MCNC: 0.5 MG/DL (ref 0.2–1.1)
BUN SERPL-MCNC: 8 MG/DL (ref 6–23)
BUN SERPL-MCNC: 8 MG/DL (ref 6–23)
CALCIUM SERPL-MCNC: 7.3 MG/DL (ref 8.3–10.4)
CALCIUM SERPL-MCNC: 7.5 MG/DL (ref 8.3–10.4)
CALCULATED R AXIS, ECG10: 35 DEGREES
CALCULATED T AXIS, ECG11: 33 DEGREES
CHLORIDE SERPL-SCNC: 105 MMOL/L (ref 98–107)
CHLORIDE SERPL-SCNC: 107 MMOL/L (ref 98–107)
CO2 SERPL-SCNC: 28 MMOL/L (ref 21–32)
CO2 SERPL-SCNC: 29 MMOL/L (ref 21–32)
CREAT SERPL-MCNC: 0.47 MG/DL (ref 0.6–1)
CREAT SERPL-MCNC: 0.57 MG/DL (ref 0.6–1)
CRP SERPL HS-MCNC: 52.8 MG/L
D DIMER PPP FEU-MCNC: 0.31 UG/ML(FEU)
DIAGNOSIS, 93000: NORMAL
DIFFERENTIAL METHOD BLD: ABNORMAL
EOSINOPHIL # BLD: 0 K/UL (ref 0–0.8)
EOSINOPHIL NFR BLD: 0 % (ref 0.5–7.8)
ERYTHROCYTE [DISTWIDTH] IN BLOOD BY AUTOMATED COUNT: 13 % (ref 11.9–14.6)
EST. AVERAGE GLUCOSE BLD GHB EST-MCNC: 275 MG/DL
FERRITIN SERPL-MCNC: 120 NG/ML (ref 8–388)
GLOBULIN SER CALC-MCNC: 3.9 G/DL (ref 2.3–3.5)
GLUCOSE BLD STRIP.AUTO-MCNC: 177 MG/DL (ref 65–100)
GLUCOSE BLD STRIP.AUTO-MCNC: 209 MG/DL (ref 65–100)
GLUCOSE BLD STRIP.AUTO-MCNC: 309 MG/DL (ref 65–100)
GLUCOSE BLD STRIP.AUTO-MCNC: 359 MG/DL (ref 65–100)
GLUCOSE SERPL-MCNC: 161 MG/DL (ref 65–100)
GLUCOSE SERPL-MCNC: 162 MG/DL (ref 65–100)
HBA1C MFR BLD: 11.2 % (ref 4.8–6)
HCT VFR BLD AUTO: 42.7 % (ref 35.8–46.3)
HGB BLD-MCNC: 13.8 G/DL (ref 11.7–15.4)
HIV 1+2 AB+HIV1 P24 AG SERPL QL IA: NONREACTIVE
HIV12 RESULT COMMENT, HHIVC: ABNORMAL
IMM GRANULOCYTES # BLD AUTO: 0 K/UL (ref 0–0.5)
IMM GRANULOCYTES NFR BLD AUTO: 0 % (ref 0–5)
LDH SERPL L TO P-CCNC: 221 U/L (ref 100–190)
LYMPHOCYTES # BLD: 1.9 K/UL (ref 0.5–4.6)
LYMPHOCYTES NFR BLD: 38 % (ref 13–44)
MAGNESIUM SERPL-MCNC: 1.8 MG/DL (ref 1.8–2.4)
MAGNESIUM SERPL-MCNC: 1.9 MG/DL (ref 1.8–2.4)
MCH RBC QN AUTO: 29.2 PG (ref 26.1–32.9)
MCHC RBC AUTO-ENTMCNC: 32.3 G/DL (ref 31.4–35)
MCV RBC AUTO: 90.3 FL (ref 79.6–97.8)
MONOCYTES # BLD: 0.4 K/UL (ref 0.1–1.3)
MONOCYTES NFR BLD: 9 % (ref 4–12)
NEUTS SEG # BLD: 2.6 K/UL (ref 1.7–8.2)
NEUTS SEG NFR BLD: 53 % (ref 43–78)
NRBC # BLD: 0 K/UL (ref 0–0.2)
P-R INTERVAL, ECG05: 96 MS
PHOSPHATE SERPL-MCNC: 2.7 MG/DL (ref 2.5–4.5)
PHOSPHATE SERPL-MCNC: 2.9 MG/DL (ref 2.5–4.5)
PLATELET # BLD AUTO: 216 K/UL (ref 150–450)
PLATELET COMMENTS,PCOM: ADEQUATE
PMV BLD AUTO: 9.6 FL (ref 9.4–12.3)
POTASSIUM SERPL-SCNC: 3.5 MMOL/L (ref 3.5–5.1)
POTASSIUM SERPL-SCNC: 3.8 MMOL/L (ref 3.5–5.1)
PROCALCITONIN SERPL-MCNC: <0.05 NG/ML
PROT SERPL-MCNC: 6.4 G/DL (ref 6.3–8.2)
Q-T INTERVAL, ECG07: 432 MS
QRS DURATION, ECG06: 68 MS
QTC CALCULATION (BEZET), ECG08: 610 MS
RBC # BLD AUTO: 4.73 M/UL (ref 4.05–5.2)
RBC MORPH BLD: ABNORMAL
SODIUM SERPL-SCNC: 138 MMOL/L (ref 136–145)
SODIUM SERPL-SCNC: 141 MMOL/L (ref 136–145)
VENTRICULAR RATE, ECG03: 120 BPM
WBC # BLD AUTO: 4.9 K/UL (ref 4.3–11.1)
WBC MORPH BLD: ABNORMAL

## 2020-12-20 PROCEDURE — 83036 HEMOGLOBIN GLYCOSYLATED A1C: CPT

## 2020-12-20 PROCEDURE — 85379 FIBRIN DEGRADATION QUANT: CPT

## 2020-12-20 PROCEDURE — 74011250637 HC RX REV CODE- 250/637: Performed by: FAMILY MEDICINE

## 2020-12-20 PROCEDURE — 82962 GLUCOSE BLOOD TEST: CPT

## 2020-12-20 PROCEDURE — 36415 COLL VENOUS BLD VENIPUNCTURE: CPT

## 2020-12-20 PROCEDURE — 94640 AIRWAY INHALATION TREATMENT: CPT

## 2020-12-20 PROCEDURE — 93005 ELECTROCARDIOGRAM TRACING: CPT | Performed by: FAMILY MEDICINE

## 2020-12-20 PROCEDURE — 94760 N-INVAS EAR/PLS OXIMETRY 1: CPT

## 2020-12-20 PROCEDURE — 65270000029 HC RM PRIVATE

## 2020-12-20 PROCEDURE — 87389 HIV-1 AG W/HIV-1&-2 AB AG IA: CPT

## 2020-12-20 PROCEDURE — 74011636637 HC RX REV CODE- 636/637: Performed by: FAMILY MEDICINE

## 2020-12-20 PROCEDURE — 74011000250 HC RX REV CODE- 250: Performed by: FAMILY MEDICINE

## 2020-12-20 PROCEDURE — 74011000258 HC RX REV CODE- 258: Performed by: FAMILY MEDICINE

## 2020-12-20 PROCEDURE — 74011250636 HC RX REV CODE- 250/636: Performed by: FAMILY MEDICINE

## 2020-12-20 PROCEDURE — 85025 COMPLETE CBC W/AUTO DIFF WBC: CPT

## 2020-12-20 PROCEDURE — 83615 LACTATE (LD) (LDH) ENZYME: CPT

## 2020-12-20 PROCEDURE — XW033E5 INTRODUCTION OF REMDESIVIR ANTI-INFECTIVE INTO PERIPHERAL VEIN, PERCUTANEOUS APPROACH, NEW TECHNOLOGY GROUP 5: ICD-10-PCS | Performed by: FAMILY MEDICINE

## 2020-12-20 PROCEDURE — 2709999900 HC NON-CHARGEABLE SUPPLY

## 2020-12-20 PROCEDURE — 84145 PROCALCITONIN (PCT): CPT

## 2020-12-20 PROCEDURE — 86141 C-REACTIVE PROTEIN HS: CPT

## 2020-12-20 PROCEDURE — 83735 ASSAY OF MAGNESIUM: CPT

## 2020-12-20 PROCEDURE — 82728 ASSAY OF FERRITIN: CPT

## 2020-12-20 PROCEDURE — 80053 COMPREHEN METABOLIC PANEL: CPT

## 2020-12-20 PROCEDURE — 84100 ASSAY OF PHOSPHORUS: CPT

## 2020-12-20 RX ORDER — ADHESIVE BANDAGE
30 BANDAGE TOPICAL DAILY PRN
Status: DISCONTINUED | OUTPATIENT
Start: 2020-12-20 | End: 2020-12-23 | Stop reason: HOSPADM

## 2020-12-20 RX ORDER — ENOXAPARIN SODIUM 100 MG/ML
30 INJECTION SUBCUTANEOUS EVERY 12 HOURS
Status: DISCONTINUED | OUTPATIENT
Start: 2020-12-21 | End: 2020-12-23 | Stop reason: HOSPADM

## 2020-12-20 RX ORDER — INSULIN LISPRO 100 [IU]/ML
INJECTION, SOLUTION INTRAVENOUS; SUBCUTANEOUS
Status: DISCONTINUED | OUTPATIENT
Start: 2020-12-20 | End: 2020-12-23 | Stop reason: HOSPADM

## 2020-12-20 RX ORDER — ENOXAPARIN SODIUM 100 MG/ML
40 INJECTION SUBCUTANEOUS EVERY 24 HOURS
Status: DISCONTINUED | OUTPATIENT
Start: 2020-12-20 | End: 2020-12-20

## 2020-12-20 RX ORDER — IPRATROPIUM BROMIDE AND ALBUTEROL SULFATE 2.5; .5 MG/3ML; MG/3ML
3 SOLUTION RESPIRATORY (INHALATION)
Status: DISCONTINUED | OUTPATIENT
Start: 2020-12-20 | End: 2020-12-20

## 2020-12-20 RX ORDER — ALBUTEROL SULFATE 90 UG/1
2 AEROSOL, METERED RESPIRATORY (INHALATION)
Status: DISCONTINUED | OUTPATIENT
Start: 2020-12-20 | End: 2020-12-20

## 2020-12-20 RX ORDER — ACETAMINOPHEN 325 MG/1
650 TABLET ORAL
Status: DISCONTINUED | OUTPATIENT
Start: 2020-12-20 | End: 2020-12-23 | Stop reason: HOSPADM

## 2020-12-20 RX ORDER — DEXAMETHASONE SODIUM PHOSPHATE 100 MG/10ML
6 INJECTION INTRAMUSCULAR; INTRAVENOUS DAILY
Status: DISCONTINUED | OUTPATIENT
Start: 2020-12-20 | End: 2020-12-23 | Stop reason: HOSPADM

## 2020-12-20 RX ORDER — SODIUM CHLORIDE 9 MG/ML
75 INJECTION, SOLUTION INTRAVENOUS CONTINUOUS
Status: DISCONTINUED | OUTPATIENT
Start: 2020-12-20 | End: 2020-12-22

## 2020-12-20 RX ORDER — ASCORBIC ACID 500 MG
1000 TABLET ORAL 2 TIMES DAILY
Status: DISCONTINUED | OUTPATIENT
Start: 2020-12-20 | End: 2020-12-23 | Stop reason: HOSPADM

## 2020-12-20 RX ORDER — ALBUTEROL SULFATE 90 UG/1
2 AEROSOL, METERED RESPIRATORY (INHALATION)
Status: DISCONTINUED | OUTPATIENT
Start: 2020-12-20 | End: 2020-12-22

## 2020-12-20 RX ORDER — UREA 10 %
220 LOTION (ML) TOPICAL DAILY
Status: DISCONTINUED | OUTPATIENT
Start: 2020-12-20 | End: 2020-12-23 | Stop reason: HOSPADM

## 2020-12-20 RX ORDER — SODIUM CHLORIDE 0.9 % (FLUSH) 0.9 %
5-40 SYRINGE (ML) INJECTION EVERY 8 HOURS
Status: DISCONTINUED | OUTPATIENT
Start: 2020-12-20 | End: 2020-12-23 | Stop reason: HOSPADM

## 2020-12-20 RX ORDER — SODIUM CHLORIDE 0.9 % (FLUSH) 0.9 %
5-40 SYRINGE (ML) INJECTION AS NEEDED
Status: DISCONTINUED | OUTPATIENT
Start: 2020-12-20 | End: 2020-12-23 | Stop reason: HOSPADM

## 2020-12-20 RX ADMIN — FAMOTIDINE 20 MG: 10 INJECTION INTRAVENOUS at 08:35

## 2020-12-20 RX ADMIN — CEFTRIAXONE SODIUM 1 G: 1 INJECTION, POWDER, FOR SOLUTION INTRAMUSCULAR; INTRAVENOUS at 20:19

## 2020-12-20 RX ADMIN — Medication 220 MG: at 08:34

## 2020-12-20 RX ADMIN — ENOXAPARIN SODIUM 40 MG: 40 INJECTION SUBCUTANEOUS at 08:34

## 2020-12-20 RX ADMIN — REMDESIVIR 200 MG: 100 INJECTION, POWDER, LYOPHILIZED, FOR SOLUTION INTRAVENOUS at 11:16

## 2020-12-20 RX ADMIN — INSULIN LISPRO 8 UNITS: 100 INJECTION, SOLUTION INTRAVENOUS; SUBCUTANEOUS at 22:19

## 2020-12-20 RX ADMIN — Medication 10 ML: at 06:13

## 2020-12-20 RX ADMIN — Medication 5 ML: at 13:20

## 2020-12-20 RX ADMIN — ACETAMINOPHEN 650 MG: 325 TABLET, FILM COATED ORAL at 20:19

## 2020-12-20 RX ADMIN — INSULIN LISPRO 4 UNITS: 100 INJECTION, SOLUTION INTRAVENOUS; SUBCUTANEOUS at 11:16

## 2020-12-20 RX ADMIN — ALBUTEROL SULFATE 2 PUFF: 108 AEROSOL, METERED RESPIRATORY (INHALATION) at 09:05

## 2020-12-20 RX ADMIN — ALBUTEROL SULFATE 2 PUFF: 108 INHALANT RESPIRATORY (INHALATION) at 14:23

## 2020-12-20 RX ADMIN — ASCORBIC ACID 1.5 G: 500 INJECTION INTRAVENOUS at 05:05

## 2020-12-20 RX ADMIN — INSULIN LISPRO 10 UNITS: 100 INJECTION, SOLUTION INTRAVENOUS; SUBCUTANEOUS at 16:48

## 2020-12-20 RX ADMIN — SODIUM CHLORIDE 75 ML/HR: 900 INJECTION, SOLUTION INTRAVENOUS at 17:57

## 2020-12-20 RX ADMIN — FAMOTIDINE 20 MG: 10 INJECTION INTRAVENOUS at 20:20

## 2020-12-20 RX ADMIN — AZITHROMYCIN MONOHYDRATE 500 MG: 500 INJECTION, POWDER, LYOPHILIZED, FOR SOLUTION INTRAVENOUS at 22:23

## 2020-12-20 RX ADMIN — ACETAMINOPHEN 650 MG: 325 TABLET, FILM COATED ORAL at 11:02

## 2020-12-20 RX ADMIN — DEXAMETHASONE SODIUM PHOSPHATE 6 MG: 10 INJECTION INTRAMUSCULAR; INTRAVENOUS at 08:35

## 2020-12-20 RX ADMIN — Medication 5 ML: at 22:43

## 2020-12-20 RX ADMIN — ASCORBIC ACID 1.5 G: 500 INJECTION INTRAVENOUS at 13:20

## 2020-12-20 RX ADMIN — SODIUM CHLORIDE 75 ML/HR: 900 INJECTION, SOLUTION INTRAVENOUS at 05:04

## 2020-12-20 RX ADMIN — OXYCODONE HYDROCHLORIDE AND ACETAMINOPHEN 1000 MG: 500 TABLET ORAL at 17:03

## 2020-12-20 RX ADMIN — ALBUTEROL SULFATE 2 PUFF: 108 INHALANT RESPIRATORY (INHALATION) at 21:30

## 2020-12-20 NOTE — ED NOTES
TRANSFER - OUT REPORT:    Verbal report given to marina(name) on Kaushik Walton  being transferred to 6(unit) for routine progression of care       Report consisted of patients Situation, Background, Assessment and   Recommendations(SBAR). Information from the following report(s) SBAR, ED Summary, Intake/Output, MAR and Recent Results was reviewed with the receiving nurse. Lines:   Peripheral IV 12/19/20 Left Antecubital (Active)        Opportunity for questions and clarification was provided.       Patient transported with:   O2 @ 2 liters

## 2020-12-20 NOTE — PROGRESS NOTES
Pt. A/O x4. No complaints at this time. Notified MD rhodes. No orders at this time. VVS. Assessment complete. Call bed within reach. Will continue to monitor.

## 2020-12-20 NOTE — H&P
HOSPITALIST INITIAL HISTORY AND PHYSICAL    NAME:  Rabia Clark   Age:  38 y.o.  :   1980   MRN:   [id number]  PCP: Elissa Aceves MD  Consulting MD:  Treatment Team: Attending Provider: Akshat Orourke MD    CHIEF COMPLAINT: vomiting    HISTORY OF PRESENT ILLNESS:   Rabia Clark is a 38 y.o. female with a past medical history of DM type II, GERD who presents to the ER with report of nausea, vomiting, abdominal pain for the past 5 days. She admits to worsening fevers and chills as well. She tested positive for COVID 4 days ago. Admits to feeling a bit better now. Admits to some SOB . REVIEW OF SYSTEMS: Comprehensive ROS performed. Admits to fevers, chills, nausea, vomiting, otherwise negative. Past Medical History:   Diagnosis Date    Diabetes ([address]     oral agents only, 2019 A1C 8.5,  fbs avg -125----denies hypoglycemic episodes    GERD (gastroesophageal reflux disease)     listed in h/o but patient denies    Hypercholesterolemia     Kidney stone         Past Surgical History:   Procedure Laterality Date    CYSTOSCOPY,INSERT URETERAL STENT Right 2019    HX DILATION AND CURETTAGE         Prior to Admission Medications   Prescriptions Last Dose Informant Patient Reported? Taking? SITagliptin (JANUVIA) 100 mg tablet   Yes No   Sig: Take 100 mg by mouth daily. acetaminophen (TYLENOL EXTRA STRENGTH) 500 mg tablet   Yes No   Sig: Take 500 mg by mouth every six (6) hours as needed for Pain. benzonatate (Tessalon Perles) 100 mg capsule   No No   Sig: Take 2 Caps by mouth three (3) times daily as needed for Cough for up to 7 days. ketorolac (TORADOL) 10 mg tablet   No No   Sig: Take 1 Tab by mouth every six (6) hours as needed for Pain. Patient taking differently: Take 10 mg by mouth every six (6) hours as needed for Pain.  Hold for surgery- instructed 1/10/2020   ondansetron (ZOFRAN ODT) 4 mg disintegrating tablet   No No   Sig: Take 1 Tab by mouth every eight (8) hours as needed for Nausea. promethazine (PHENERGAN) 25 mg tablet   No No   Sig: Take 1 Tab by mouth every six (6) hours as needed for Nausea for up to 15 doses. Indications: nausea      Facility-Administered Medications: None       No Known Allergies    FAMILY HISTORY: Reviewed. Negative except   Family History   Problem Relation Age of Onset    Diabetes Mother     Diabetes Father        Social History     Tobacco Use    Smoking status: Never Smoker    Smokeless tobacco: Never Used   Substance Use Topics    Alcohol use: Not Currently         Objective:     Visit Vitals  /65 (BP 1 Location: Right arm, BP Patient Position: Head of bed elevated (Comment degrees); At rest)   Pulse 75   Temp 99.9 °F (37.7 °C)   Resp 25   Ht 5' 6\" (1.676 m)   Wt 84.9 kg (187 lb 3.2 oz)   SpO2 95%   BMI 30.21 kg/m²      Temp (24hrs), Av.5 °F (38.1 °C), Min:98.9 °F (37.2 °C), Max:102.8 °F (39.3 °C)    Oxygen Therapy  O2 Sat (%): 95 % (20 0252)  Pulse via Oximetry: 72 beats per minute (20 0100)  O2 Device: Nasal cannula (20)  O2 Flow Rate (L/min): 2 l/min (20)  Physical Exam:  General:    The patient is a pleasant middle aged female in no acute distress. Head:   Normocephalic/atraumatic. Eyes:  No palpebral pallor or scleral icterus. ENT:  External auricular and nasal exam within normal limits. Mucous membranes are moist.  Neck:  Supple, non-tender, no JVD. Lungs:   No respiratory distress or accessory muscle use. Abdomen:   Non-distended  Extremities: Without clubbing, cyanosis, or edema. Skin:     Normal color, texture, and turgor. No rashes, lesions, or jaundice. Neurologic: CN II-XII grossly intact and symmetrical.     Moving all four extremities well with no focal deficits. Psychiatric: Pleasant demeanor, appropriate affect.  Alert and oriented x 3    Data Review:   Recent Results (from the past 24 hour(s))   CBC WITH AUTOMATED DIFF    Collection Time: 12/19/20  6:58 PM   Result Value Ref Range    WBC 5.9 4.3 - 11.1 K/uL    RBC 5.31 (H) 4.05 - 5.2 M/uL    HGB 15.5 (H) 11.7 - 15.4 g/dL    HCT 47.4 (H) 35.8 - 46.3 %    MCV 89.3 79.6 - 97.8 FL    MCH 29.2 26.1 - 32.9 PG    MCHC 32.7 31.4 - 35.0 g/dL    RDW 12.8 11.9 - 14.6 %    PLATELET 929 411 - 531 K/uL    MPV 9.0 (L) 9.4 - 12.3 FL    ABSOLUTE NRBC 0.00 0.0 - 0.2 K/uL    DF AUTOMATED      NEUTROPHILS 59 43 - 78 %    LYMPHOCYTES 33 13 - 44 %    MONOCYTES 8 4.0 - 12.0 %    EOSINOPHILS 0 (L) 0.5 - 7.8 %    BASOPHILS 0 0.0 - 2.0 %    IMMATURE GRANULOCYTES 0 0.0 - 5.0 %    ABS. NEUTROPHILS 3.5 1.7 - 8.2 K/UL    ABS. LYMPHOCYTES 2.0 0.5 - 4.6 K/UL    ABS. MONOCYTES 0.4 0.1 - 1.3 K/UL    ABS. EOSINOPHILS 0.0 0.0 - 0.8 K/UL    ABS. BASOPHILS 0.0 0.0 - 0.2 K/UL    ABS. IMM. GRANS. 0.0 0.0 - 0.5 K/UL   METABOLIC PANEL, COMPREHENSIVE    Collection Time: 12/19/20  6:58 PM   Result Value Ref Range    Sodium 139 136 - 145 mmol/L    Potassium 3.6 3.5 - 5.1 mmol/L    Chloride 103 98 - 107 mmol/L    CO2 30 21 - 32 mmol/L    Anion gap 6 (L) 7 - 16 mmol/L    Glucose 203 (H) 65 - 100 mg/dL    BUN 7 6 - 23 MG/DL    Creatinine 0.72 0.6 - 1.0 MG/DL    GFR est AA >60 >60 ml/min/1.73m2    GFR est non-AA >60 >60 ml/min/1.73m2    Calcium 8.3 8.3 - 10.4 MG/DL    Bilirubin, total 0.4 0.2 - 1.1 MG/DL    ALT (SGPT) 91 (H) 12 - 65 U/L    AST (SGOT) 68 (H) 15 - 37 U/L    Alk.  phosphatase 105 50 - 136 U/L    Protein, total 8.3 (H) 6.3 - 8.2 g/dL    Albumin 3.1 (L) 3.5 - 5.0 g/dL    Globulin 5.2 (H) 2.3 - 3.5 g/dL    A-G Ratio 0.6 (L) 1.2 - 3.5     LACTIC ACID    Collection Time: 12/19/20  6:58 PM   Result Value Ref Range    Lactic acid 1.7 0.4 - 2.0 MMOL/L   LIPASE    Collection Time: 12/19/20  6:58 PM   Result Value Ref Range    Lipase 163 73 - 393 U/L   URINALYSIS W/ RFLX MICROSCOPIC    Collection Time: 12/19/20  8:54 PM   Result Value Ref Range    Color YELLOW      Appearance CLEAR      Specific gravity 1.023 1.001 - 1.023      pH (UA) 8.0 5.0 - 9.0      Protein 100 (A) NEG mg/dL    Glucose 100 mg/dL    Ketone TRACE (A) NEG mg/dL    Bilirubin Negative NEG      Blood Negative NEG      Urobilinogen 1.0 0.2 - 1.0 EU/dL    Nitrites Negative NEG      Leukocyte Esterase Negative NEG      WBC 0-3 0 /hpf    RBC 0-3 0 /hpf    Epithelial cells 3-5 0 /hpf    Bacteria 0 0 /hpf    Casts 3-5 0 /lpf   LACTIC ACID    Collection Time: 12/19/20  9:49 PM   Result Value Ref Range    Lactic acid 1.0 0.4 - 2.0 MMOL/L       Imaging /Procedures /Studies:  Ct Abd Pelv W Cont    Result Date: 12/19/2020  IMPRESSION: Bibasilar atelectasis or pneumonia. Date of Dictation: 12/19/2020 11:09 PM     Xr Chest Port    Result Date: 12/19/2020  IMPRESSION: Mild bibasilar atelectasis or pneumonia. Assessment and Plan: Active Problems:    Acute respiratory failure with hypoxia (HCC) (12/19/2020)    Wean O2 as tolerated. COVID    IV Rocephin, Azithromycin, Decadron, Remdesivir, VIt C, Zinc. Follow EKG, coags, electrolytes      DVT Prophylaxis: Lovenox      Code Status: FULL CODE      Disposition: Admit to [facility] for evaluation and treatment as per above.       Anticipated discharge: 2-3 days     Signed By: Clarence Dean MD     December 20, 2020

## 2020-12-20 NOTE — PROGRESS NOTES
Hospitalist Progress Note    2020  Admit Date: 2020  7:38 PM   NAME: Rabia Clark   :  1980   MRN:  [id number]   Attending: Maureen Ghosh DO  PCP:  Antonio Florez MD    SUBJECTIVE:   Rabia Clark is a 38 y.o. female with a past medical history of DM type II, GERD who presents to the ER with report of nausea, vomiting, abdominal pain for the past 5 days. She admits to worsening fevers and chills as well. She tested positive for COVID 4 days ago. Febrile on admission and tachycardic. Now on [facility] for RR28. She has been started on treatment for COVID-19    Pt stated this morning that she feels better. Complaining of back pain but not too bad. Abdominal pain, N/V have slightly improved. Denies dizziness, chest pain, peripheral edema, diarrhea or constipation    Review of Systems negative with exception of pertinent positives noted above  PHYSICAL EXAM     Visit Vitals  BP 99/61   Pulse 77   Temp 98.6 °F (37 °C)   Resp 18   Ht 5' 6\" (1.676 m)   Wt 84.9 kg (187 lb 3.2 oz)   SpO2 95%   BMI 30.21 kg/m²      Temp (24hrs), Av.9 °F (37.7 °C), Min:98.6 °F (37 °C), Max:102.8 °F (39.3 °C)    Oxygen Therapy  O2 Sat (%): 95 % (20 1707)  Pulse via Oximetry: 72 beats per minute (20 1423)  O2 Device: Nasal cannula (20 1423)  O2 Flow Rate (L/min): 2 l/min (20 1423)    Intake/Output Summary (Last 24 hours) at 2020 1819  Last data filed at 2020 1116  Gross per 24 hour   Intake 220 ml   Output --   Net 220 ml      General: No acute distress   Lungs:  Coarse lung sounds Bilaterally. Heart:  Regular rate and rhythm,  No murmur, rub, or gallop  Abdomen: Soft, Non distended, Non tender, Positive bowel sounds  Extremities: No cyanosis, clubbing or edema  Neurologic:  No focal deficits    CT ABD PELV W CONT   Final Result   IMPRESSION:      Bibasilar atelectasis or pneumonia.       Date of Dictation: 2020 11:09 PM            XR CHEST PORT   Final Result   IMPRESSION: Mild bibasilar atelectasis or pneumonia. ASSESSMENT      Active Hospital Problems    Diagnosis Date Noted    COVID-19 12/20/2020    Viral gastritis 12/20/2020    Acute respiratory failure with hypoxia ([facility] [address] 12/19/2020    Sepsis ([facility] [address] 11/24/2019    Diabetes ([facility] [address] 11/24/2019     Plan:    Acute respiratory failure with hypoxia 2/2 Covid-19 Pneumonia  Procal Neg. D-Dimer neg. COVID-19 POSITIVE on 12/15. XR chest shows mild bibasilar atelectasis or PNA. On 2LNC. Convalescent plasma ordered--pt agreeable  Dexamethasone for 10 days (12/20-12/30)  Remdesivir for 5 days (12/20-12/25)  Zinc/vitamin C daily  Added IS and albuterol prn  Prone as tolerated  SSI while on steroids  Oxygen supplementation. Wean O2 as tolerated  F/u D-Dimer and other covid labs. Monitor renal and hepatic function while on remdesivir. Sepsis  Meets criteria with fever, RR28 and tachycardic on admission. Most likely 2/2 to viral infection per above. No abx for now. F/u BCx    Viral gastritis, improved  Most likely 2/2 to Covid infection per above  Supportive care    DMII: Hold home Sitagliptin. Started on SSI here. BG qAC/HS. hA1c 11.2    DVT Prophylaxis: Lovenox SQ  Dispo: When stable.  PT/OT    Signed By: Harriet Munoz DO     December 20, 2020

## 2020-12-20 NOTE — PROGRESS NOTES
Instructed pt on use of incentive spirometer with interpreting system. Pt did return demonstration. Pt has significant dyspnea with exertion. Pt is also very weak. Remains on 2L nasal cannula. Pt states abdominal pain is a lot better. No N/V or pain at this time. Call light within reach. Safety measures in place. Preparing to give report to oncoming shift.

## 2020-12-20 NOTE — ED PROVIDER NOTES
43-year-old  female presents with abdominal pain and back pain associated with nausea and vomiting. Symptoms have been present for approximately 5 days. She has had subjective fever. She tested positive for Covid 4 days ago. No diarrhea. She has had cough and some shortness of breath. The history is provided by the patient. A  was used.         Past Medical History:   Diagnosis Date    Diabetes ([address]     oral agents only, 11/2019 A1C 8.5,  fbs avg -125----denies hypoglycemic episodes    GERD (gastroesophageal reflux disease)     listed in h/o but patient denies    Hypercholesterolemia     Kidney stone        Past Surgical History:   Procedure Laterality Date    CYSTOSCOPY,INSERT URETERAL STENT Right 11/2019    HX DILATION AND CURETTAGE           Family History:   Problem Relation Age of Onset    Diabetes Mother     Diabetes Father        Social History     Socioeconomic History    Marital status: LEGALLY      Spouse name: Not on file    Number of children: Not on file    Years of education: Not on file    Highest education level: Not on file   Occupational History    Not on file   Social Needs    Financial resource strain: Not on file    Food insecurity     Worry: Not on file     Inability: Not on file    Transportation needs     Medical: Not on file     Non-medical: Not on file   Tobacco Use    Smoking status: Never Smoker    Smokeless tobacco: Never Used   Substance and Sexual Activity    Alcohol use: Not Currently    Drug use: Never    Sexual activity: Not on file   Lifestyle    Physical activity     Days per week: Not on file     Minutes per session: Not on file    Stress: Not on file   Relationships    Social connections     Talks on phone: Not on file     Gets together: Not on file     Attends Gnosticist service: Not on file     Active member of club or organization: Not on file     Attends meetings of clubs or organizations: Not on file Relationship status: Not on file    Intimate partner violence     Fear of current or ex partner: Not on file     Emotionally abused: Not on file     Physically abused: Not on file     Forced sexual activity: Not on file   Other Topics Concern    Not on file   Social History Narrative    Not on file         ALLERGIES: Patient has no known allergies. Review of Systems   Constitutional: Positive for chills and fever. HENT: Negative for congestion. Respiratory: Positive for cough and shortness of breath. Cardiovascular: Negative for chest pain. Gastrointestinal: Positive for abdominal pain, nausea and vomiting. Negative for diarrhea. Genitourinary: Negative for dysuria. Musculoskeletal: Positive for back pain and myalgias. Negative for neck pain. Skin: Negative for rash. Neurological: Negative for headaches. Psychiatric/Behavioral: Negative for confusion. Vitals:    12/19/20 1852 12/19/20 2008   BP: 133/77    Pulse: (!) 121    Resp: 28    Temp: (!) 102.8 °F (39.3 °C)    SpO2: 92% 96%            Physical Exam  Vitals signs and nursing note reviewed. Constitutional:       General: She is not in acute distress. Appearance: Normal appearance. She is not toxic-appearing. HENT:      Head: Normocephalic and atraumatic. Nose: Nose normal.      Mouth/Throat:      Mouth: Mucous membranes are moist.      Pharynx: Oropharynx is clear. Eyes:      Conjunctiva/sclera: Conjunctivae normal.      Pupils: Pupils are equal, round, and reactive to light. Neck:      Musculoskeletal: Normal range of motion and neck supple. Cardiovascular:      Rate and Rhythm: Regular rhythm. Tachycardia present. Heart sounds: No murmur. Pulmonary:      Effort: Pulmonary effort is normal.      Breath sounds: Rhonchi present. Abdominal:      General: There is no distension. Palpations: Abdomen is soft. Tenderness: There is abdominal tenderness in the right lower quadrant.  There is no guarding. Musculoskeletal: Normal range of motion. Skin:     General: Skin is warm and dry. Neurological:      Mental Status: She is alert and oriented to person, place, and time. Psychiatric:         Mood and Affect: Mood normal.         Behavior: Behavior normal.          MDM  Number of Diagnoses or Management Options  Diagnosis management comments: Blood work is unremarkable. Chest x-ray shows bibasilar infiltrate. CT scan was obtained due to patient's abdominal pain on exam.  This shows normal appendix. So shows bilateral lower lobe infiltrate. Patient treated with IV fluids, Toradol, Rocephin and Zithromax. She is feeling a bit better. 2 saturation did dip to 89% on room air and patient was placed on nasal cannula. Specialist consulted for admission.        Amount and/or Complexity of Data Reviewed  Clinical lab tests: ordered and reviewed  Tests in the radiology section of CPT®: ordered and reviewed  Tests in the medicine section of CPT®: ordered and reviewed  Independent visualization of images, tracings, or specimens: yes    Risk of Complications, Morbidity, and/or Mortality  Presenting problems: moderate  Diagnostic procedures: moderate  Management options: moderate           Procedures

## 2020-12-20 NOTE — PROGRESS NOTES
Received report from [facility].

## 2020-12-21 LAB
ABO + RH BLD: NORMAL
ALBUMIN SERPL-MCNC: 2.6 G/DL (ref 3.5–5)
ALBUMIN/GLOB SERPL: 0.6 {RATIO} (ref 1.2–3.5)
ALP SERPL-CCNC: 92 U/L (ref 50–136)
ALT SERPL-CCNC: 55 U/L (ref 12–65)
ANION GAP SERPL CALC-SCNC: 5 MMOL/L (ref 7–16)
AST SERPL-CCNC: 37 U/L (ref 15–37)
ATRIAL RATE: 59 BPM
BASOPHILS # BLD: 0 K/UL (ref 0–0.2)
BASOPHILS NFR BLD: 0 % (ref 0–2)
BILIRUB SERPL-MCNC: 0.3 MG/DL (ref 0.2–1.1)
BLOOD GROUP ANTIBODIES SERPL: NORMAL
BUN SERPL-MCNC: 10 MG/DL (ref 6–23)
CALCIUM SERPL-MCNC: 7.4 MG/DL (ref 8.3–10.4)
CALCULATED P AXIS, ECG09: 45 DEGREES
CALCULATED R AXIS, ECG10: 7 DEGREES
CALCULATED T AXIS, ECG11: 6 DEGREES
CHLORIDE SERPL-SCNC: 108 MMOL/L (ref 98–107)
CO2 SERPL-SCNC: 26 MMOL/L (ref 21–32)
CREAT SERPL-MCNC: 0.49 MG/DL (ref 0.6–1)
DIAGNOSIS, 93000: NORMAL
DIFFERENTIAL METHOD BLD: ABNORMAL
EOSINOPHIL # BLD: 0 K/UL (ref 0–0.8)
EOSINOPHIL NFR BLD: 0 % (ref 0.5–7.8)
ERYTHROCYTE [DISTWIDTH] IN BLOOD BY AUTOMATED COUNT: 12.8 % (ref 11.9–14.6)
GLOBULIN SER CALC-MCNC: 4.7 G/DL (ref 2.3–3.5)
GLUCOSE BLD STRIP.AUTO-MCNC: 204 MG/DL (ref 65–100)
GLUCOSE BLD STRIP.AUTO-MCNC: 305 MG/DL (ref 65–100)
GLUCOSE BLD STRIP.AUTO-MCNC: 319 MG/DL (ref 65–100)
GLUCOSE BLD STRIP.AUTO-MCNC: 411 MG/DL (ref 65–100)
GLUCOSE SERPL-MCNC: 200 MG/DL (ref 65–100)
HCT VFR BLD AUTO: 41.2 % (ref 35.8–46.3)
HGB BLD-MCNC: 13.2 G/DL (ref 11.7–15.4)
IMM GRANULOCYTES # BLD AUTO: 0 K/UL (ref 0–0.5)
IMM GRANULOCYTES NFR BLD AUTO: 0 % (ref 0–5)
LYMPHOCYTES # BLD: 1.6 K/UL (ref 0.5–4.6)
LYMPHOCYTES NFR BLD: 23 % (ref 13–44)
MAGNESIUM SERPL-MCNC: 2.3 MG/DL (ref 1.8–2.4)
MCH RBC QN AUTO: 28.9 PG (ref 26.1–32.9)
MCHC RBC AUTO-ENTMCNC: 32 G/DL (ref 31.4–35)
MCV RBC AUTO: 90.2 FL (ref 79.6–97.8)
MONOCYTES # BLD: 0.5 K/UL (ref 0.1–1.3)
MONOCYTES NFR BLD: 7 % (ref 4–12)
NEUTS SEG # BLD: 4.8 K/UL (ref 1.7–8.2)
NEUTS SEG NFR BLD: 70 % (ref 43–78)
NRBC # BLD: 0 K/UL (ref 0–0.2)
P-R INTERVAL, ECG05: 162 MS
PHOSPHATE SERPL-MCNC: 2.7 MG/DL (ref 2.5–4.5)
PLATELET # BLD AUTO: 242 K/UL (ref 150–450)
PMV BLD AUTO: 9.5 FL (ref 9.4–12.3)
POTASSIUM SERPL-SCNC: 4.4 MMOL/L (ref 3.5–5.1)
PROT SERPL-MCNC: 7.3 G/DL (ref 6.3–8.2)
Q-T INTERVAL, ECG07: 450 MS
QRS DURATION, ECG06: 84 MS
QTC CALCULATION (BEZET), ECG08: 445 MS
RBC # BLD AUTO: 4.57 M/UL (ref 4.05–5.2)
SODIUM SERPL-SCNC: 139 MMOL/L (ref 136–145)
SPECIMEN EXP DATE BLD: NORMAL
VENTRICULAR RATE, ECG03: 59 BPM
WBC # BLD AUTO: 6.8 K/UL (ref 4.3–11.1)

## 2020-12-21 PROCEDURE — 74011000250 HC RX REV CODE- 250: Performed by: FAMILY MEDICINE

## 2020-12-21 PROCEDURE — 65270000029 HC RM PRIVATE

## 2020-12-21 PROCEDURE — 74011636637 HC RX REV CODE- 636/637: Performed by: FAMILY MEDICINE

## 2020-12-21 PROCEDURE — 97110 THERAPEUTIC EXERCISES: CPT

## 2020-12-21 PROCEDURE — 74011250636 HC RX REV CODE- 250/636: Performed by: FAMILY MEDICINE

## 2020-12-21 PROCEDURE — 36415 COLL VENOUS BLD VENIPUNCTURE: CPT

## 2020-12-21 PROCEDURE — 85025 COMPLETE CBC W/AUTO DIFF WBC: CPT

## 2020-12-21 PROCEDURE — 94760 N-INVAS EAR/PLS OXIMETRY 1: CPT

## 2020-12-21 PROCEDURE — 97535 SELF CARE MNGMENT TRAINING: CPT

## 2020-12-21 PROCEDURE — 97161 PT EVAL LOW COMPLEX 20 MIN: CPT

## 2020-12-21 PROCEDURE — 77010033678 HC OXYGEN DAILY

## 2020-12-21 PROCEDURE — 74011250637 HC RX REV CODE- 250/637: Performed by: FAMILY MEDICINE

## 2020-12-21 PROCEDURE — XW13325 TRANSFUSION OF CONVALESCENT PLASMA (NONAUTOLOGOUS) INTO PERIPHERAL VEIN, PERCUTANEOUS APPROACH, NEW TECHNOLOGY GROUP 5: ICD-10-PCS | Performed by: FAMILY MEDICINE

## 2020-12-21 PROCEDURE — 2709999900 HC NON-CHARGEABLE SUPPLY

## 2020-12-21 PROCEDURE — 84100 ASSAY OF PHOSPHORUS: CPT

## 2020-12-21 PROCEDURE — 80053 COMPREHEN METABOLIC PANEL: CPT

## 2020-12-21 PROCEDURE — 93005 ELECTROCARDIOGRAM TRACING: CPT | Performed by: FAMILY MEDICINE

## 2020-12-21 PROCEDURE — 36430 TRANSFUSION BLD/BLD COMPNT: CPT

## 2020-12-21 PROCEDURE — 97165 OT EVAL LOW COMPLEX 30 MIN: CPT

## 2020-12-21 PROCEDURE — 94640 AIRWAY INHALATION TREATMENT: CPT

## 2020-12-21 PROCEDURE — 86900 BLOOD TYPING SEROLOGIC ABO: CPT

## 2020-12-21 PROCEDURE — 74011000258 HC RX REV CODE- 258: Performed by: FAMILY MEDICINE

## 2020-12-21 PROCEDURE — 82962 GLUCOSE BLOOD TEST: CPT

## 2020-12-21 PROCEDURE — 83735 ASSAY OF MAGNESIUM: CPT

## 2020-12-21 RX ORDER — INSULIN LISPRO 100 [IU]/ML
10 INJECTION, SOLUTION INTRAVENOUS; SUBCUTANEOUS
Status: DISCONTINUED | OUTPATIENT
Start: 2020-12-21 | End: 2020-12-22

## 2020-12-21 RX ORDER — SODIUM CHLORIDE 9 MG/ML
250 INJECTION, SOLUTION INTRAVENOUS AS NEEDED
Status: DISCONTINUED | OUTPATIENT
Start: 2020-12-21 | End: 2020-12-23 | Stop reason: HOSPADM

## 2020-12-21 RX ORDER — INSULIN GLARGINE 100 [IU]/ML
20 INJECTION, SOLUTION SUBCUTANEOUS DAILY
Status: DISCONTINUED | OUTPATIENT
Start: 2020-12-22 | End: 2020-12-22

## 2020-12-21 RX ORDER — INSULIN GLARGINE 100 [IU]/ML
10 INJECTION, SOLUTION SUBCUTANEOUS DAILY
Status: DISCONTINUED | OUTPATIENT
Start: 2020-12-21 | End: 2020-12-21

## 2020-12-21 RX ORDER — INSULIN LISPRO 100 [IU]/ML
3 INJECTION, SOLUTION INTRAVENOUS; SUBCUTANEOUS
Status: DISCONTINUED | OUTPATIENT
Start: 2020-12-21 | End: 2020-12-21

## 2020-12-21 RX ADMIN — Medication 10 ML: at 20:11

## 2020-12-21 RX ADMIN — Medication 10 ML: at 12:26

## 2020-12-21 RX ADMIN — OXYCODONE HYDROCHLORIDE AND ACETAMINOPHEN 1000 MG: 500 TABLET ORAL at 08:35

## 2020-12-21 RX ADMIN — ALBUTEROL SULFATE 2 PUFF: 108 INHALANT RESPIRATORY (INHALATION) at 14:40

## 2020-12-21 RX ADMIN — REMDESIVIR 100 MG: 100 INJECTION, POWDER, LYOPHILIZED, FOR SOLUTION INTRAVENOUS at 10:21

## 2020-12-21 RX ADMIN — FAMOTIDINE 20 MG: 10 INJECTION INTRAVENOUS at 20:10

## 2020-12-21 RX ADMIN — DEXAMETHASONE SODIUM PHOSPHATE 6 MG: 10 INJECTION INTRAMUSCULAR; INTRAVENOUS at 08:35

## 2020-12-21 RX ADMIN — FAMOTIDINE 20 MG: 10 INJECTION INTRAVENOUS at 08:35

## 2020-12-21 RX ADMIN — INSULIN LISPRO 10 UNITS: 100 INJECTION, SOLUTION INTRAVENOUS; SUBCUTANEOUS at 16:08

## 2020-12-21 RX ADMIN — INSULIN LISPRO 8 UNITS: 100 INJECTION, SOLUTION INTRAVENOUS; SUBCUTANEOUS at 20:23

## 2020-12-21 RX ADMIN — MAGNESIUM HYDROXIDE 30 ML: 2400 SUSPENSION ORAL at 16:05

## 2020-12-21 RX ADMIN — ALBUTEROL SULFATE 2 PUFF: 108 INHALANT RESPIRATORY (INHALATION) at 02:10

## 2020-12-21 RX ADMIN — INSULIN LISPRO 4 UNITS: 100 INJECTION, SOLUTION INTRAVENOUS; SUBCUTANEOUS at 08:35

## 2020-12-21 RX ADMIN — ENOXAPARIN SODIUM 30 MG: 30 INJECTION SUBCUTANEOUS at 20:10

## 2020-12-21 RX ADMIN — ACETAMINOPHEN 650 MG: 325 TABLET, FILM COATED ORAL at 08:36

## 2020-12-21 RX ADMIN — SODIUM CHLORIDE 75 ML/HR: 900 INJECTION, SOLUTION INTRAVENOUS at 23:17

## 2020-12-21 RX ADMIN — INSULIN LISPRO 3 UNITS: 100 INJECTION, SOLUTION INTRAVENOUS; SUBCUTANEOUS at 12:22

## 2020-12-21 RX ADMIN — ALBUTEROL SULFATE 2 PUFF: 108 INHALANT RESPIRATORY (INHALATION) at 08:18

## 2020-12-21 RX ADMIN — ALBUTEROL SULFATE 2 PUFF: 108 INHALANT RESPIRATORY (INHALATION) at 21:36

## 2020-12-21 RX ADMIN — Medication 220 MG: at 08:36

## 2020-12-21 RX ADMIN — OXYCODONE HYDROCHLORIDE AND ACETAMINOPHEN 1000 MG: 500 TABLET ORAL at 16:26

## 2020-12-21 RX ADMIN — INSULIN GLARGINE 10 UNITS: 100 INJECTION, SOLUTION SUBCUTANEOUS at 10:00

## 2020-12-21 RX ADMIN — SODIUM CHLORIDE 75 ML/HR: 900 INJECTION, SOLUTION INTRAVENOUS at 10:21

## 2020-12-21 RX ADMIN — INSULIN LISPRO 8 UNITS: 100 INJECTION, SOLUTION INTRAVENOUS; SUBCUTANEOUS at 12:23

## 2020-12-21 RX ADMIN — ACETAMINOPHEN 650 MG: 325 TABLET, FILM COATED ORAL at 01:45

## 2020-12-21 NOTE — PROGRESS NOTES
Reviewed notes for spiritual concerns  Did not visit due to precautions  Will continue to assess needs thru this admission     used for patient per notes

## 2020-12-21 NOTE — PROGRESS NOTES
Care Management Interventions  Mode of Transport at Discharge: Self  Transition of Care Consult (CM Consult): Discharge Planning  Discharge Durable Medical Equipment: No  Physical Therapy Consult: Yes  Occupational Therapy Consult: Yes  Speech Therapy Consult: No  Confirm Follow Up Transport: Self   Resource Information Provided?: No  Discharge Location  Discharge Placement: Home    Chart screened by  for discharge planning. Patient covid positive. CM unable to enter room and call patient as patient will need a . Patient is medicaid pending and will be unable to discharge to [facility] in the event that is needed. Patient may be able to have [facility] if needed and if patient is agreeable. CM will continue to follow patient during hospitalization for discharge planning and needs. No needs identified at this time. Please consult  if any new issues arise.

## 2020-12-21 NOTE — PROGRESS NOTES
12/21/20 0818   Oxygen Therapy   O2 Sat (%) (!) 88 %   Pulse via Oximetry 70 beats per minute   O2 Device Room air   O2 Flow Rate (L/min) 0 l/min  (placed pt back on 2L NC)

## 2020-12-21 NOTE — PROGRESS NOTES
assisted with primary RN's questions for patient and instructed on needed sputum sample. Available as needed this shift to aid communication.      Lavinia Rowland RN, [facility]/ Registered Nurse 6th floor,   c: [email or web address]

## 2020-12-21 NOTE — PROGRESS NOTES
present for primary RN's explanation of blood consent form and education on convalescent plasma.        Spike Richards RN, [facility]/ Registered Nurse 6th floor,   c: [email or web address]

## 2020-12-21 NOTE — PROGRESS NOTES
ACUTE OT GOALS:  (Developed with and agreed upon by patient and/or caregiver.)  1. Pt will toilet with SBA   2. Pt will complete functional mobility for ADLs with SBA  3. Pt will complete lower body dressing with SBA using AE as needed  4. Pt will complete grooming and hygiene at sink with SBA  5. Pt will demonstrate independence with HEP to promote increased BUE strength and functional use for ADLs  6. Pt will tolerate 23 minutes functional activity with min or fewer rest breaks to promote increased endurance for ADLs  7. Pt will independently demonstrate/ verbalize 2+ energy conservation techniques to promote increased endurance for ADLs  8.  Pt will complete bed mobility with SBA in prep for ADLs    Timeframe: 7 days      OCCUPATIONAL THERAPY ASSESSMENT: Initial Assessment and Daily Note OT Treatment Day # 1    Justine Nickerson is a 38 y.o. female   PRIMARY DIAGNOSIS: Acute respiratory failure with hypoxia ([facility] [address]  Acute respiratory failure with hypoxia ([facility]) [J96.01]       Reason for Referral:  Generalize weakness d/t COVID  ICD-10: Treatment Diagnosis: Generalized Muscle Weakness (M62.81)  INPATIENT: Payor: MEDICAID PENDING / Plan: Shahid Dillard PENDING / Product Type: Medicaid /   ASSESSMENT:     REHAB RECOMMENDATIONS:   Recommendation to date pending progress:  [address] [place] [address]  Equipment:    3 in 1 Bedside Commode     PRIOR LEVEL OF FUNCTION:  (Prior to Hospitalization)  INITIAL/CURRENT LEVEL OF FUNCTION:  (Most Recently Demonstrated)   Bathing:   Independent  Dressing:   Independent  Feeding/Grooming:   Independent  Toileting:   Independent  Functional Mobility:   Independent Bathing:   Minimal Assistance  Dressing:   Minimal Assistance  Feeding/Grooming:   Contact Guard Assistance  Toileting:   Contact Guard Assistance  Functional Mobility:   Minimal Assistance     ASSESSMENT:  Ms. Benjamin Beckford presented generally weak with deficits in endurance, mobility, and balance impacting ADLs. Pt lives with her son and is fully independent at baseline, works, does not own or use any DME. Pt donned socks with CGA, is able to complete grooming/ hygiene with set up. Min A and HHA for transfers d/t weakness, reports that knees feel weak, unsteady. Pt limited by decreased endurance, fatigued quickly with min exertion. Pt is below her functional baseline and would benefit from skilled OT services to address deficits. SUBJECTIVE:   Ms. Benson Distance states, \"I feel weak. \"    SOCIAL HISTORY/LIVING ENVIRONMENT:   Home Environment: Private residence  # Steps to Enter: 4  One/Two Story Residence: One story  Living Alone: No  Support Systems: Child(syed)    OBJECTIVE:     PAIN: VITAL SIGNS: LINES/DRAINS:   Pre Treatment: Pain Screen  Pain Scale 1: Numeric (0 - 10)  Pain Intensity 1: 0  Post Treatment: 0   IV  O2 Device: Nasal cannula     GROSS EVALUATION:  BUE Within Functional Limits Abnormal/ Functional Abnormal/ Non-Functional (see comments) Not Tested Comments:   AROM [x] [] [] []    PROM [] [] [] []    Strength [] [x] [] []    Balance [] [] [x] [] Needs min A and UE support for dynamic standing balance   Posture [] [] [] []    Sensation [] [] [] [x]    Coordination [x] [] [] []    Tone [] [] [] []    Edema [] [] [] []    Activity Tolerance [] [] [x] []     [] [] [] []      COGNITION/  PERCEPTION: Intact Impaired   (see comments) Comments:   Orientation [x] []    Vision [x] []    Hearing [x] []    Judgment/ Insight [x] []    Attention [x] []    Memory [x] []    Command Following [x] []    Emotional Regulation [x] []     [] []      ACTIVITIES OF DAILY LIVING: I Mod I S SBA CGA Min Mod Max Total NT Comments   BASIC ADLs:              Bathing/ Showering [] [] [] [] [] [] [] [] [] []    Toileting [] [] [] [] [] [] [] [] [] []    Dressing [] [] [] [] [x] [] [] [] [] []    Feeding [] [] [] [] [] [] [] [] [] []    Grooming [] [] [] [x] [] [] [] [] [] []    Personal Device Care [] [] [] [] [] [] [] [] [] []    Functional Mobility [] [] [] [] [] [x] [] [] [] []        MOBILITY: I Mod I S SBA CGA Min Mod Max Total  NT x2 Comments:   Supine to sit [] [] [] [x] [] [] [] [] [] [] []    Sit to supine [] [] [] [] [] [] [] [] [] [] []    Sit to stand [] [] [] [] [] [x] [] [] [] [] []    Bed to chair [] [] [] [] [] [x] [] [] [] [] []    I=Independent, Mod I=Modified Independent, S=Supervision, SBA=Standby Assistance, CGA=Contact Guard Assistance,   Min=Minimal Assistance, Mod=Moderate Assistance, Max=Maximal Assistance, Total=Total Assistance, NT=Not Tested    [facility] AM-PAC 6 Clicks   Daily Activity Inpatient Short Form        How much help from another person does the patient currently need. .. Total A Lot A Little None   1. Putting on and taking off regular lower body clothing? [] 1   [] 2   [x] 3   [] 4   2. Bathing (including washing, rinsing, drying)? [] 1   [] 2   [x] 3   [] 4   3. Toileting, which includes using toilet, bedpan or urinal?   [] 1   [] 2   [x] 3   [] 4   4. Putting on and taking off regular upper body clothing? [] 1   [] 2   [x] 3   [] 4   5. Taking care of personal grooming such as brushing teeth? [] 1   [] 2   [x] 3   [] 4   6. Eating meals? [] 1   [] 2   [] 3   [x] 4   © 2007, Trustees of [facility], under license to [employer]. All rights reserved     Score:  Initial: 19 Most Recent: X (Date: -- )   Interpretation of Tool:  Represents activities that are increasingly more difficult (i.e. Bed mobility, Transfers, Gait). PLAN:   FREQUENCY/DURATION: OT Plan of Care: 3 times/week for duration of hospital stay or until stated goals are met, whichever comes first.    PROBLEM LIST:   (Skilled intervention is medically necessary to address:)  1. Decreased ADL/Functional Activities  2. Decreased Activity Tolerance  3. Decreased Balance  4. Decreased Strength  5.  Decreased Transfer Abilities   INTERVENTIONS PLANNED:   (Benefits and precautions of occupational therapy have been discussed with the patient.)  1. Self Care Training  2. Therapeutic Activity  3. Therapeutic Exercise/HEP  4. Neuromuscular Re-education  5. Education     TREATMENT:     EVALUATION: Moderate Complexity : (Untimed Charge)    TREATMENT:   ($$ Self Care/Home Management: 8-22 mins    )  Self Care (8 Minutes): Self care including Lower Body Dressing and Grooming to increase independence and decrease level of assistance required.     AFTER TREATMENT POSITION/PRECAUTIONS:  Chair, Needs within reach and RN notified    INTERDISCIPLINARY COLLABORATION:  RN/PCT and PT/PTA    TOTAL TREATMENT DURATION:  OT Patient Time In/Time Out  Time In: 0920  Time Out: [address] [facility] TIMMY Riley

## 2020-12-21 NOTE — DIABETES MGMT
Patient spoke to from remotely within hospital system for assessment regarding diabetes management, patient positive for COVID-19. Interpretering services utilized via telephone as patient speaks [place] Patient alert and oriented and correctly verbalized patient identifiers states they were diagnosed 4 years ago with diabetes and voices a positive family history of diabetes. Patient states they do not have a working glucometer with supplies at home stating [facility] 1 week ago. Per patient they typically check blood glucose levels 2 times a day. Per patient their blood glucose levels have been running 130s in the morning and 200s before eating dinner 260s after dinner at home. Patient states they are currently taking Metformin combined with another medication (unsure of name) at home for management of diabetes. Patient has not attended formal diabetes education in the past. Noted patient is self pay. Patient would like referral placed for outpatient education through [facility] outreach program. Referral sent. Educated patient regarding relationship between hyperglycemia and infection. Patient receiving steroid therapy. Explained relationship between steroids and hyperglycemia to patient and educated patient that as steroids are tapered their glycemic control should improve and insulin needs should decrease as well. Discussed significance of HbA1c. Patient interested in learning how to use insulin. Patient also voices constipation, primary RN updated. Primary RN given educational folder for patient regarding diabetes all handouts and information provided in [place]. Patient states she has no difficulties reading Vietnamese materials. Instructed patient to read over materials and watch nurses when they give patient insulin injection. Plan to follow up with patient tomorrow for further diabetes education as patient condition allows.  Patient verbalized understanding and voices no further questions at this time. Update: most recent FSBS 411. Provider updated. Spoke with provider new orders received to increase prandial insulin to Humalog 10 units with meals and increase Lantus to 20 units daily.

## 2020-12-21 NOTE — ACP (ADVANCE CARE PLANNING)
CM unable to perform ACP with patient as patient requires an  and CM is unable to enter covid rooms at this time. CM will attempt once patient is off precautions.

## 2020-12-21 NOTE — PROGRESS NOTES
With the help of my coworker, Yesica Krishnan,  a [address] [facility] speaking nurse, helped me to explain and give instructions to pt on how to self inject insulin. Pt participated well with doing her own self insulin injection.

## 2020-12-21 NOTE — PROGRESS NOTES
Hospitalist Progress Note    2020  Admit Date: 2020  7:38 PM   NAME: Rabia Clark   :  1980   MRN:  [id number]   Attending: Maureen Ghosh DO  PCP:  Antonio Florez MD    SUBJECTIVE:   Rabia Clark is a 38 y.o. female with a past medical history of DM type II, GERD who presents to the ER with report of nausea, vomiting, abdominal pain for the past 5 days. She admits to worsening fevers and chills as well. She tested positive for COVID 4 days ago. Febrile on admission and tachycardic. Now on [facility] for RR28. She has been started on treatment for COVID-19    Still SOB with exertion. On 2LNC. Complaining of back pain but not too bad with medication. Abdominal pain, N/V have improved. Denies dizziness, chest pain, peripheral edema, diarrhea or constipation    Review of Systems negative with exception of pertinent positives noted above  PHYSICAL EXAM     Visit Vitals  /71 (BP 1 Location: Right arm, BP Patient Position: At rest)   Pulse 63   Temp 98.5 °F (36.9 °C)   Resp 18   Ht 5' 6\" (1.676 m)   Wt 84.9 kg (187 lb 3.2 oz)   SpO2 95%   BMI 30.21 kg/m²      Temp (24hrs), Av.4 °F (36.9 °C), Min:97.9 °F (36.6 °C), Max:98.9 °F (37.2 °C)    Oxygen Therapy  O2 Sat (%): 95 % (20 1517)  Pulse via Oximetry: 65 beats per minute (20 1440)  O2 Device: Nasal cannula (20 1440)  O2 Flow Rate (L/min): 2 l/min (20 1440)    Intake/Output Summary (Last 24 hours) at 2020 1529  Last data filed at 2020 1343  Gross per 24 hour   Intake 480 ml   Output --   Net 480 ml      General: No acute distress   Lungs:  Coarse lung sounds Bilaterally. Heart:  Regular rate and rhythm,  No murmur, rub, or gallop  Abdomen: Soft, Non distended, Non tender, Positive bowel sounds  Extremities: No cyanosis, clubbing or edema  Neurologic:  No focal deficits    CT ABD PELV W CONT   Final Result   IMPRESSION:      Bibasilar atelectasis or pneumonia.       Date of Dictation: 12/19/2020 11:09 PM            XR CHEST PORT   Final Result   IMPRESSION: Mild bibasilar atelectasis or pneumonia. ASSESSMENT      Active Hospital Problems    Diagnosis Date Noted    COVID-19 12/20/2020    Viral gastritis 12/20/2020    Acute respiratory failure with hypoxia ([facility] [address] 12/19/2020    Sepsis ([facility] [address] 11/24/2019    Diabetes ([facility] [address] 11/24/2019     Plan:    Acute respiratory failure with hypoxia 2/2 Covid-19 Pneumonia  Procal Neg. D-Dimer neg. COVID-19 POSITIVE on 12/15. XR chest shows mild bibasilar atelectasis or PNA. On 2LNC. Convalescent plasma ordered--pt agreeable  Dexamethasone for 10 days (12/20-12/30)  Remdesivir for 5 days (12/20-12/25)  Zinc/vitamin C daily  Added IS and albuterol prn  Prone as tolerated  SSI while on steroids  Oxygen supplementation. 2LNC. Wean O2 as tolerated. F/u D-Dimer and other covid labs. Monitor renal and hepatic function while on remdesivir. Procal negative. Stop abx    Sepsis  Meets criteria with fever, RR28 and tachycardic on admission. Most likely 2/2 to viral infection per above. No abx for now. F/u BCx    Viral gastritis, improved  Most likely 2/2 to Covid infection per above  Supportive care    DMII: Hold home Sitagliptin. Started on SSI here. BG qAC/HS. hA1c 11.2    DVT Prophylaxis: Lovenox SQ  Dispo: When stable.  PT/OT    Signed By: Chaz Pearce DO     December 21, 2020

## 2020-12-21 NOTE — PROGRESS NOTES
present for primary RN''s instructing patient about self-injecting insulin.          Spike Richards RN, [facility]/ Registered Nurse 6th floor,   Patient Relations & Interpreting Services  c: [email or web address]

## 2020-12-21 NOTE — PROGRESS NOTES
ACUTE PHYSICAL THERAPY GOALS:  (Developed with and agreed upon by patient and/or caregiver. )  LTG:  (1.)Ms. Sascha Stephenson will move from supine to sit and sit to supine, scoot up and down and roll side to side in bed INDEPENDENTLY with bed flat within 7 treatment day(s). (2.)Ms. Sascha Stephenson will transfer from bed to chair and chair to bed INDEPENDENTLY within 7 treatment day(s). (3.)Ms. Sascha Stephenson will ambulate INDEPENDENTLY for 150+ feet with the least restrictive device within 7 treatment day(s). ________________________________________________________________________________________________    PHYSICAL THERAPY ASSESSMENT: Initial Assessment and AM PT Treatment Day # 1      Steph Rede is a 38 y.o. female   PRIMARY DIAGNOSIS: Acute respiratory failure with hypoxia ([address]  Acute respiratory failure with hypoxia ([facility] [address] [J96.01]       Reason for Referral: weakness, respiratory failure, COVID 19   ICD-10: Treatment Diagnosis: Generalized Muscle Weakness (M62.81)  Other abnormalities of gait and mobility (R26.89)  INPATIENT: Payor: MEDICAID PENDING / Plan: [facility] MEDICAID PENDING / Product Type: Medicaid /     ASSESSMENT:     REHAB RECOMMENDATIONS:   Recommendation to date pending progress:  [address] [place] [address]  Equipment:    None     PRIOR LEVEL OF FUNCTION:  (Prior to Hospitalization) INITIAL/CURRENT LEVEL OF FUNCTION:  (Most Recently Demonstrated)   Bed Mobility:   Independent  Sit to Stand:   Independent  Transfers:   Independent  Gait/Mobility:   Independent Bed Mobility:   Supervision  Sit to Stand:  [facility] [employer] Assistance  Transfers:   Standby Assistance  Gait/Mobility:   Minimal Assistance     ASSESSMENT:  Ms. Sascha Stephenson is admitted from home with complications due to COVID 19 virus infection. She lives with son and is fully independent at baseline, works. Presents on 2L O2 via NC and is agreeable to therapy.  Performs bed mobility with supervision, sit-stand transfer with CGA. Ambulatory 5 ft x2 with min handheld assist and significant weakness, unsteady gait. Needs seated rest break between trials. Pt unable to continue with activity due to weakness, fatigue. Up to chair and discussed benefit of spending day out of bed. Will need continued therapy during hospital stay to maximize safety/independence with mobility. Hopefully home once stronger and breathing better. SUBJECTIVE:   Ms. Shane Truong states, \"I'm weak. \"    SOCIAL HISTORY/LIVING ENVIRONMENT: Lives with son.  Independent, works, no DME or home O2  Home Environment: Private residence  # Steps to Enter: 4  One/Two Story Residence: One story  Living Alone: No  Support Systems: Child(syed)  OBJECTIVE:     PAIN: VITAL SIGNS: LINES/DRAINS:   Pre Treatment: Pain Screen  Pain Scale 1: Numeric (0 - 10)  Pain Intensity 1: 0  Post Treatment: 0/10 Vital Signs  O2 Device: Nasal cannula  O2 Flow Rate (L/min): 2 l/min IV  O2 Device: Nasal cannula     GROSS EVALUATION:   Within Functional Limits Abnormal/ Functional Abnormal/ Non-Functional (see comments) Not Tested Comments:   AROM [x] [] [] []    PROM [] [] [] []    Strength [] [x] [] []    Balance [] [x] [] []    Posture [] [] [] []    Sensation [] [] [] []    Coordination [] [x] [] []    Tone [] [] [] []    Edema [] [] [] []    Activity Tolerance [] [x] [] []     [] [] [] []      COGNITION/  PERCEPTION: Intact Impaired   (see comments) Comments:   Orientation [] []    Vision [x] []    Hearing [x] []    Command Following [x] []    Safety Awareness [x] []     [] []      MOBILITY: I Mod I S SBA CGA Min Mod Max Total  NT x2 Comments:   Bed Mobility    Rolling [] [] [x] [] [] [] [] [] [] [] []    Supine to Sit [] [] [x] [] [] [] [] [] [] [] []    Scooting [] [] [x] [] [] [] [] [] [] [] []    Sit to Supine [] [] [] [] [] [] [] [] [] [] []    Transfers    Sit to Stand [] [] [] [] [x] [] [] [] [] [] []    Bed to Chair [] [] [] [] [] [x] [] [] [] [] []    Stand to Sit [] [] [] [] [x] [] [] [] [] [] []    I=Independent, Mod I=Modified Independent, S=Supervision, SBA=Standby Assistance, CGA=Contact Guard Assistance,   Min=Minimal Assistance, Mod=Moderate Assistance, Max=Maximal Assistance, Total=Total Assistance, NT=Not Tested  GAIT: I Mod I S SBA CGA Min Mod Max Total  NT x2 Comments:   Level of Assistance [] [] [] [] [] [x] [] [] [] [] []    Distance 5 ft x2    DME min handheld assist    Gait Quality Slow, delayed, trunk sway    I=Independent, Mod I=Modified Independent, S=Supervision, SBA=Standby Assistance, CGA=Contact Guard Assistance,   Min=Minimal Assistance, Mod=Moderate Assistance, Max=Maximal Assistance, Total=Total Assistance, NT=Not [facility]       How much difficulty does the patient currently have. .. Unable A Lot A Little None   1. Turning over in bed (including adjusting bedclothes, sheets and blankets)? [] 1   [] 2   [] 3   [x] 4   2. Sitting down on and standing up from a chair with arms ( e.g., wheelchair, bedside commode, etc.)   [] 1   [] 2   [x] 3   [] 4   3. Moving from lying on back to sitting on the side of the bed? [] 1   [] 2   [] 3   [x] 4   How much help from another person does the patient currently need. .. Total A Lot A Little None   4. Moving to and from a bed to a chair (including a wheelchair)? [] 1   [] 2   [x] 3   [] 4   5. Need to walk in hospital room? [] 1   [] 2   [x] 3   [] 4   6. Climbing 3-5 steps with a railing? [] 1   [x] 2   [] 3   [] 4   © 2007, Trustees of [facility] [employer], under license to [employer]. All rights reserved     Score:  Initial: 19 Most Recent: X (Date: -- )    Interpretation of Tool:  Represents activities that are increasingly more difficult (i.e. Bed mobility, Transfers, Gait).     PLAN:   FREQUENCY/DURATION: PT Plan of Care: 3 times/week for duration of hospital stay or until stated goals are met, whichever comes first.    PROBLEM LIST:   (Skilled intervention is medically necessary to address:)  1. Decreased Activity Tolerance  2. Decreased Balance  3. Decreased Coordination  4. Decreased Gait Ability  5. Decreased Strength  6. Decreased Transfer Abilities   INTERVENTIONS PLANNED:   (Benefits and precautions of physical therapy have been discussed with the patient.)  1. Therapeutic Activity  2. Therapeutic Exercise/HEP  3. Neuromuscular Re-education  4. Gait Training  5. Manual Therapy  6. Education     TREATMENT:     EVALUATION: Low Complexity : (Untimed Charge)    TREATMENT:   ($$ Therapeutic Exercises: 8-22 mins    )  Therapeutic Exercise (8 Minutes): Therapeutic exercises noted below to improve functional activity tolerance, strength, mobility and balance.        Date:  12/21/20 Date:   Date:     Activity/Exercise Parameters Parameters Parameters   ambulation 5 ft x2 min HHA     Seated activities/balance 4 mins                                         AFTER TREATMENT POSITION/PRECAUTIONS:  Chair, Needs within reach and RN notified    INTERDISCIPLINARY COLLABORATION:  RN/PCT, PT/PTA and OT/RUBIO    TOTAL TREATMENT DURATION:  PT Patient Time In/Time Out  Time In: 0920  Time Out: Cole [address], DPT

## 2020-12-21 NOTE — DIABETES MGMT
Patient admitted with acute respiratory failure with hypoxia, positive for COVID-19. Admitting blood glucose 203. HbA1c 11.2. Blood glucose ranged 161-359 yesterday with patient receiving Humalog 22 units and Decadron 6mg. Blood glucose this morning was 204. Reviewed patient current regimen: Humalog SSI and Decadron 6mg daily. Updated provider via [employer] regarding patient glycemic control. Spoke with provider. New orders received to initiate Lantus 10 unit daily as fasting blood glucose is not at goal and to initiate prandial insulin Humalog 3 units with meals to help offset hyperglycemia related to caloric intake and steroid therapy.

## 2020-12-22 LAB
ALBUMIN SERPL-MCNC: 2.6 G/DL (ref 3.5–5)
ALBUMIN/GLOB SERPL: 0.6 {RATIO} (ref 1.2–3.5)
ALP SERPL-CCNC: 98 U/L (ref 50–136)
ALT SERPL-CCNC: 50 U/L (ref 12–65)
ANION GAP SERPL CALC-SCNC: 7 MMOL/L (ref 7–16)
AST SERPL-CCNC: 23 U/L (ref 15–37)
BASOPHILS # BLD: 0 K/UL (ref 0–0.2)
BASOPHILS NFR BLD: 0 % (ref 0–2)
BILIRUB SERPL-MCNC: 0.3 MG/DL (ref 0.2–1.1)
BLD PROD TYP BPU: NORMAL
BLOOD BANK CMNT PATIENT-IMP: NORMAL
BPU ID: NORMAL
BUN SERPL-MCNC: 11 MG/DL (ref 6–23)
CALCIUM SERPL-MCNC: 7.7 MG/DL (ref 8.3–10.4)
CHLORIDE SERPL-SCNC: 108 MMOL/L (ref 98–107)
CO2 SERPL-SCNC: 26 MMOL/L (ref 21–32)
CREAT SERPL-MCNC: 0.46 MG/DL (ref 0.6–1)
DIFFERENTIAL METHOD BLD: ABNORMAL
EOSINOPHIL # BLD: 0 K/UL (ref 0–0.8)
EOSINOPHIL NFR BLD: 0 % (ref 0.5–7.8)
ERYTHROCYTE [DISTWIDTH] IN BLOOD BY AUTOMATED COUNT: 12.9 % (ref 11.9–14.6)
GLOBULIN SER CALC-MCNC: 4.2 G/DL (ref 2.3–3.5)
GLUCOSE BLD STRIP.AUTO-MCNC: 247 MG/DL (ref 65–100)
GLUCOSE BLD STRIP.AUTO-MCNC: 285 MG/DL (ref 65–100)
GLUCOSE BLD STRIP.AUTO-MCNC: 290 MG/DL (ref 65–100)
GLUCOSE BLD STRIP.AUTO-MCNC: 344 MG/DL (ref 65–100)
GLUCOSE BLD STRIP.AUTO-MCNC: 344 MG/DL (ref 65–100)
GLUCOSE BLD STRIP.AUTO-MCNC: 355 MG/DL (ref 65–100)
GLUCOSE SERPL-MCNC: 237 MG/DL (ref 65–100)
HCT VFR BLD AUTO: 38 % (ref 35.8–46.3)
HGB BLD-MCNC: 12.5 G/DL (ref 11.7–15.4)
IMM GRANULOCYTES # BLD AUTO: 0 K/UL (ref 0–0.5)
IMM GRANULOCYTES NFR BLD AUTO: 0 % (ref 0–5)
LYMPHOCYTES # BLD: 1.5 K/UL (ref 0.5–4.6)
LYMPHOCYTES NFR BLD: 15 % (ref 13–44)
MAGNESIUM SERPL-MCNC: 2.4 MG/DL (ref 1.8–2.4)
MCH RBC QN AUTO: 29.5 PG (ref 26.1–32.9)
MCHC RBC AUTO-ENTMCNC: 32.9 G/DL (ref 31.4–35)
MCV RBC AUTO: 89.6 FL (ref 79.6–97.8)
MONOCYTES # BLD: 0.5 K/UL (ref 0.1–1.3)
MONOCYTES NFR BLD: 5 % (ref 4–12)
NEUTS SEG # BLD: 7.8 K/UL (ref 1.7–8.2)
NEUTS SEG NFR BLD: 79 % (ref 43–78)
NRBC # BLD: 0 K/UL (ref 0–0.2)
PHOSPHATE SERPL-MCNC: 2.5 MG/DL (ref 2.5–4.5)
PLATELET # BLD AUTO: 285 K/UL (ref 150–450)
PMV BLD AUTO: 9.8 FL (ref 9.4–12.3)
POTASSIUM SERPL-SCNC: 3.7 MMOL/L (ref 3.5–5.1)
PROT SERPL-MCNC: 6.8 G/DL (ref 6.3–8.2)
RBC # BLD AUTO: 4.24 M/UL (ref 4.05–5.2)
SODIUM SERPL-SCNC: 141 MMOL/L (ref 136–145)
STATUS OF UNIT,%ST: NORMAL
UNIT DIVISION, %UDIV: NORMAL
WBC # BLD AUTO: 9.8 K/UL (ref 4.3–11.1)

## 2020-12-22 PROCEDURE — 74011250637 HC RX REV CODE- 250/637: Performed by: FAMILY MEDICINE

## 2020-12-22 PROCEDURE — 77010033678 HC OXYGEN DAILY

## 2020-12-22 PROCEDURE — 83735 ASSAY OF MAGNESIUM: CPT

## 2020-12-22 PROCEDURE — 74011250636 HC RX REV CODE- 250/636: Performed by: FAMILY MEDICINE

## 2020-12-22 PROCEDURE — 94640 AIRWAY INHALATION TREATMENT: CPT

## 2020-12-22 PROCEDURE — 74011636637 HC RX REV CODE- 636/637: Performed by: INTERNAL MEDICINE

## 2020-12-22 PROCEDURE — 74011000250 HC RX REV CODE- 250: Performed by: FAMILY MEDICINE

## 2020-12-22 PROCEDURE — 84100 ASSAY OF PHOSPHORUS: CPT

## 2020-12-22 PROCEDURE — 2709999900 HC NON-CHARGEABLE SUPPLY

## 2020-12-22 PROCEDURE — 82962 GLUCOSE BLOOD TEST: CPT

## 2020-12-22 PROCEDURE — 74011636637 HC RX REV CODE- 636/637: Performed by: FAMILY MEDICINE

## 2020-12-22 PROCEDURE — 85025 COMPLETE CBC W/AUTO DIFF WBC: CPT

## 2020-12-22 PROCEDURE — 94760 N-INVAS EAR/PLS OXIMETRY 1: CPT

## 2020-12-22 PROCEDURE — 74011000258 HC RX REV CODE- 258: Performed by: FAMILY MEDICINE

## 2020-12-22 PROCEDURE — 80053 COMPREHEN METABOLIC PANEL: CPT

## 2020-12-22 PROCEDURE — 65270000029 HC RM PRIVATE

## 2020-12-22 RX ORDER — ALBUTEROL SULFATE 90 UG/1
2 AEROSOL, METERED RESPIRATORY (INHALATION)
Status: DISCONTINUED | OUTPATIENT
Start: 2020-12-22 | End: 2020-12-23 | Stop reason: HOSPADM

## 2020-12-22 RX ORDER — INSULIN LISPRO 100 [IU]/ML
14 INJECTION, SOLUTION INTRAVENOUS; SUBCUTANEOUS
Status: DISCONTINUED | OUTPATIENT
Start: 2020-12-22 | End: 2020-12-23 | Stop reason: HOSPADM

## 2020-12-22 RX ORDER — INSULIN GLARGINE 100 [IU]/ML
25 INJECTION, SOLUTION SUBCUTANEOUS DAILY
Status: DISCONTINUED | OUTPATIENT
Start: 2020-12-22 | End: 2020-12-23 | Stop reason: HOSPADM

## 2020-12-22 RX ADMIN — INSULIN LISPRO 4 UNITS: 100 INJECTION, SOLUTION INTRAVENOUS; SUBCUTANEOUS at 07:30

## 2020-12-22 RX ADMIN — ACETAMINOPHEN 650 MG: 325 TABLET, FILM COATED ORAL at 23:13

## 2020-12-22 RX ADMIN — ACETAMINOPHEN 650 MG: 325 TABLET, FILM COATED ORAL at 18:04

## 2020-12-22 RX ADMIN — ALBUTEROL SULFATE 2 PUFF: 108 INHALANT RESPIRATORY (INHALATION) at 01:07

## 2020-12-22 RX ADMIN — REMDESIVIR 100 MG: 100 INJECTION, POWDER, LYOPHILIZED, FOR SOLUTION INTRAVENOUS at 11:00

## 2020-12-22 RX ADMIN — Medication 10 ML: at 05:02

## 2020-12-22 RX ADMIN — FAMOTIDINE 20 MG: 10 INJECTION INTRAVENOUS at 09:25

## 2020-12-22 RX ADMIN — ALBUTEROL SULFATE 2 PUFF: 108 INHALANT RESPIRATORY (INHALATION) at 08:01

## 2020-12-22 RX ADMIN — Medication 220 MG: at 09:25

## 2020-12-22 RX ADMIN — DEXAMETHASONE SODIUM PHOSPHATE 6 MG: 10 INJECTION INTRAMUSCULAR; INTRAVENOUS at 09:25

## 2020-12-22 RX ADMIN — ENOXAPARIN SODIUM 30 MG: 30 INJECTION SUBCUTANEOUS at 20:13

## 2020-12-22 RX ADMIN — INSULIN GLARGINE 25 UNITS: 100 INJECTION, SOLUTION SUBCUTANEOUS at 09:00

## 2020-12-22 RX ADMIN — OXYCODONE HYDROCHLORIDE AND ACETAMINOPHEN 1000 MG: 500 TABLET ORAL at 18:03

## 2020-12-22 RX ADMIN — INSULIN LISPRO 14 UNITS: 100 INJECTION, SOLUTION INTRAVENOUS; SUBCUTANEOUS at 11:30

## 2020-12-22 RX ADMIN — FAMOTIDINE 20 MG: 10 INJECTION INTRAVENOUS at 20:13

## 2020-12-22 RX ADMIN — ENOXAPARIN SODIUM 30 MG: 30 INJECTION SUBCUTANEOUS at 08:00

## 2020-12-22 RX ADMIN — INSULIN LISPRO 8 UNITS: 100 INJECTION, SOLUTION INTRAVENOUS; SUBCUTANEOUS at 20:36

## 2020-12-22 RX ADMIN — OXYCODONE HYDROCHLORIDE AND ACETAMINOPHEN 1000 MG: 500 TABLET ORAL at 09:25

## 2020-12-22 RX ADMIN — INSULIN LISPRO 14 UNITS: 100 INJECTION, SOLUTION INTRAVENOUS; SUBCUTANEOUS at 16:30

## 2020-12-22 RX ADMIN — Medication 10 ML: at 20:15

## 2020-12-22 RX ADMIN — INSULIN LISPRO 8 UNITS: 100 INJECTION, SOLUTION INTRAVENOUS; SUBCUTANEOUS at 16:30

## 2020-12-22 RX ADMIN — Medication 10 ML: at 13:58

## 2020-12-22 RX ADMIN — INSULIN LISPRO 6 UNITS: 100 INJECTION, SOLUTION INTRAVENOUS; SUBCUTANEOUS at 11:30

## 2020-12-22 NOTE — ROUTINE PROCESS
Respiratory Care Services       Policy Number: -[id number]    Title: Patient Care Assessment Protocol    Effective Date: 01/1999    Revised Date: 05/2014, 04/2018, 12/2018, 07/2019    Reviewed Date: 06/2013/ 03/2015, 03/2016, 06/2017        Overview  In an effort to improve quality and reduce costs of respiratory care at [facility], the Respiratory Department has developed a number of Patient Care Protocols. These protocols have been developed according to Carolyn 3 and are utilized for those patients who are ordered respiratory therapy using therapeutic indications and standardized approaches for accomplishing objectives. Patient Care Protocols are intended to improve care by:   Defining the indications and standards of care agreed upon by the Pulmonary Medicine and [address] [place] [address] of [facility].  Training respiratory care practitioners to apply those criteria to individual patients and modify therapy as indicated by the protocols.  Documenting the indication and care plan as part of the initial ordering process.  Tapering or discontinuing treatments once the indication for therapy changes. The Patient Care Protocols shall be universally applied throughout the hospital as determined by   the Pulmonary Medicine and [address] [place] [address]. Rationale for Patient Care Assessment Protocols:   Continuous Quality Improvement   Cost containment   Standardization of care   Enhanced continuity of care   Utilization review   Timely intervention    The following patient care assessment protocols have been developed:   Aerosolized Medication Protocol [email or web address]://[email or web address]/christa/mitch/policies/Respiratory%20Care%20Services%20Policies/-[id number]. doc    Bronchial Hygiene Protocol http://edmarb/[facility]/stfrancis/policies/Respiratory%20Care%20Services%20Policies/-[id number]. doc   Oxygen Protocolhttp://edmarb/localPhotodigmstems/[facility]/stfrancis/policies/Respiratory Care Services Policies/-[id number]. doc http://spb/[facility]/stfrancis/policies/Respiratory%20Care%20Services%20Policies/-[id number]. doc   CVRU Fast Track Weaning Protocol http://spefrainb/localPhotodigmstems/[facility]/stfrancis/policies/Respiratory%20Care%20Services%20Policies/-[id number]. doc    Asthma Treatment Protocol ERhttp://[email or web address]/gv/stfrancis/policies/Respiratory Care Services Policies/-[id number]. doc http://edmaralberto/[facility]/stfrancis/policies/Respiratory%20Care%20Services%20Policies/-[id number]. doc   Pediatric Asthma Treatment Protocol ERhttp://james/[facility]/stfrancis/policies/Respiratory Care Services Policies/-[id number]. doc http://edmaralberto/[facility]/stfrancis/policies/Respiratory%20Care%20Services%20Policies/-[id number]. doc   Alpha-1 Antitrypsin Deficiency Protocolhttp://edmaralberto/[facility]/gvl/stfrancis/policies/Respiratory Care Services Policies/-[id number]. doc http://edmarb/localPhotodigmstems/[facility]/stfrancis/policies/Respiratory%20Care%20Services%20Policies/-[id number]. doc   Prone Positioning Protocol http://edmarb/localPhotodigmstems/[facility]/stfrancis/policies/Respiratory%20Care%20Services%20Policies/-[id number]. doc   COPD Protocol http://[facility]/stancis/policies/Respiratory%20Care%20Services%20Policies/-[id number]. doc   Home Oxygen Assessment Protocolhttp://edmaralberto/[facility]/stfrancis/policies/Respiratory Care Services Policies/-[id number]. doc http://[facility]marSt[facility]/stfrancis/policies/Respiratory%20Care%20Services%20Policies/-[id number]. doc   Ventilator Weaning Protocol http://[facility]St[facility]/stfrancis/policies/Respiratory%20Care%20Services%20Policies/-[id number]. doc   Lung Volume Expansion Protocolhttp://[email or web address]/localsystems/josel/stfrancis/policies/Respiratory Care Services Policies/-[id number]. doc [email or web address]/stfrancis/policies/Respiratory%20Care%20Services%20Policies/-[id number]. docm    The Director of [address] [place] [address] oversees the Patient Care Assessment Program. The Respiratory Educator is responsible for protocol development and training. The Supervisor is responsible for implementation and  activities. Each patient with an order for respiratory treatments will receive an evaluation. Respiratory Care Practitioners (RCP's) will perform the evaluations. The same evaluation tool will be utilized for initial and follow-up assessments. If the patient does not meet criteria for ordered therapy, the therapy will be discontinued. If the patient demonstrates an adverse response to initially ordered therapy, the therapy will be discontinued and the physician will be contacted. Specific physician's orders that deviate from protocols and are deemed \"inappropriate\" or \"unsafe\" will be addressed with ordering physician and/or medical director as required. Respiratory Patient Care Assessment Protocols    I. Policy: In an effort to provide quality patient care and effective utilization of services, physicians who order respiratory therapy will have their patients treated via the protocols established (see attached) Respiratory Care Practitioners (RCP's) will complete the initial assessment which will indicate patient needs,  the care plan developed and will performed within 24 hours of admission. Frequency of the therapy will be set according to the results of the respiratory therapy evaluation and frequency guidelines policy. Reassessment will be continued every 48 hours and more frequently as needed for the individual patient. II. Purpose:     A.  To provide a process that will allow for ongoing assessment and care plan modification for patients receiving respiratory services based on both objective and or subjective patient responses to interventions. This process of protocol utilization will assist in patient care progression while eliminating the need for the physician to continually update respiratory therapy orders. B. To assure continuity of respiratory care that meets [facility] Clinical Practice Guidelines. III. Initiation:  Implement Respiratory Care Protocols for patients who are ordered by physician          to receive respiratory therapy procedures or for ventilator management. IV. Protocol:  A. Upon receiving an order for therapy the RCP will review the patient's EMR (electronic medical record) for all pertinent information includin. Physician's order for therapy  2. Patient history and physical examination  3. Physician progress notes  4. Diagnostic. X-rays, PFT's, arterial blood gases etc.  B. The RCP will perform a respiratory assessment in the following manner:  1. General observations: color, pattern and effort of breathing, chest expansion, (symmetrical and bilateral), level of consciousness and the ability to ambulate. 2. The RCP will assess patient's cough ability and determine if bronchial hygiene is needed. If patient is unable to produce sputum, at that time, the RCP should question the patient with regard to their sputum: production, color consistency, frequency and amount. 3. Auscultation: Using a stethoscope, the RCP will listen and note quality of breath sounds and presence or absence of adventitious breath sounds in all lung fields, both anteriorly and posteriorly. 4. Upon completing the EMR review and physical assessment, the RCP will document findings in the RT Assessment section of the EMR. The score level will be provided and will be used to determine the frequency of therapy. V. Indications:   A. Bronchial Hygiene Protocol indications:   1. Potential for or presence of atelectasis.    1. Need for hydration and removal of retained secretions. 1. Need for improvement of cough effectiveness. 1. Presence of conditions associated with disorder of pulmonary clearance:  a. Cystic fibrosis  b. Bronchiectasis  c. Neuromuscular disease  d. Obstructive lung diseases  e. Restrictive lung diseases   Aerosolized Medication(s) Protocol indications:Treatment of bronchospasm/wheezing  2. Improvement of mucociliary clearance  3. Treatment of stridor  4. History of Bronchiectasis, Asthma or COPD  C. Oxygen Therapy Protocol indications:   1. Documented hypoxemia  2. Severe trauma  3. Acute myocardial infarction  4. Short-term therapy (e.g. post anesthesia recovery)  D. [facility] Ventilator Weaning Protocol indications:  1. All mechanically ventilated surgical patients unless they have a no wean order. E. Asthma Treatment Protocol ER indications:  1. Patients 15years of age and older that have been triaged or diagnosed with   asthma exacerbation shall be indicated for the ER Asthma Treatment Protocol. A physician order will be required to initiate the protocol. F. Pediatric Asthma protocol in the ER indications:  1. Patients less than 15years old that have been triaged or diagnosed with asthma exacerbation shall be indicated for the Pediatric Asthma protocol. A physician order will be required to initiate the protocol. G. Alpha-1 Antitrypsin Deficiency Testing protocol indications:         1. Patients admitted and diagnosed with COPD. H. Prone Positioning Protocol indications:         1. Acute lung injury         2. Acute respiratory distress syndrome (ARDS)   I. Respiratory Care COPD Protocol indications:         1. History of COPD in patient's records         2. Smoking history   J. Home Oxygen Assessment Protocol indications:         1. Chronic lung disease         2. Cor pulmonale         3. Unable to wean to room air 48 hours prior to anticipated discharge.    K.  Ventilator Weaning Protocol indications: 1. Patient's mechanically ventilated          2. Managed by intensivist  ELIZABETH Lung Volume Expansion Protocol indications:        1. Any patient at risk for pulmonary complications. VI. Maintenance:    A. Timely patient assessment is an integral part of this protocol therefore the following will be applied:  1. All non- critical care patients will be evaluated upon receiving initial respiratory care orders within 24 hours and re-evaluated within 48 hours (or more as needed). 2. Orders requesting a Respiratory Consult will be responded to in the following manner:  a. In patient emergency situations, the RCP assigned to the floor will respond immediately to the patient, provide an initial respiratory assessment, and contact the patient's physician as necessary for appropriate orders. b. In non-emergent situations, the RCP assigned to the floor will respond to the patient within 90 minutes and provide an initial respiratory assessment and contact patient's physician as necessary for appropriate orders. c. An RCP will provide a comprehensive assessment as soon as possible. 3. Upon completion of an evaluation, the RCP will complete documentation in the patient's EMR in the RT Assessment section. 4. The RCP who completes the assessment will document orders for therapy in the orders section of the patient's EMR selecting new order. Next, per protocol should be selected indicating it is a protocol order and sign orders should be selected to complete the process. The applicable protocol must be added to the progress note per Joint Commission guidelines. 5. The Pharmacy and Therapeutics (P&T) Committee has mandated that the medication Xopenex may be changed to unit dose albuterol without an order, except for those patients receiving Xopenex due to cardiac arrhythmias.    1. The dosage for these patients should be 0.63 mg. and may be changed from 1.25 mg. to 0.63 mg per P & T Committee by the RCP completing the assessment. 6. Patients who are not experiencing cardiac arrhythmias, and are ordered Xopenex and Atrovent may be changed to Duoneb. VII. Safety:        A. The following safety issues shall be monitored:  1. The RCP will perform hand hygiene per hospital policy utilizing Standard Precautions for all patients and following transmission-based isolation as indicated per hospital policy. 2. The RCP must exercise professional judgment in classifying the patient for frequency of therapy. 3. Appropriate classification of the patient will require an evaluation utilizing the Therapy Assessment Protocol Guidelines. 4. The RCP will confer with the physician concerning the care of the patient at any time questions or problems arise. 5. If during therapy, the patient exhibits no improvement, or deterioration in clinical status the RCP will notify the physician and the patient's nurse. VIII. Interventions:   A. The patient's nurse is responsible concerning all items related to his/her care. Ongoing communication with nursing is essential to successful protocol management. B. The RCP recognizes the value of the team approach in meeting the patient's needs. Nursing input regarding the patient's pulmonary condition will be sought as needed. IX. Reportable conditions: The RCP will inform the physician if:  1. There are acute changes in patient's respiratory status. 2. The therapist is unable to determine appropriate care plan upon assessment. 3. The patient fails to reach therapeutic objective. 4. A change or additional medication is needed. X.  Patient Education:    A. Patient will receive instruction on the followin. The treatment modality, including objectives and proper technique of therapy  2. Respiratory medications  B. Documentation shall occur in the patient education section of the patient's EMR. XI. Documentation: Record all findings as described above in the patient's EMR.     Related Protocols: A. Aerosolized Medication Protocol  B. Bronchial Hygiene   C. Oxygen Protocol   D. [facility] Fast Track Weaning Protocol  E. Asthma Treatment protocol ER  F. Alpha-1 antitrypsin Deficiency Protocol  G. Prone Positioning Protocol  H. Respiratory Care COPD Protocol  I. Home Oxygen assessment Protocol  J. Ventilator Weaning Protocols   K. Volume Expansion protocol    Indications      Frequency          Level  A. Aerosol therapy   1.  q4h     Severe SOB, wheezing, unable to sleep 1   2.  qid, q4 wa or q6h   Moderate SOB, wheezing   2   3.  tid      Hx of asthma, or COPD mild wheezing,         or facilitate secretion removal              3   4.  bid      Asthma, or COPD, Intermittent wheezing 4   5. PRN, i.e. tid PRN, qid PRN Asthma, or COPD, occasional wheezing 5       B. Bronchopulmonary Hygiene    1. q4h             Copious secretions, SOB, unable to sleep 1   2. qid & PRN            Moderate amounts of secretions   2   3. tid           Small amounts of secretions and poor cough,               history of secretions    3    4. PRN, i.e. tid PRN, qid PRN     Breathing exercises, encourage cough only 4      C. Oxygen Therapy     Follow hospital approved Oxygen Protocol      Note:  qid treatments are due 0800, 1200, 1600, and 2000. tid treatments are due 0800, 1400, and 2000  Q6h treatments are due 0800, 1400, 2000, 0200  Q4 wa teatments are due 0800, 1200, 1600, and 2000. Q4h treatments are due  0800, 1200, 1600, 2000, 0000, and 0400. The Level 1-5 rating system is only to be used as criteria for determining appropriate frequency of therapy. References:   N   Joint Commission Consolidated Paul Standard   L    Respiratory Care Department Policy, Procedure and Protocol Guideline Manual, 1995, ABAD Dobbins. L  Therapist Driven Respiratory Care Protocols - A Practitioner's Guide for Criteria-Based Respiratory Care by Mi Bauer M.D., and ABAD You, RRT.    L  The rationale for therapist-driven protocols: an update. Respiratory Care 1998; 37:353-201   L Therapist Driven Respiratory Care Protocols - A Practitioner's Guide for Criteria-Based Respiratory Care by Bryanna Harvey M.D., and AKIL Bass The rationale for therapist-driven protocols: an update. Respiratory Care 1998; [id number]. N   [facility] Clinical Practice Guidelines. A. The RCP will perform a respiratory assessment in the following manner:  1. Perform hand hygiene per hospital policy utilizing Standard Precautions for all patients and following transmission-based isolation as indicated per policy. 2. Identify patient via ID bracelet verifying patient name and birth date. 3. General observations: color, pattern and effort of breathing, chest expansion, (symmetrical and bilateral), level of consciousness and the ability to ambulate. 4. The RCP will assess patients cough ability and determine if Nasotracheal suctioning is needed. If patient is unable to produce sputum, at that time, the RCP should question the patient with regard to their sputum: production, color consistency, frequency and amount. 5. Auscultation: Using a stethoscope, the RCP will listen and note quality of breath sounds and presence or absence of adventitious breath sounds in all lung fields, both anteriorly and posteriorly. 6. Upon completing the EMR review and physical assessment, the RCP will document findings in the RT Assessment section of the EMR. The score level will be provided and will be used to determine the frequency of therapy. V. Indications:   A. Indications for Bronchial Hygiene Protocol will include:  1. Potential for or presence of atelectasis. 2. Need for hydration and removal of retained secretions. 3. Need for improvement of cough effectiveness. 4. Presence of conditions associated with disorder of pulmonary clearance:  a. Cystic fibrosis  b. Bronchiectasis  B.   Indications for Aerosolized Medication(s) Protocol should include:  1. Treatment of bronchospasm/wheezing  2. Improvement of mucociliary clearance  3. Treatment of stridor  4. History of Asthma or COPD             C.  Indications for Oxygen Therapy Protocol should include:  1. Documented hypoxemia  2. Severe trauma  3. Acute myocardial infarction  4. Short-term therapy (e.g. post anesthesia recovery)    VI. Maintenance:    A. Timely patient assessment is an integral part of this protocol therefore the following will be applied:  1. All non- critical care patients will be evaluated upon receiving initial respiratory care orders within 24 hours and re-evaluated within 48 hours (or more as needed). 2. Orders requesting a Respiratory Consult will be responded to in the following manner:  a. In patient emergency situations, the RCP assigned to the floor will respond immediately to the patient, provide an initial respiratory assessment, and contact the patients physician as necessary for appropriate orders. b. In non-emergent situations, the RCP assigned to the floor will respond to the patient within 90 minutes and provide an initial respiratory assessment and contact patients physician as necessary for appropriate orders. c. An RCP will provide a comprehensive assessment as soon as possible. 3. Upon completion of an evaluation, the RCP will complete documentation in the patients EMR in the RT Assessment section. 4. The RCP who completes the assessment will document orders for therapy in the orders section of the patients EMR selecting new order. Next, per protocol should be selected indicating it is a protocol order and sign orders should be selected to complete the process. 5. The Pharmacy and Therapeutics (P&T) Committee has mandated that the medication Xopenex may be changed to unit dose albuterol without an order, except for those patients receiving Xopenex due to cardiac arrhythmias.  The dosage for these patients should be 0.63 mg. and may be changed from 1.25 mg. to 0.63 mg per P & T Committee by the RCP completing the assessment. 6. Patients who are not experiencing cardiac arrhythmias, and are ordered Xopenex and Atrovent may be changed to Duoneb. VII. Safety:        A. The following safety issues shall be monitored:  1. The RCP will perform hand hygiene per hospital policy utilizing Standard Precautions for all patients and following transmission-based isolation as indicated per hospital policy. 2. The RCP must exercise professional judgment in classifying the patient for frequency of therapy. 3. Appropriate classification of the patient will require an evaluation utilizing the Therapy Assessment Protocol Guidelines. 4. The RCP will confer with the physician concerning the care of the patient at any time questions or problems arise. 5. If during therapy, the patient exhibits no improvement or deterioration in clinical status the RCP will notify the physician and the patients nurse. VIII. Interventions:   A. The patients nurse is responsible concerning all items related to his/her care. Ongoing communication with nursing is essential to successful protocol management. B. The RCP recognizes the value of the team approach in meeting the patients needs. Nursing input regarding the patients pulmonary condition will be sought as needed. IX. Reportable conditions: The RCP will inform the physician if:  1. There are acute changes in patients respiratory status. 2. The therapist is unable to determine appropriate care plan upon assessment. 3. The patient fails to reach therapeutic objective. 4. A change or additional medication is needed. X.  Patient Education:    A. Patient will receive instruction on the followin. The treatment modality, including objectives and proper technique of therapy  2. Respiratory medications  B. Documentation shall occur in the patient education section of the patients EMR. XI.  Documentation: Record all findings as described above in the patients EMR. Related Protocols: A. Aerosolized Medication Protocol  B. Bronchial Hygiene  C. Oxygen Protocol   D. Volume Expansion/Secretion Clearance  E. Ventilator Weaning Protocols    References:  N   Joint Commission Consolidated Paul Standard   L    Respiratory Care Department Policy, Procedure and Protocol Guideline Manual, 1995, ABAD BROWN  Therapist Driven Respiratory Care Protocols - A Practitioners Guide for Criteria-Based Respiratory Care by Faye Evans M.D., and AKIL Montanez  The rationale for therapist-driven protocols: an update. Respiratory Care 1998; [facility] Guidelines. Respiratory Care Services Policy Number: -[id number]    Title: Aerosolized Medication Protocol    Effective Date: 10/1998    Revised Date: 06/13, 03/16, 11/17, 07/19     Reviewed Date: 05/14/ 03/15 , 06/17, 5/18   I. Policy: The Aerosolized Medication Protocol shall by implemented by Respiratory Care Practitioners (RCP) for patients with orders to receive aerosol therapy with medication. II. Purpose: To open and maintain obstructed airways, the RCP, will utilize the following   protocol to select the indicated aerosolized medication(s) and determine the most effective method of delivery to the patient. III. Patient Type: All patients who are determined to meet aerosolized medication criteria as          outlined in this protocol. IV. Responsibility: Director, [address], registered Respiratory Care Practitioners (RCP's) with documented competency in the performance of respiratory therapeutic techniques. V. Equipment needed:  C. Stethoscope  D. Pulse oximeter  E. AeroEclipse nebulizer  F. Dry Powder Inhaler (DPI)     VI. Protocol:   C. The following conditions are accepted indications for aerosolized medication therapy. 5. Bronchospasm/wheezing  6.  Impaired mucociliary clearance  7. Tracheobronchial mucosal congestion/and laryngeal stridor  8. Diseases which commonly require aerosolized medication therapy include, but are not limited to:  f. Asthma/reactive airway disease  g. Bronchitis/emphysema (COPD)  h. Cystic fibrosis  i. Severe laryngitis/tracheitis  j. Bronchiectasis  k. Smoke inhalation or chemical trauma to the lung or upper airway  l. Physical trauma to the upper airway  m. Laryngotracheobronchitis  n. Bronchiolitis  o. Non-specific wheezing              B. Indications for bronchodilator medications will include:  d. Bronchospasm/ wheezing  e. Asthma/reactive airway disease  f. Chronic obstructive pulmonary disease  g. Obstructive defect on pulmonary function testing  C. Administration of medications  5. If a bronchodilator or any other type of respiratory medication is needed, a physician order must be indicated in the medication section in the patient's EMR. 6. When the physician specifies the medication and dosage at the time of request, the ordered medication will be used as part of the care plan. D. The following guidelines will be utilized in the evaluation and selection of the appropriate delivery device for indicated medication(s):  1. Unassisted aerosol (UA) is the preferred method of aerosol delivery and indicated if  c. Ventilation is inadequate  d. Patient demonstrates wheezing   e. Routine treatments shall be given via the AeroEclipse nebulizer. f. The Aerogen nebulizer shall be used in the following circumstances:  i. ER patients and they will continue with this nebulizer if admitted to 8th floor or ICU.  ii. Patients in ICU   iii. Patients on 8th floor with severe wheezing (at the RCP's discretion)  2. Dry PowderInhaler (DPI)   d. Patient should be alert/cooperative  e. Able to perform 3 second breath hold.   f. Patient has used DPI therapy previously, either at home or in the hospital.  g. Note: The only approved inhaler on formulary is Spiriva. VII. Guidelines:   Monitor patient's vital signs and evaluate patient's clinical status. The need to change medication and/or modality may be indicated by:  5. A pulse greater than 120 bpm, or if a pulse increase of 20 bpm occurs with bronchodilator medications. 6. Significant worsening of dyspnea or wheezing occurring during or within 30 minutes of discontinuing therapy. 7. Worsening of patient's sensorium (e.g. patient becomes confused or obtunded, and unable to follow directions). 8. Worsening of patient's chest x-ray. 9. Change in sputum (e.g. increased pulmonary infiltrate, which might indicate need for volume expansion therapy). 10. Patient has difficulty coughing up secretions, which might indicate need for acetylcysteine and/or bronchial hygiene therapy. 11. Call physician immediately if dyspnea worsens and is not responsive to modifications allowed by protocol. VIII. Clinical Responsibility:  2. The therapy assessment guidelines will be used to evaluate all patients receiving aerosolized medications with the exception of critical care areas. 5. RCP's will perform changes in therapy per protocol. 6. It will be the responsibility of RCP to provide instruction regarding respiratory medications, possible side effects, aerosol therapy and proper DPI technique, as well as, spacer usage to patients ordered DPI therapy. 7. Current therapy that is part of a patient's home regimen will not be discontinued. a. Provide spacer and educate patient on proper inhaler technique if needed. IX. Documentation  A. Document assessment findings in the respiratory assessment section of the patient's EMR. B. Document changes in therapy per protocol in the respiratory orders section and in the care plan section of the patient's EMR. C. Document patient education in the patient education section of the patient's EMR. X. Outcome Criteria:  B. Relief of wheezes and obstruction  C.  Improved cough and sputum color and consistency  D. Improved chest x-ray  E. Improved arterial oxygen tension and or SaO2  F. Improved Peak Flow on asthmatic patients        XI. Related Protocols:  B. Respiratory Patient Care Protocols  C. Bronchial Hygiene Therapy  D. Oxygen Protocol    Reference:  L - Respiratory Care Department Policy, Procedure and Protocol Guideline Manual, 1995, ABAD Parks L -  Therapist Driven Respiratory Care Protocols - A Practitioner's Guide for Criteria-Based Respiratory Care by Clarisse Perez M.D., and ABAD Michel RRT. L - The rationale for therapist-driven protocols: an update. Respiratory Care 1998; [id number]. N -[facility] Clinical Practice Guidelines. Respiratory Care Services     Policy Number: -[id number]    Title: Oxygen Protocol    Effective Date: 01/1996    Revised Date: 06/2013, 02/29/2016, 4/2018, 7/2019    Reviewed Date: 05/2014, 03/2015, 06/2017        I. Policy: The Oxygen Protocol will be initiated for all patients upon written order from physician for administration of oxygen therapy or if a patient is found to have an oxygen saturation of 88% or less. Special consideration: the goal of oxygen therapy for COPD patients is to maintain oxygen saturation between 88% - 92% to comply with GOLD Guidelines. II. Purpose: To provide protocol driven respiratory therapy for the administration of oxygen at concentrations greater than that in ambient air with the intent of treating or preventing the symptoms and manifestations of hypoxia. III. Responsibility: Director Respiratory Care Services, all Respiratory Care Practitioners     IV. Indications:   G. Implement this protocol for patients when physician orders oxygen to be administered or when patient is found to have an oxygen saturation of 88% or less. H. To assure routine monitoring of patient's oxygen saturation b.i.d. and to make appropriate adjustments in accordance with ordered oxygen saturation parameters.   I. To assure continuity of respiratory care that meets [facility] Clinical Practice Guidelines and GOLD Guidelines. J. Hb < 8  K. Sickle Cell anemia crisis    V. Assessment:  Assess the following parameters to determine the need to adjust oxygen:  D. Measurement of patient's oxygen saturation via pulse oximetry. E. Observation of patient's color, respiratory effort, and responsiveness. F. Measurement of heart rate and respiratory rate. G. Complete a three-step ambulatory oxygen saturation when ordered. VI. Initiation:  Upon receipt of an order for oxygen, the RCP will:   G. Verify order in the patient's EMR, which should include the desired oxygen saturation to be maintained. H. The patient shall be placed on oxygen with humidity (except for those oxygen delivery devices that do not require humidity, i.e. venturi masks and non-rebreather masks) as ordered by the physician to achieve the prescribed oxygen saturation. I. In the event that no saturation is specified, a saturation of 90% will be maintained. J. Patients, who are found to have a SaO2 of 88% or less, may be started on supplemental oxygen as described above. K. Patients admitted with cardiac problems/disease shall be maintained at 92% per Chest Committee recommendation. L. The patient will be informed of the \"no smoking policy\" and instructed in the proper use of oxygen therapy. M. Once desired oxygen saturation has been achieved, the RCP will document FIO2 and oxygen saturation in the respiratory section of the patient's EMR. VII. Maintenance:   E. 30-second oxygen saturation check will be taken to maintain the saturation ordered by the physician each day. F. Patients will be assessed each shift and as needed by pulse oximetry to determine if oxygen needs to be decreased, increased or discontinued. G. If changes in FIO2 are indicated, all changes will be documented in the respiratory section of the patient's EMR.    H. If no changes in FIO2 are required, the patient's oxygen flow rate and saturation will be recorded in the respiratory section of the patient's EMR. I. Per [facility], patients who are receiving oxygen therapy but are not on oxygen at home, should be weaned off oxygen as soon as possible or when anticipated discharge becomes evident. J. Oxygen will be discontinued after oxygen saturation has been maintained for 24 hours on room air and documented in the patient's EMR. K. Patients on the Inpatient Rehabilitation area on 9th floor will be exempt from having their oxygen discontinued per protocol. Oxygen may be weaned but will be changed to prn to meet the needs of the patient when exercising and participating in therapy. L. The goal of oxygen therapy is to maintain patients with a diagnosis of COPD at oxygen saturation between 88% - 92% to comply with GOLD Guidelines. VIII. Safety: RCP will address the following safety issues:  C. Identify patient using the two patient identifiers name and birth date via ID bracelet. D. Perform hand hygiene per hospital policy utilizing Standard Precautions for all patients and following transmission-based isolation as indicated per hospital policy. E. Cardiac patients will be maintained at an oxygen saturation of 92%. F. If a patient's FIO2 requirements necessitate changing oxygen delivery devices to a high concentration of oxygen, documentation indicating the change must be included in the progress notes, as well as in the respiratory flowsheet. G. If a patient has a hemoglobin level <8 mg. RCP will consult physician before discontinuing oxygen. IX. Interventions:   C. RCP will assess patient for signs of respiratory distress or suspicion of CO2 retention. D. An ABG may be obtained for patients exhibiting respiratory distress or sickle cell      crisis. E. An order should be entered into patient's EMR for ABG under per protocol. X. Documentation  D.  Document assessment findings in the respiratory section of the patient's EMR. E. Document changes in therapy per protocol in the respiratory orders section and in the care plan section of the patient's EMR. F. Document patient education in the patient education section of the patient's EMR. XI. Reportable Conditions:  Report to the physician immediately:  B. Acute changes in patient's respiratory status. C. An oxygen saturation <85%. D. A change in oxygen delivery device to provide a high concentration of oxygen. XII. Patient Instructions: Review with Patient  B. Purpose of oxygen therapy  C. Proper technique for using oxygen  D. No smoking policy    Approval: Pulmonary Committee (1-25-96)  Revision: Chest Committee (4-28-05)    L - Respiratory Care Department Policy, Procedure and Protocol Guideline Manual, 1995,         ABAD Dobbins. L - Therapist Driven Respiratory Care Protocols - A Practitioner's Guide for Criteria-Based       Respiratory Care by Meg Waite M.D., and ABAD Houston, MIKE. L - The rationale for therapist-driven protocols: an update. Respiratory Care 1998. N - [facility] Clinical Practice Guidelines.

## 2020-12-22 NOTE — PROGRESS NOTES
present via phone for Diabetes Education with Bill Cedeño, ALEXEY    Thank you,          [address]  [phone] (phone)

## 2020-12-22 NOTE — DIABETES MGMT
Patient admitted with acute respiratory failure with hypoxia, positive for COVID-19 virus. Blood glucose ranged 200-411 yesterday with patient receiving Lantus 10 units, Humalog 43 units, and Decadron 6mg. Blood glucose this morning was 247. Most recent FSBS 344. Reviewed patient current regimen: Lantus 25 units daily, Humalog 14 units with meals, and Humalog SSI. Patient seen via telehealth regarding diabetes education, interpretering services utilized as patient speaks [facility] [place] Patient given educational material, \"Diabetes Self-Management: A Patient Teaching Guide\", to read over tonight. Patient also given educational handouts regarding diabetes management. All materials provided in [facility] [place] Educated patient regarding educational handouts. Described proper diabetic foot care and the importance of checking feet daily. Patient voices numbness and tingling in feet. Patient given educational handout regarding alternative beverages to help improve glycemic control. Patient given educational handout regarding \"plate method\" of healthy meal planning. Educated regarding plate method and discussed importance of portion control. Patient given educational handout regarding the basics of carb counting. Patient given educational handouts regarding insulin which were reviewed with patient. Patient instructed on the preparation and injection of insulin dose via vial and syringe method. Patient returned demonstrated proper insulin injection technique using injection model, syringe, and vial of saline. Nursing will continue to have patient practice insulin self-injection when insulin dose is due and document progress or refusals of insulin practice in progress notes. Patient verbalized understanding of site rotation, storage of insulin, and proper sharps disposal. Patient was also instructed in proper use of insulin pens.  Patient was able to return demonstrate proper use of insulin pen using injection model and saline demo pen. Patient prefers insulin vials due to cost. Patient will need prescription for ReliOn insulin at discharge would recommend simplification of regimen if possible to aid in compliance. 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 Discussed that if the car gets hot patient could use lunch box with cooling pack to keep insulin from over-heating. Patient verbalized understanding. Explained the importance of blood glucose monitoring to patient and how this will provide their primary care provider with more information to help them safely titrate their regimen. Educated patient to seek out affordable glucometer options that exist at some stores or drug stores as patient is uninsured. Patient given glucometer with 10 lancets and 10 strips. Patient politely declines glucometer instruction stating she is comfortable with this. Educated patient regarding basal/bolus regimen including type of insulin, timing with meals, onset, duration of effect, and peak of insulin dose. Patient successful with using sliding scale insulin chart and practice scenario. Patient verbalizes understanding regarding teaching. Patient given educational handout regarding ReliOn insulins as a more affordable option. Patient educated on the different types of insulins including Regular, NPH, and mixed insulins. Discussed increased risk of hypoglycemia when using NPH or mixed insulins versus Lantus, the importance of eating regularly when taking those types of insulin, and reviewed s/s treatments of hypoglycemia again. Patient verbalized understanding. Encouraged compliance with discharge regimen. Encouraged patient to continue to work on lifestyle modifications and to [facility] of regimen. Patient verbalized understanding and voices no further questions regarding diabetes management at this time. Patient to read over educational materials and diabetes self management a patient teaching guide tonight.

## 2020-12-22 NOTE — PROGRESS NOTES
rounded on patient with nurse. Interpreting services offered when needed. [facility] staff  available over the phone from 3:30 p.m. - midnight. Please call [place] at [phone] with any interpreting requests.       [address] [facility]  Language Services Department  [place] [address]  [phone] (phone)

## 2020-12-22 NOTE — PROGRESS NOTES
VSS. OOB bathroom self. IVF infusing, 02 on. Hourly rounds completed. Resting in bed. Denies needs or pain at this time. Bed in low locked position. Call light and personal items within reach. Will continue to monitor and give report to oncoming day shift nurse.

## 2020-12-22 NOTE — PROGRESS NOTES
Used mobile . Plasma infusing. Pt gave herself her insulin. Denies any pain or needs except a  to charge phone so she can call son. Encouraged to call for needs.

## 2020-12-22 NOTE — PROGRESS NOTES
Albuterol changed to q6 prn per respiratory protocol. Oxygen ordered to maintain SaO2>90% per Oxygen protocol. Patient is currently on 2L NC.

## 2020-12-22 NOTE — PROGRESS NOTES
Hospitalist Progress Note    2020  Admit Date: 2020  7:38 PM   NAME: Geraldo You   :  1980   MRN:  [id number]   Attending: Adrianna Whittington MD  PCP:  Antonio Florez MD    SUBJECTIVE:   Geraldo You is a 38 y.o. female with a past medical history of DM type II, GERD who presents to the ER with report of nausea, vomiting, abdominal pain for the past 5 days. She admits to worsening fevers and chills as well. She tested positive for COVID 4 days ago. Febrile on admission and tachycardic. Now on [facility] for RR28. She has been started on treatment for COVID-19    No new issues. Appetite good. Review of Systems negative with exception of pertinent positives noted above  PHYSICAL EXAM     Visit Vitals  /61 (BP 1 Location: Right arm, BP Patient Position: At rest)   Pulse 62   Temp 98.1 °F (36.7 °C)   Resp 18   Ht 5' 6\" (1.676 m)   Wt 84.9 kg (187 lb 3.2 oz)   SpO2 93%   BMI 30.21 kg/m²      Temp (24hrs), Av.2 °F (36.8 °C), Min:97.9 °F (36.6 °C), Max:98.9 °F (37.2 °C)    Oxygen Therapy  O2 Sat (%): 93 % (20)  Pulse via Oximetry: 66 beats per minute (20)  O2 Device: Nasal cannula (20)  O2 Flow Rate (L/min): 2 l/min (20)    Intake/Output Summary (Last 24 hours) at 2020 0839  Last data filed at 2020 0409  Gross per 24 hour   Intake 897 ml   Output --   Net 897 ml      General: No acute distress   Lungs:  Lungs CTA BL  Heart:  Regular rate and rhythm,  No murmur, rub, or gallop  Abdomen: Soft, Non distended, Non tender, Positive bowel sounds  Extremities: No cyanosis, clubbing or edema  Neurologic:  No focal deficits    CT ABD PELV W CONT   Final Result   IMPRESSION:      Bibasilar atelectasis or pneumonia. Date of Dictation: 2020 11:09 PM            XR CHEST PORT   Final Result   IMPRESSION: Mild bibasilar atelectasis or pneumonia.             ASSESSMENT      Active Hospital Problems    Diagnosis Date Noted    COVID-19 12/20/2020    Viral gastritis 12/20/2020    Acute respiratory failure with hypoxia ([facility] 12/19/2020    Sepsis ([facility] 11/24/2019    Diabetes ([facility] 11/24/2019     Plan:    Acute respiratory failure with hypoxia  Covid-19 Pneumonia  Procal Neg. D-Dimer neg. COVID-19 POSITIVE on 12/15. XR chest shows mild bibasilar atelectasis or PNA. - Convalescent plasma given 12/21  - Dexamethasone for 10 days (12/20-12/30)  - Remdesivir for 5 days (12/20-12/25)  - Zinc/vitamin C daily  - Added IS and albuterol prn  - Prone as tolerated  - SSI while on steroids  - Wean O2 as tolerated - on [facility] - Pending sale to [facility]  - monitor renal and hepatic function while on remdesivir.     Sepsis, resolved    Viral gastritis, improved  Most likely 2/2 to Covid infection per above  Supportive care    DM2, uncontrolled  A1c 11.2  - holding home Sitagliptin  - increase Lantus and prandial insulin  - DM2 educator following    DVT Prophylaxis: Lovenox SQ  Dispo: d/c home, likely 1-2 days    Signed By: Jono Fenton MD     December 22, 2020

## 2020-12-22 NOTE — PROGRESS NOTES
Explained to pt about receiving plasma. She voiced concerns about having an allergic reaction. With the help of my coworker Silvia Elizondo RN who speaks [place], she helped to interpret for me and answer questions. Pt agreed to receive plasma and signed the consent. It was assured that all pt's questions/concerns was answered.

## 2020-12-22 NOTE — PROGRESS NOTES
rounded on patient with nurse. Interpreting services offered when needed. [facility] staff  available over the phone from 3:30 p.m. - midnight. Please call [place] at [phone] with any interpreting requests.       [facility]  Language Services Department  [address]  [address] [phone] (phone)

## 2020-12-23 VITALS
RESPIRATION RATE: 18 BRPM | BODY MASS INDEX: 30.08 KG/M2 | DIASTOLIC BLOOD PRESSURE: 71 MMHG | HEART RATE: 63 BPM | TEMPERATURE: 98.3 F | SYSTOLIC BLOOD PRESSURE: 114 MMHG | OXYGEN SATURATION: 95 % | HEIGHT: 66 IN | WEIGHT: 187.2 LBS

## 2020-12-23 LAB
ALBUMIN SERPL-MCNC: 2.8 G/DL (ref 3.5–5)
ALBUMIN/GLOB SERPL: 0.6 {RATIO} (ref 1.2–3.5)
ALP SERPL-CCNC: 80 U/L (ref 50–136)
ALT SERPL-CCNC: 45 U/L (ref 12–65)
ANION GAP SERPL CALC-SCNC: 7 MMOL/L (ref 7–16)
AST SERPL-CCNC: 25 U/L (ref 15–37)
BILIRUB SERPL-MCNC: 0.3 MG/DL (ref 0.2–1.1)
BUN SERPL-MCNC: 15 MG/DL (ref 6–23)
CALCIUM SERPL-MCNC: 8.2 MG/DL (ref 8.3–10.4)
CHLORIDE SERPL-SCNC: 105 MMOL/L (ref 98–107)
CO2 SERPL-SCNC: 25 MMOL/L (ref 21–32)
CREAT SERPL-MCNC: 0.55 MG/DL (ref 0.6–1)
GLOBULIN SER CALC-MCNC: 4.4 G/DL (ref 2.3–3.5)
GLUCOSE BLD STRIP.AUTO-MCNC: 189 MG/DL (ref 65–100)
GLUCOSE BLD STRIP.AUTO-MCNC: 309 MG/DL (ref 65–100)
GLUCOSE BLD STRIP.AUTO-MCNC: 401 MG/DL (ref 65–100)
GLUCOSE SERPL-MCNC: 209 MG/DL (ref 65–100)
MAGNESIUM SERPL-MCNC: 2.2 MG/DL (ref 1.8–2.4)
PHOSPHATE SERPL-MCNC: 3.2 MG/DL (ref 2.5–4.5)
POTASSIUM SERPL-SCNC: 3.7 MMOL/L (ref 3.5–5.1)
PROT SERPL-MCNC: 7.2 G/DL (ref 6.3–8.2)
SODIUM SERPL-SCNC: 137 MMOL/L (ref 136–145)

## 2020-12-23 PROCEDURE — 74011636637 HC RX REV CODE- 636/637: Performed by: INTERNAL MEDICINE

## 2020-12-23 PROCEDURE — 97530 THERAPEUTIC ACTIVITIES: CPT

## 2020-12-23 PROCEDURE — 74011636637 HC RX REV CODE- 636/637: Performed by: FAMILY MEDICINE

## 2020-12-23 PROCEDURE — 74011250637 HC RX REV CODE- 250/637: Performed by: INTERNAL MEDICINE

## 2020-12-23 PROCEDURE — 74011250637 HC RX REV CODE- 250/637: Performed by: FAMILY MEDICINE

## 2020-12-23 PROCEDURE — 84100 ASSAY OF PHOSPHORUS: CPT

## 2020-12-23 PROCEDURE — 74011000250 HC RX REV CODE- 250: Performed by: FAMILY MEDICINE

## 2020-12-23 PROCEDURE — 80053 COMPREHEN METABOLIC PANEL: CPT

## 2020-12-23 PROCEDURE — 83735 ASSAY OF MAGNESIUM: CPT

## 2020-12-23 PROCEDURE — 82962 GLUCOSE BLOOD TEST: CPT

## 2020-12-23 PROCEDURE — 74011000258 HC RX REV CODE- 258: Performed by: FAMILY MEDICINE

## 2020-12-23 PROCEDURE — 74011250636 HC RX REV CODE- 250/636: Performed by: FAMILY MEDICINE

## 2020-12-23 RX ORDER — FAMOTIDINE 20 MG/1
20 TABLET, FILM COATED ORAL 2 TIMES DAILY
Status: DISCONTINUED | OUTPATIENT
Start: 2020-12-23 | End: 2020-12-23 | Stop reason: HOSPADM

## 2020-12-23 RX ORDER — PEN NEEDLE, DIABETIC 30 GX3/16"
NEEDLE, DISPOSABLE MISCELLANEOUS 4 TIMES DAILY
Qty: 1 PACKAGE | Refills: 11 | Status: SHIPPED | OUTPATIENT
Start: 2020-12-23

## 2020-12-23 RX ORDER — METFORMIN HYDROCHLORIDE 500 MG/1
1000 TABLET ORAL 2 TIMES DAILY WITH MEALS
Qty: 60 TAB | Refills: 1 | Status: SHIPPED | OUTPATIENT
Start: 2020-12-23

## 2020-12-23 RX ORDER — LANCETS
EACH MISCELLANEOUS
Qty: 1 PACKAGE | Refills: 11 | Status: SHIPPED | OUTPATIENT
Start: 2020-12-23

## 2020-12-23 RX ORDER — INSULIN PUMP SYRINGE, 3 ML
EACH MISCELLANEOUS
Qty: 1 KIT | Refills: 0 | Status: SHIPPED | OUTPATIENT
Start: 2020-12-23

## 2020-12-23 RX ORDER — DEXAMETHASONE 6 MG/1
6 TABLET ORAL
Qty: 6 TAB | Refills: 0 | Status: SHIPPED | OUTPATIENT
Start: 2020-12-23 | End: 2020-12-29

## 2020-12-23 RX ADMIN — INSULIN LISPRO 2 UNITS: 100 INJECTION, SOLUTION INTRAVENOUS; SUBCUTANEOUS at 07:30

## 2020-12-23 RX ADMIN — Medication 10 ML: at 14:00

## 2020-12-23 RX ADMIN — Medication 10 ML: at 05:17

## 2020-12-23 RX ADMIN — REMDESIVIR 100 MG: 100 INJECTION, POWDER, LYOPHILIZED, FOR SOLUTION INTRAVENOUS at 12:22

## 2020-12-23 RX ADMIN — DEXAMETHASONE SODIUM PHOSPHATE 6 MG: 10 INJECTION INTRAMUSCULAR; INTRAVENOUS at 09:00

## 2020-12-23 RX ADMIN — OXYCODONE HYDROCHLORIDE AND ACETAMINOPHEN 1000 MG: 500 TABLET ORAL at 08:53

## 2020-12-23 RX ADMIN — INSULIN LISPRO 14 UNITS: 100 INJECTION, SOLUTION INTRAVENOUS; SUBCUTANEOUS at 11:30

## 2020-12-23 RX ADMIN — FAMOTIDINE 20 MG: 10 INJECTION INTRAVENOUS at 08:53

## 2020-12-23 RX ADMIN — INSULIN LISPRO 14 UNITS: 100 INJECTION, SOLUTION INTRAVENOUS; SUBCUTANEOUS at 07:30

## 2020-12-23 RX ADMIN — INSULIN GLARGINE 25 UNITS: 100 INJECTION, SOLUTION SUBCUTANEOUS at 09:00

## 2020-12-23 RX ADMIN — INSULIN LISPRO 10 UNITS: 100 INJECTION, SOLUTION INTRAVENOUS; SUBCUTANEOUS at 16:30

## 2020-12-23 RX ADMIN — OXYCODONE HYDROCHLORIDE AND ACETAMINOPHEN 1000 MG: 500 TABLET ORAL at 17:28

## 2020-12-23 RX ADMIN — Medication 220 MG: at 08:53

## 2020-12-23 RX ADMIN — INSULIN LISPRO 5 UNITS: 100 INJECTION, SOLUTION INTRAVENOUS; SUBCUTANEOUS at 16:30

## 2020-12-23 RX ADMIN — ENOXAPARIN SODIUM 30 MG: 30 INJECTION SUBCUTANEOUS at 08:51

## 2020-12-23 RX ADMIN — INSULIN LISPRO 8 UNITS: 100 INJECTION, SOLUTION INTRAVENOUS; SUBCUTANEOUS at 11:30

## 2020-12-23 RX ADMIN — FAMOTIDINE 20 MG: 20 TABLET, FILM COATED ORAL at 17:28

## 2020-12-23 NOTE — PROGRESS NOTES
Chart screened by  for discharge planning. Per RT notes 12/22/2020 patient is requiring 2L supplemental oxygen. Patient will return home at discharge. Patient will need to follow up with the [facility] or [facility] as patient is Medicaid Pending. Patient will need to be weaned from O2 as she is uninsured at this time. CM will continue to follow patient during hospitalization for discharge planning and needs. Please consult or notify  if any new issues arise.

## 2020-12-23 NOTE — DIABETES MGMT
Patient positive for COVID-19 virus. Blood glucose ranged 237-344 yesterday with patient receiving Lantus 25 units, Humalog 54 units, and Decadron 6mg. Blood glucose this morning was 189. Reviewed patient current regimen: Lantus 25 units daily, Humalog 14 units with meals, Humalog SSI, and Decadron 6mg IV daily. Patient spoke with remotely from within hospital system utilizing interpretering services. Patient alert and correctly verbalized patient identifiers. More than 1.5 hours spent educating patient today. Patient previously given educational material, \"Diabetes Self-Management: A Patient Teaching Guide\", which was reviewed with patient. Patient was an active participate during education. Explained basic physiology of diabetes, as well as causes, signs and symptoms, and treatments for hypoglycemia and hyperglycemia. Patient states she was having increased thirst, weakness, headaches, and blurry vision. Patient receiving steroid therapy. Explained relationship between steroids and hyperglycemia to patient and educated patient that as steroids are tapered their glycemic control should improve and insulin needs should decrease as well. Described the effects of poor glycemic control and the development of long-term complications such as renal, eye, nerve, and cardiovascular disease. Per patient they typically drink \"blue top milk,\" no longer drinks soda or juice. Reviewed alternative beverages to help improve glycemic control. Per patient they typically eat breakfast-white bread with egg, apple for mid morning snack, lunch-chicken, 3 spoons of rice, green beans, afternoon snack-orange, evening snack-banana, dinner- similar to lunch and patient states if she is not working she typically goes to the food bank for her food.  Educated re: effects of carbohydrates on blood glucose, the \"plate method\" of healthy meal planning, basics of healthy meal plan, Consistent Carbohydrate Diet, discussed the importance of follow up with outpatient diabetes education for basics of carb counting and how to read a nutrition label. Educated patient regarding the importance of portion control. Discussed pairing carbs with healthy protein. Patient states \"I could eat almonds with my apple. \" Educated regarding simple versus complex carbs. Patient states \"I could eat wheat bread instead of white. \" Educated patient regarding the benefits of physical activity (as cleared by provider) on glycemic control. Also explained the relationship between hyperglycemia and infection and delayed healing. Discussed target goals for blood glucose and A1C as patient was unfamiliar with this. Educated patient regarding diabetic medications including mechanism of action, timing, and possible side effects. Patient verbalizes understanding of teaching. Provider plans to discharge patient on Novolin 70/30 vials. Educated regarding mixed insulin. Patient states \"I can get my insulin from [address] [place] [address]. \" Patient correctly verbalized insulin sites for administration, rotation of sites, storage of insulin, and proper sharps disposal. Reviewed importance of FSBS checks. Patient states she should check her sugar before breakfast and dinner prior to insulin administration. Discussed the risk of hypoglycemia when taking mixed insulin, eating consistently, and the importance of follow up with [facility]CP at [facility] as insulin needs may decrease; when steroid therapy is stopped in 5 days (per provider). Patient verbalized understanding. Primary RN given 1 box of 100 home use syringes for patient. Primary RN also given BD getting started with insulin booklet in Icelandic for patient. Primary RN to have patient practice drawing up practice insulin dose prior to discharge, patient made aware. Encouraged compliance with discharge regimen. Encouraged patient to continue to work on lifestyle modifications and to follow up with primary care provider for further titration of regimen. Patient verbalized understanding and voices no further questions regarding diabetes management.

## 2020-12-23 NOTE — PROGRESS NOTES
Patient is unable to afford the oxygen. WILL spoke with [facility] who said they would assist patient. [facility] stated they would like to have the address confirmed and also to make the patient aware as soon as she is finished with the equipment she is to call the company so they can  their equipment. WILL discussed this with RN who is unable to confirm address at this time. CM unable to enter room due to positive covid. Oxygen tank is outside of patient room, nurse will provide education and confirm address when able.

## 2020-12-23 NOTE — PROGRESS NOTES
Patient will need oxygen at discharge. Patient is uninsured. WILL reached out to [facility] who stated patient can get O2 it will be $100.00 per month and $10.00 for additional tanks, they will need to have a credit card on file. CM provided RN with the information as she will have to discuss this with the patient. CM sent referral for [facility] to follow up with patient regarding the new DM diagnosis.

## 2020-12-23 NOTE — PROGRESS NOTES
ACUTE PHYSICAL THERAPY GOALS:  (Developed with and agreed upon by patient and/or caregiver. )  LTG:  (1.)Ms. Lg Mcknight will move from supine to sit and sit to supine, scoot up and down and roll side to side in bed INDEPENDENTLY with bed flat within 7 treatment day(s). (2.)Ms. Lg Mcknight will transfer from bed to chair and chair to bed INDEPENDENTLY within 7 treatment day(s). (3.)Ms. Lg Mcknight will ambulate INDEPENDENTLY for 150+ feet with the least restrictive device within 7 treatment day(s). ________________________________________________________________________________________________      PHYSICAL THERAPY: Daily Note and AM Treatment Day # 2    Dipak Nj is a 38 y.o. female   PRIMARY DIAGNOSIS: Acute respiratory failure with hypoxia ([address]  Acute respiratory failure with hypoxia ([address] [J96.01]         ASSESSMENT:     REHAB RECOMMENDATIONS: CURRENT LEVEL OF FUNCTION:  (Most Recently Demonstrated)   Recommendation to date pending progress:  [address] [place] [address] Therapy  Equipment:    Rolling Walker Bed Mobility:   Supervision  Sit to Stand:   Contact Guard Assistance  Transfers:   Standby Assistance  Gait/Mobility:   Standby Assistance     ASSESSMENT:  Ms. Lg Mcknight presents with decreased activity tolerance requiring 2 L/min O2 during ambulation with SpO2 90% this AM. Noted losses of balance with ambulation with no assistive device requiring minimal assist for balance this AM. Stand by assist for ambulation with rolling walker, much improved balance appreciated. Patient increased ambulation distance this AM from initial evaluation. Dipak Nj is currently functioning below her baseline and would benefit from skilled PT during acute care stay to maximize safety and independence with functional mobility. SUBJECTIVE:   Ms. Lg Mcknight states, \"I feel better after walking. \"    SOCIAL HISTORY/ LIVING ENVIRONMENT: Independent with mobility, lives with son. Home Environment: Private residence  # Steps to Enter: 4  One/Two Story Residence: One story  Living Alone: No  Support Systems: Child(syed)  OBJECTIVE:     PAIN: VITAL SIGNS: LINES/DRAINS:   Pre Treatment: Pain Screen  Pain Scale 1: Numeric (0 - 10)  Pain Intensity 1: 0  Post Treatment: 0     O2 Device: Nasal cannula     MOBILITY: I Mod I S SBA CGA Min Mod Max Total  NT x2 Comments:   Bed Mobility    Rolling [] [] [] [x] [] [] [] [] [] [] []    Supine to Sit [] [] [] [x] [] [] [] [] [] [] []    Scooting [] [] [] [x] [] [] [] [] [] [] []    Sit to Supine [] [] [] [] [] [] [] [] [] [x] []    Transfers    Sit to Stand [] [] [] [x] [] [] [] [] [] [] []    Bed to Chair [] [] [] [x] [] [] [] [] [] [] []    Stand to Sit [] [] [] [x] [] [] [] [] [] [] []    I=Independent, Mod I=Modified Independent, S=Supervision, SBA=Standby Assistance, CGA=Contact Guard Assistance,   Min=Minimal Assistance, Mod=Moderate Assistance, Max=Maximal Assistance, Total=Total Assistance, NT=Not Tested    GAIT: I Mod I S SBA CGA Min Mod Max Total  NT x2 Comments:   Level of Assistance [] [] [] [x] [] [x] [] [] [] [] [] Significant improvement with rolling   Distance 61'    DME Rolling Walker    Gait Quality Increased trunk sway    I=Independent, Mod I=Modified Independent, S=Supervision, SBA=Standby Assistance, CGA=Contact Guard Assistance,   Min=Minimal Assistance, Mod=Moderate Assistance, Max=Maximal Assistance, Total=Total Assistance, NT=Not Tested    PLAN:   FREQUENCY/DURATION: PT Plan of Care: 3 times/week for duration of hospital stay or until stated goals are met, whichever comes first.  TREATMENT:     TREATMENT:   ($$ Therapeutic Activity: 23-37 mins    )  Therapeutic Activity (23 Minutes): Therapeutic activity included Supine to Sit, Transfer Training, Ambulation on level ground and Standing balance to improve functional Mobility, Strength and Activity tolerance.     AFTER TREATMENT POSITION/PRECAUTIONS:  Chair, Needs within reach and RN notified    INTERDISCIPLINARY COLLABORATION:  MD/PA/NP, RN/PCT, PT/PTA and Certified  via video chat    TOTAL TREATMENT DURATION:  PT Patient Time In/Time Out  Time In: 1110  Time Out: Kip Baugh PT, DPT

## 2020-12-23 NOTE — PROGRESS NOTES
Care Management Interventions  Mode of Transport at Discharge: Self  Transition of Care Consult (CM Consult): Discharge Planning  Discharge Durable Medical Equipment: Yes(Oxygen - [place])  Physical Therapy Consult: Yes  Occupational Therapy Consult: Yes  Speech Therapy Consult: No  Confirm Follow Up Transport: Self   Resource Information Provided?: No  Discharge Location  Discharge Placement: Home    Patient to discharge home today with oxygen provided by [facility]. CM sent referral to [facility], they will follow put with patient to get her to [facility]. Patient verified her address with nurse. CM left oxygen tank outside of patient room. RN will provide education with oxygen during discharge instructions. Patient to transport home with family. All milestones for discharge have been met.

## 2020-12-23 NOTE — PROGRESS NOTES
Interpreting services available.  Please call [phone] for assistance      [address]  [phone] (phone)

## 2020-12-23 NOTE — DISCHARGE SUMMARY
Hospitalist Discharge Summary     Patient ID:  Bonny Valdez  [id number]  38 y.o.  1980  Admit date: 12/19/2020  7:38 PM  Discharge date and time: 12/23/2020  Attending: Donia Shone, MD  PCP:  Nikkie Lovelace MD  Treatment Team: Attending Provider: Donia Shone, MD; Care Manager: Debbie Thornton, RN; Utilization Review: Rocky Figueroa, RN; Physical Therapist: Rosie Minaya, PT, DPT    Principal Diagnosis Acute respiratory failure with hypoxia [facility])   Principal Problem:    Acute respiratory failure with hypoxia ([facility] (12/19/2020)    Active Problems:    Sepsis ([facility] (11/24/2019)      Diabetes ([facility] (11/24/2019)      COVID-19 (12/20/2020)      Viral gastritis (12/20/2020)             Hospital Course:  Please refer to the admission H&P for details of presentation. In summary, Lukas Witt is a 38 y.o. female with a past medical history of DM type Eduardo Montgomery presents to the ER with report of nausea, vomiting, abdominal pain for the past 5 days. She admits to worsening fevers and chills as well. She tested positive for COVID 4 days ago. Febrile on admission and tachycardic. She has been started on treatment for COVID-19. She has been weaned down to [facility], feels well, and is wanting to go home. Ambulating pulse ox on day of discharge shows she needs 2LNC with rest and exertion. Acute respiratory failure with hypoxia  Covid-19 Pneumonia  Procal Neg. D-Dimer neg. COVID-19 POSITIVE on 12/15. XR chest shows mild bibasilar atelectasis or PNA.    - Convalescent plasma given 12/21  - Dexamethasone for 10 days (12/20-12/29)  - Remdesivir 12/20-12/23  - Wean O2 as tolerated - on 2LNC     Sepsis, resolved     Viral gastritis, resolved  Most likely 2/2 to Covid infection per above     DM2, uncontrolled  A1c 11.2  - resume Januvia, metformin  - discharging on Novolin 70/30 per DM educator recommendations (no insurance)  - DM2 educator following and has met with pt on several occasions    Significant Diagnostic Studies:   CT ABD PELV W CONT   Final Result   IMPRESSION:      Bibasilar atelectasis or pneumonia. Date of Dictation: 12/19/2020 11:09 PM            XR CHEST PORT   Final Result   IMPRESSION: Mild bibasilar atelectasis or pneumonia. Labs: Results:       Chemistry Recent Labs     12/23/20  0508 12/22/20  0407 12/21/20  0454   * 237* 200*    141 139   K 3.7 3.7 4.4    108* 108*   CO2 25 26 26   BUN 15 11 10   CREA 0.55* 0.46* 0.49*   CA 8.2* 7.7* 7.4*   AGAP 7 7 5*   AP 80 98 92   TP 7.2 6.8 7.3   ALB 2.8* 2.6* 2.6*   GLOB 4.4* 4.2* 4.7*   AGRAT 0.6* 0.6* 0.6*      CBC w/Diff Recent Labs     12/22/20  0407 12/21/20  0454   WBC 9.8 6.8   RBC 4.24 4.57   HGB 12.5 13.2   HCT 38.0 41.2    242   GRANS 79* 70   LYMPH 15 23   EOS 0* 0*      Cardiac Enzymes No results for input(s): CPK, CKND1, LISSETH in the last 72 hours. No lab exists for component: CKRMB, TROIP   Coagulation No results for input(s): PTP, INR, APTT, INREXT in the last 72 hours. Lipid Panel No results found for: CHOL, CHOLPOCT, CHOLX, CHLST, CHOLV, 021268, HDL, HDLP, LDL, LDLC, DLDLP, 569810, VLDLC, VLDL, TGLX, TRIGL, TRIGP, TGLPOCT, CHHD, CHHDX   BNP No results for input(s): BNPP in the last 72 hours. Liver Enzymes Recent Labs     12/23/20  0508   TP 7.2   ALB 2.8*   AP 80      Thyroid Studies No results found for: T4, T3U, TSH, TSHEXT         Discharge Exam:  Visit Vitals  /71 (BP 1 Location: Right arm, BP Patient Position: At rest)   Pulse 63   Temp 98.3 °F (36.8 °C)   Resp 18   Ht 5' 6\" (1.676 m)   Wt 84.9 kg (187 lb 3.2 oz)   SpO2 95%   BMI 30.21 kg/m²     General appearance: alert, cooperative, no distress, appears stated age  Lungs: clear to auscultation bilaterally  Heart: regular rate and rhythm, S1, S2 normal, no murmur, click, rub or gallop  Abdomen: soft, non-tender.  Bowel sounds normal. No masses,  no organomegaly  Extremities: no cyanosis or edema  Neurologic: Grossly normal    Disposition: home  Discharge Condition: stable  Patient Instructions:   Current Discharge Medication List      START taking these medications    Details   metFORMIN (GLUCOPHAGE) 500 mg tablet Take 2 Tabs by mouth two (2) times daily (with meals). Qty: 60 Tab, Refills: 1      dexAMETHasone (DECADRON) 6 mg tablet Take 1 Tab by mouth Daily (before breakfast) for 6 doses. Qty: 6 Tab, Refills: 0      Insulin Needles, Disposable, 31 gauge x 5/16\" ndle by SubCUTAneous route four (4) times daily. Qty: 1 Package, Refills: 11      insulin NPH/insulin regular (NOVOLIN 70/30, HUMULIN 70/30) 100 unit/mL (70-30) injection 26 Units by SubCUTAneous route two (2) times a day. Qty: 10 mL, Refills: 5      Blood-Glucose Meter monitoring kit glucometer  Qty: 1 Kit, Refills: 0      lancets misc 4x daily testing, dispense 1 box 200  Qty: 1 Package, Refills: 11      glucose blood VI test strips (ASCENSIA AUTODISC VI, ONE TOUCH ULTRA TEST VI) strip Test strips 4 x daily testing, dispense 100  Qty: 100 Strip, Refills: 5         CONTINUE these medications which have NOT CHANGED    Details   ondansetron (ZOFRAN ODT) 4 mg disintegrating tablet Take 1 Tab by mouth every eight (8) hours as needed for Nausea. Qty: 8 Tab, Refills: 2      promethazine (PHENERGAN) 25 mg tablet Take 1 Tab by mouth every six (6) hours as needed for Nausea for up to 15 doses. Indications: nausea  Qty: 15 Tab, Refills: 2      acetaminophen (TYLENOL EXTRA STRENGTH) 500 mg tablet Take 500 mg by mouth every six (6) hours as needed for Pain.      ketorolac (TORADOL) 10 mg tablet Take 1 Tab by mouth every six (6) hours as needed for Pain. Qty: 15 Tab, Refills: 0      SITagliptin (JANUVIA) 100 mg tablet Take 100 mg by mouth daily.          STOP taking these medications       benzonatate (Tessalon Perles) 100 mg capsule Comments:   Reason for Stopping:               Activity: Activity as tolerated  Diet: Diabetic Diet  Wound Care: None needed    Follow-up with PCP in 1 week  ·     Time spent to discharge patient 35 minutes  Signed:  Jose Armando Avendaño MD  12/23/2020  3:32 PM

## 2020-12-23 NOTE — PROGRESS NOTES
Oxygen Qualifier       Room air: SpO2 with O2 and liter flow   Resting SpO2  91%     Ambulating SpO2  88% 89% on 1L  90% on 2L       Completed by:    Michael Freitas RT

## 2020-12-24 NOTE — PROGRESS NOTES
Pt discharged home. Pt's family came and took her home. Hourly rounds completed. PIV removed. Pt education provided with  on insulin injection and prescription. Discharge instructions handed to pt. Pt demonstrated insulin administration and drawing the correct dose correctly. All ordered  Insulin needles and syringes with glucometer handed to pt. Pt denies needs at this time. All needs met at this time.

## 2020-12-28 ENCOUNTER — DOCUMENTATION ONLY (OUTPATIENT)
Dept: CASE MANAGEMENT | Age: 40
End: 2020-12-28

## 2020-12-28 NOTE — PROGRESS NOTES
Wellness Outreach/HCI team accepted referral from [address] [place] [address]. HELENA BoldenN, [place] [id number], [facility] .

## 2021-03-30 NOTE — ED TRIAGE NOTES
Pt to triage via w/c. [employer]  utilized #[id number]. Pt reports lower back pain, lower abd pain, and right flank pain. Pt states it started about 1000 this morning. Pt LMP 11/17/2019. Pt reports vaginal pain and clear and white vaginal d/c with odor. Pt denies hx of kidney stones. Pt reports vomiting, but denies diarrhea. Pt denies any urinary s/sx. Pt is not sure if she is pregnant or not but states she is having pressure in her anus and in her vagina and cramping she compares to contractions. Pt states last time saw a doctor was 3 months ago. Pt is not sure when she last took a pregnancy test. [facility] but [facility] were taking too long so she left and came here. You were seen and evaluated today for fever and body aches likely caused by your recent vaccination. These symptoms are common and expected with the COVID-19 vaccine.     Please follow up with your regular physician within 1-2 weeks.    Continue taking advil and tylenol as needed for pain at home. Take as instructed on the bottle.     Please return to the emergency department if you experience severe pain consistent with pain crisis, fever persistent for 4 or more days, shortness of breath, or any new concerning symptom

## 2021-09-23 ENCOUNTER — APPOINTMENT (OUTPATIENT)
Dept: GENERAL RADIOLOGY | Age: 41
End: 2021-09-23

## 2021-09-23 ENCOUNTER — HOSPITAL ENCOUNTER (EMERGENCY)
Age: 41
Discharge: HOME | End: 2021-09-24
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
## 2021-09-23 DIAGNOSIS — S16.1XXA STRAIN OF NECK MUSCLE, INITIAL ENCOUNTER: ICD-10-CM

## 2021-09-23 PROCEDURE — 71045 X-RAY EXAM CHEST 1 VIEW: CPT

## 2021-09-23 PROCEDURE — 72040 X-RAY EXAM NECK SPINE 2-3 VW: CPT

## 2021-09-23 PROCEDURE — 99284 EMERGENCY DEPT VISIT MOD MDM: CPT

## 2021-09-24 VITALS
SYSTOLIC BLOOD PRESSURE: 145 MMHG | TEMPERATURE: 97.9 F | WEIGHT: 187 LBS | HEART RATE: 82 BPM | BODY MASS INDEX: 30.05 KG/M2 | RESPIRATION RATE: 18 BRPM | OXYGEN SATURATION: 99 % | DIASTOLIC BLOOD PRESSURE: 89 MMHG | HEIGHT: 66 IN

## 2021-09-24 RX ORDER — IBUPROFEN 600 MG/1
600 TABLET ORAL
Qty: 20 TABLET | Refills: 0 | Status: SHIPPED | OUTPATIENT
Start: 2021-09-24

## 2021-09-24 RX ORDER — CYCLOBENZAPRINE HCL 10 MG
10 TABLET ORAL
Qty: 12 TABLET | Refills: 0 | Status: SHIPPED | OUTPATIENT
Start: 2021-09-24

## 2021-09-24 NOTE — ED NOTES
I have reviewed discharge instructions with the patient. The patient verbalized understanding. Patient left ED via Discharge Method: ambulatory to Home with family. Opportunity for questions and clarification provided. Patient given 2 scripts. To continue your aftercare when you leave the hospital, you may receive an automated call from our care team to check in on how you are doing. This is a free service and part of our promise to provide the best care and service to meet your aftercare needs.  If you have questions, or wish to unsubscribe from this service please call [phone]. Thank you for Choosing our [facility] Emergency Department.
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
## 2024-02-04 NOTE — PROGRESS NOTES
Problem: Self Care Deficits Care Plan (Adult)  Goal: *Acute Goals and Plan of Care (Insert Text)  Description  1. Pt will toilet with SBA   2. Pt will complete functional mobility for ADLs with SBA  3. Pt will complete lower body dressing with SBA using AE as needed  4. Pt will complete grooming and hygiene at sink with SBA  5. Pt will demonstrate independence with HEP to promote increased BUE strength and functional use for ADLs  6. Pt will tolerate 23 minutes functional activity with min or fewer rest breaks to promote increased endurance for ADLs    Timeframe: 7 days     Outcome: Progressing Towards Goal     OCCUPATIONAL THERAPY: Initial Assessment and Daily Note 11/27/2019  INPATIENT: OT Visit Days: 1  Payor: MEDICAID PENDING / Plan: Chantel Ovalle PENDING / Product Type: Medicaid /      NAME/AGE/GENDER: Cathie Mclaughlin is a 43 y.o. female   PRIMARY DIAGNOSIS:  Ureteral stone [N20.1] Septic shock ([address] Septic shock ([address]  Procedure(s) (LRB):  CYSTOSCOPY URETERAL STENT INSERTION (Right)  3 Days Post-Op  ICD-10: Treatment Diagnosis:    Generalized Muscle Weakness (M62.81)   Precautions/Allergies:    falls Patient has no known allergies. ASSESSMENT:     Ms. Lazarus Pelaez was admitted with septic shock d/t the above.  Anna Aaron assisted d/t language barrier. Pt lives alone and is independent at baseline, drives, works as a , does not own or use any DME. This session, pt presented generally weak with deficits in endurance, mobility, and strength impacting ADLs. Pt demonstrated bed mobility with SBA, required min A w/ HHA for mobility in room and bathroom, sink and toilet transfers. Pt donned socks with CGA, toileted with CGA, washed hands at sink with CGA. Pt fatigued very quickly with mobility in hallway, stated that she felt very weak and was able to tolerate only a short distance and small amount of activity, also endorsed increased pain with activity.  Pt is below her functional baseline and would benefit from skilled OT services to address deficits. This section established at most recent assessment   PROBLEM LIST (Impairments causing functional limitations):  Decreased Strength  Decreased ADL/Functional Activities  Decreased Transfer Abilities  Decreased Balance  Increased Pain  Decreased Activity Tolerance  Increased Fatigue   INTERVENTIONS PLANNED: (Benefits and precautions of occupational therapy have been discussed with the patient.)  Activities of daily living training  Adaptive equipment training  Balance training  Therapeutic activity  Therapeutic exercise     TREATMENT PLAN: Frequency/Duration: Follow patient 3 times/ week to address above goals. Rehabilitation Potential For Stated Goals: Good     REHAB RECOMMENDATIONS (at time of discharge pending progress):    Placement: It is my opinion, based on this patient's performance to date, that Ms. Lazarus Pelaez may benefit from participating in 1-2 additional therapy sessions in order to continue to assess for rehab potential and then make recommendation for disposition at discharge. Equipment:   3:1 BS               OCCUPATIONAL PROFILE AND HISTORY:   History of Present Injury/Illness (Reason for Referral):  See H&P  Past Medical History/Comorbidities:   Ms. Lazarus Pelaez  has a past medical history of Diabetes ([facility] [address] Ms. Lazarus Pelaez  has a past surgical history that includes hx dilation and curettage.   Social History/Living Environment:   Home Environment: Private residence  # Steps to Enter: 3  One/Two Story Residence: One story  Living Alone: Yes  Support Systems: Family member(s)  Patient Expects to be Discharged to[de-identified] Private residence  Current DME Used/Available at Home: None  Tub or Shower Type: Shower  Prior Level of Function/Work/Activity:  Independent     Number of Personal Factors/Comorbidities that affect the Plan of Care: Expanded review of therapy/medical records (1-2):  MODERATE COMPLEXITY   ASSESSMENT OF OCCUPATIONAL PERFORMANCE[de-identified]   Activities of Daily Living:   Basic ADLs (From Assessment) Complex ADLs (From Assessment)   Feeding: Independent  Oral Facial Hygiene/Grooming: Contact guard assistance  Bathing: Minimum assistance  Upper Body Dressing: Setup  Lower Body Dressing: Contact guard assistance  Toileting: Contact guard assistance Instrumental ADL  Meal Preparation: Moderate assistance  Homemaking: Maximum assistance   Grooming/Bathing/Dressing Activities of Daily Living   Grooming  Grooming Assistance: Contact guard assistance             Toileting  Toileting Assistance: Contact guard assistance     Functional Transfers  Bathroom Mobility: Minimum assistance   Lower Body Dressing Assistance  Socks: Contact guard assistance Bed/Mat Mobility  Rolling: Stand-by assistance  Supine to Sit: Stand-by assistance  Sit to Stand: Contact guard assistance  Stand to Sit: Stand-by assistance  Bed to Chair: Minimum assistance  Scooting: Stand-by assistance     Most Recent Physical Functioning:   Gross Assessment:  AROM: Within functional limits  Strength: Generally decreased, functional  Coordination: Within functional limits               Posture:  Posture (WDL): Exceptions to WDL  Posture Assessment: Forward head, Rounded shoulders  Balance:  Sitting: Intact  Standing: Impaired  Standing - Static: Fair  Standing - Dynamic : Fair Bed Mobility:  Rolling: Stand-by assistance  Supine to Sit: Stand-by assistance  Scooting: Stand-by assistance  Wheelchair Mobility:     Transfers:  Sit to Stand: Contact guard assistance  Stand to Sit: Stand-by assistance  Bed to Chair: Minimum assistance  Interventions: Verbal cues; Safety awareness training; Tactile cues  Duration: 8 Minutes            Patient Vitals for the past 6 hrs:   BP BP Patient Position SpO2 O2 Flow Rate (L/min) Pulse   11/27/19 1141 -- -- 95 % 2 l/min --   11/27/19 1200 115/73 Sitting 96 % 2 l/min 87       Mental Status  Neurologic State: Alert  Orientation Level: Oriented X4  Cognition: Appropriate decision making, Follows commands                          Physical Skills Involved:  Balance  Strength  Activity Tolerance  Pain (acute) Cognitive Skills Affected (resulting in the inability to perform in a timely and safe manner):  none  Psychosocial Skills Affected:  Habits/Routines  Environmental Adaptation   Number of elements that affect the Plan of Care: 3-5:  MODERATE COMPLEXITY   CLINICAL DECISION MAKIN[address] [place] [address] AM-PAC 6 Clicks   Daily Activity Inpatient Short Form  How much help from another person does the patient currently need. .. Total A Lot A Little None   1. Putting on and taking off regular lower body clothing? [] 1   [] 2   [x] 3   [] 4   2. Bathing (including washing, rinsing, drying)? [] 1   [] 2   [x] 3   [] 4   3. Toileting, which includes using toilet, bedpan or urinal?   [] 1   [] 2   [x] 3   [] 4   4. Putting on and taking off regular upper body clothing? [] 1   [] 2   [x] 3   [] 4   5. Taking care of personal grooming such as brushing teeth? [] 1   [] 2   [x] 3   [] 4   6. Eating meals? [] 1   [] 2   [] 3   [x] 4   © , Trustees of [address] [place] [address], under license to [employer]. All rights reserved      Score:  Initial: 19 Most Recent: X (Date: -- )    Interpretation of Tool:  Represents activities that are increasingly more difficult (i.e. Bed mobility, Transfers, Gait). Medical Necessity:     Patient demonstrates   good   rehab potential due to higher previous functional level. Reason for Services/Other Comments:  Patient   continues to require present interventions due to patient's inability to independently complete ADLs   .    Use of outcome tool(s) and clinical judgement create a POC that gives a: MODERATE COMPLEXITY         TREATMENT:   (In addition to Assessment/Re-Assessment sessions the following treatments were rendered)     Pre-treatment Symptoms/Complaints:    Pain: Initial: Pain Intensity 1: 3  Pain Location 1: Abdomen, Back  Pain Intervention(s) 1: (pre-medicated)  Post Session:  5     Self Care: (10 min): Procedure(s) (per grid) utilized to improve and/or restore self-care/home management as related to dressing, toileting, and grooming. Required no   cueing to facilitate activities of daily living skills and compensatory activities. Braces/Orthotics/Lines/Etc:   O2 Device: Nasal cannula  Treatment/Session Assessment:    Response to Treatment:  no adverse reaction   Interdisciplinary Collaboration:   Physical Therapist  Occupational Therapist  Registered Nurse  After treatment position/precautions:   Up in chair  Bed/Chair-wheels locked  Call light within reach  RN notified   Compliance with Program/Exercises: Compliant all of the time. Recommendations/Intent for next treatment session: \"Next visit will focus on advancements to more challenging activities and reduction in assistance provided\".   Total Treatment Duration:  OT Patient Time In/Time Out  Time In: 1114  Time Out: 1700 [facility] No

## 2024-08-15 NOTE — PROGRESS NOTES
Awake most of night, no distress, respirations regular. .  Tylenol x 1 for back pain. Hourly rounds completed. Resting in bed. Denies needs or pain at this time. Bed in low locked position. Call light and personal items within reach. Will continue to monitor and give report to oncoming day shift nurse. Pt with , requests to drive by private car to [facility] ED.  Pt verbalizes and her , Kang, that they are NOT allowed to go home, go get something to eat, or divert in any capacity besides going to straight to [facility] ED.  Dr Schulz has spoken with the patient and verbalized her concern by not going by ambulance. Pt states she wants to go by private car.

## (undated) DEVICE — GARMENT,MEDLINE,DVT,INT,CALF,MED, GEN2: Brand: MEDLINE

## (undated) DEVICE — TRAY PREP DRY W/ PREM GLV 2 APPL 6 SPNG 2 UNDPD 1 OVERWRAP

## (undated) DEVICE — SOLUTION IRRIG 3000ML H2O STRL BAG

## (undated) DEVICE — GOWN,REINFORCED,POLY,AURORA,XXLARGE,STR: Brand: MEDLINE

## (undated) DEVICE — SOLUTION IRRIG 1000ML H2O STRL BLT

## (undated) DEVICE — CATHETER F BLLN 5CC 16FR 2 W HYDRGEL COAT LESS TRAUM LUB

## (undated) DEVICE — GUIDEWIRE ENDOSCP L150CM DIA0.038IN TIP L3CM PTFE FLX STR

## (undated) DEVICE — CYSTO: Brand: MEDLINE INDUSTRIES, INC.

## (undated) DEVICE — CYSTO/BLADDER IRRIGATION SET, REGULATING CLAMP